# Patient Record
Sex: FEMALE | Race: WHITE | NOT HISPANIC OR LATINO | Employment: OTHER | URBAN - METROPOLITAN AREA
[De-identification: names, ages, dates, MRNs, and addresses within clinical notes are randomized per-mention and may not be internally consistent; named-entity substitution may affect disease eponyms.]

---

## 2018-01-27 ENCOUNTER — APPOINTMENT (OUTPATIENT)
Dept: GENERAL RADIOLOGY | Facility: HOSPITAL | Age: 68
End: 2018-01-27

## 2018-01-27 ENCOUNTER — APPOINTMENT (OUTPATIENT)
Dept: CT IMAGING | Facility: HOSPITAL | Age: 68
End: 2018-01-27

## 2018-01-27 ENCOUNTER — HOSPITAL ENCOUNTER (OUTPATIENT)
Facility: HOSPITAL | Age: 68
Setting detail: OBSERVATION
Discharge: NURSING, REHAB OR LONG-TERM CARE | End: 2018-02-09
Attending: EMERGENCY MEDICINE | Admitting: HOSPITALIST

## 2018-01-27 DIAGNOSIS — S42.351K CLOSED DISPLACED COMMINUTED FRACTURE OF SHAFT OF RIGHT HUMERUS WITH NONUNION, SUBSEQUENT ENCOUNTER: ICD-10-CM

## 2018-01-27 DIAGNOSIS — R53.81 PHYSICAL DECONDITIONING: ICD-10-CM

## 2018-01-27 DIAGNOSIS — Z78.9 IMPAIRED MOBILITY AND ADLS: ICD-10-CM

## 2018-01-27 DIAGNOSIS — Z74.09 IMPAIRED FUNCTIONAL MOBILITY, BALANCE, GAIT, AND ENDURANCE: ICD-10-CM

## 2018-01-27 DIAGNOSIS — Z74.09 IMPAIRED MOBILITY AND ADLS: ICD-10-CM

## 2018-01-27 DIAGNOSIS — M43.6 TORTICOLLIS, ACUTE: Primary | ICD-10-CM

## 2018-01-27 PROBLEM — R25.1 TREMOR: Status: ACTIVE | Noted: 2018-01-27

## 2018-01-27 PROBLEM — E11.9 DIABETES MELLITUS, TYPE II (HCC): Chronic | Status: ACTIVE | Noted: 2018-01-27

## 2018-01-27 PROBLEM — E78.5 HLD (HYPERLIPIDEMIA): Chronic | Status: ACTIVE | Noted: 2018-01-27

## 2018-01-27 PROBLEM — F20.9 SCHIZOPHRENIA (HCC): Chronic | Status: ACTIVE | Noted: 2018-01-27

## 2018-01-27 PROBLEM — I25.10 CAD (CORONARY ARTERY DISEASE): Chronic | Status: ACTIVE | Noted: 2018-01-27

## 2018-01-27 PROBLEM — D64.9 ANEMIA: Status: ACTIVE | Noted: 2018-01-27

## 2018-01-27 PROBLEM — I10 HTN (HYPERTENSION): Chronic | Status: ACTIVE | Noted: 2018-01-27

## 2018-01-27 LAB
ALBUMIN SERPL-MCNC: 4.3 G/DL (ref 3.2–4.8)
ALBUMIN/GLOB SERPL: 1.5 G/DL (ref 1.5–2.5)
ALP SERPL-CCNC: 63 U/L (ref 25–100)
ALT SERPL W P-5'-P-CCNC: 54 U/L (ref 7–40)
ANION GAP SERPL CALCULATED.3IONS-SCNC: 8 MMOL/L (ref 3–11)
AST SERPL-CCNC: 52 U/L (ref 0–33)
BACTERIA UR QL AUTO: ABNORMAL /HPF
BASOPHILS # BLD AUTO: 0.04 10*3/MM3 (ref 0–0.2)
BASOPHILS NFR BLD AUTO: 0.4 % (ref 0–1)
BILIRUB SERPL-MCNC: 0.5 MG/DL (ref 0.3–1.2)
BILIRUB UR QL STRIP: NEGATIVE
BUN BLD-MCNC: 21 MG/DL (ref 9–23)
BUN/CREAT SERPL: 26.3 (ref 7–25)
CALCIUM SPEC-SCNC: 9.5 MG/DL (ref 8.7–10.4)
CHLORIDE SERPL-SCNC: 104 MMOL/L (ref 99–109)
CLARITY UR: CLEAR
CO2 SERPL-SCNC: 30 MMOL/L (ref 20–31)
COLOR UR: YELLOW
CREAT BLD-MCNC: 0.8 MG/DL (ref 0.6–1.3)
D-LACTATE SERPL-SCNC: 1.4 MMOL/L (ref 0.5–2)
DEPRECATED RDW RBC AUTO: 48.6 FL (ref 37–54)
EOSINOPHIL # BLD AUTO: 0.01 10*3/MM3 (ref 0–0.3)
EOSINOPHIL NFR BLD AUTO: 0.1 % (ref 0–3)
ERYTHROCYTE [DISTWIDTH] IN BLOOD BY AUTOMATED COUNT: 14.7 % (ref 11.3–14.5)
FLUAV AG NPH QL: NEGATIVE
FLUBV AG NPH QL IA: NEGATIVE
GFR SERPL CREATININE-BSD FRML MDRD: 71 ML/MIN/1.73
GLOBULIN UR ELPH-MCNC: 2.9 GM/DL
GLUCOSE BLD-MCNC: 113 MG/DL (ref 70–100)
GLUCOSE BLDC GLUCOMTR-MCNC: 116 MG/DL (ref 70–130)
GLUCOSE UR STRIP-MCNC: NEGATIVE MG/DL
HCT VFR BLD AUTO: 28.8 % (ref 34.5–44)
HGB BLD-MCNC: 9.4 G/DL (ref 11.5–15.5)
HGB UR QL STRIP.AUTO: NEGATIVE
HYALINE CASTS UR QL AUTO: ABNORMAL /LPF
IMM GRANULOCYTES # BLD: 0.05 10*3/MM3 (ref 0–0.03)
IMM GRANULOCYTES NFR BLD: 0.5 % (ref 0–0.6)
KETONES UR QL STRIP: NEGATIVE
LEUKOCYTE ESTERASE UR QL STRIP.AUTO: ABNORMAL
LYMPHOCYTES # BLD AUTO: 0.77 10*3/MM3 (ref 0.6–4.8)
LYMPHOCYTES NFR BLD AUTO: 7.4 % (ref 24–44)
MAGNESIUM SERPL-MCNC: 1.3 MG/DL (ref 1.3–2.7)
MCH RBC QN AUTO: 29.5 PG (ref 27–31)
MCHC RBC AUTO-ENTMCNC: 32.6 G/DL (ref 32–36)
MCV RBC AUTO: 90.3 FL (ref 80–99)
MONOCYTES # BLD AUTO: 0.76 10*3/MM3 (ref 0–1)
MONOCYTES NFR BLD AUTO: 7.3 % (ref 0–12)
NEUTROPHILS # BLD AUTO: 8.81 10*3/MM3 (ref 1.5–8.3)
NEUTROPHILS NFR BLD AUTO: 84.3 % (ref 41–71)
NITRITE UR QL STRIP: NEGATIVE
PH UR STRIP.AUTO: 6 [PH] (ref 5–8)
PLATELET # BLD AUTO: 416 10*3/MM3 (ref 150–450)
PMV BLD AUTO: 9.3 FL (ref 6–12)
POTASSIUM BLD-SCNC: 3.7 MMOL/L (ref 3.5–5.5)
PROT SERPL-MCNC: 7.2 G/DL (ref 5.7–8.2)
PROT UR QL STRIP: NEGATIVE
RBC # BLD AUTO: 3.19 10*6/MM3 (ref 3.89–5.14)
RBC # UR: ABNORMAL /HPF
REF LAB TEST METHOD: ABNORMAL
SODIUM BLD-SCNC: 142 MMOL/L (ref 132–146)
SP GR UR STRIP: 1.01 (ref 1–1.03)
SQUAMOUS #/AREA URNS HPF: ABNORMAL /HPF
UROBILINOGEN UR QL STRIP: ABNORMAL
WBC NRBC COR # BLD: 10.44 10*3/MM3 (ref 3.5–10.8)
WBC UR QL AUTO: ABNORMAL /HPF

## 2018-01-27 PROCEDURE — 85025 COMPLETE CBC W/AUTO DIFF WBC: CPT | Performed by: NURSE PRACTITIONER

## 2018-01-27 PROCEDURE — 96374 THER/PROPH/DIAG INJ IV PUSH: CPT

## 2018-01-27 PROCEDURE — 82962 GLUCOSE BLOOD TEST: CPT

## 2018-01-27 PROCEDURE — 93005 ELECTROCARDIOGRAM TRACING: CPT | Performed by: NURSE PRACTITIONER

## 2018-01-27 PROCEDURE — 71045 X-RAY EXAM CHEST 1 VIEW: CPT

## 2018-01-27 PROCEDURE — 25010000002 FENTANYL CITRATE (PF) 100 MCG/2ML SOLUTION: Performed by: EMERGENCY MEDICINE

## 2018-01-27 PROCEDURE — 96372 THER/PROPH/DIAG INJ SC/IM: CPT

## 2018-01-27 PROCEDURE — 81001 URINALYSIS AUTO W/SCOPE: CPT | Performed by: NURSE PRACTITIONER

## 2018-01-27 PROCEDURE — 83605 ASSAY OF LACTIC ACID: CPT | Performed by: NURSE PRACTITIONER

## 2018-01-27 PROCEDURE — 93010 ELECTROCARDIOGRAM REPORT: CPT | Performed by: INTERNAL MEDICINE

## 2018-01-27 PROCEDURE — 25010000002 HEPARIN (PORCINE) PER 1000 UNITS: Performed by: NURSE PRACTITIONER

## 2018-01-27 PROCEDURE — 87040 BLOOD CULTURE FOR BACTERIA: CPT | Performed by: NURSE PRACTITIONER

## 2018-01-27 PROCEDURE — G0378 HOSPITAL OBSERVATION PER HR: HCPCS

## 2018-01-27 PROCEDURE — 96361 HYDRATE IV INFUSION ADD-ON: CPT

## 2018-01-27 PROCEDURE — 99285 EMERGENCY DEPT VISIT HI MDM: CPT

## 2018-01-27 PROCEDURE — 87804 INFLUENZA ASSAY W/OPTIC: CPT | Performed by: NURSE PRACTITIONER

## 2018-01-27 PROCEDURE — 99220 PR INITIAL OBSERVATION CARE/DAY 70 MINUTES: CPT | Performed by: HOSPITALIST

## 2018-01-27 PROCEDURE — 70450 CT HEAD/BRAIN W/O DYE: CPT

## 2018-01-27 PROCEDURE — 36415 COLL VENOUS BLD VENIPUNCTURE: CPT

## 2018-01-27 PROCEDURE — P9612 CATHETERIZE FOR URINE SPEC: HCPCS

## 2018-01-27 PROCEDURE — 83735 ASSAY OF MAGNESIUM: CPT | Performed by: NURSE PRACTITIONER

## 2018-01-27 PROCEDURE — 80053 COMPREHEN METABOLIC PANEL: CPT | Performed by: NURSE PRACTITIONER

## 2018-01-27 RX ORDER — FLUOXETINE 10 MG/1
10 CAPSULE ORAL DAILY
Status: DISCONTINUED | OUTPATIENT
Start: 2018-01-28 | End: 2018-02-09 | Stop reason: HOSPADM

## 2018-01-27 RX ORDER — FENTANYL CITRATE 50 UG/ML
25 INJECTION, SOLUTION INTRAMUSCULAR; INTRAVENOUS ONCE
Status: COMPLETED | OUTPATIENT
Start: 2018-01-27 | End: 2018-01-27

## 2018-01-27 RX ORDER — ONDANSETRON 2 MG/ML
4 INJECTION INTRAMUSCULAR; INTRAVENOUS EVERY 6 HOURS PRN
Status: DISCONTINUED | OUTPATIENT
Start: 2018-01-27 | End: 2018-02-09 | Stop reason: HOSPADM

## 2018-01-27 RX ORDER — BENZTROPINE MESYLATE 1 MG/1
0.5 TABLET ORAL EVERY EVENING
Status: DISCONTINUED | OUTPATIENT
Start: 2018-01-27 | End: 2018-02-09 | Stop reason: HOSPADM

## 2018-01-27 RX ORDER — BUSPIRONE HYDROCHLORIDE 10 MG/1
5 TABLET ORAL 2 TIMES DAILY
Status: DISCONTINUED | OUTPATIENT
Start: 2018-01-27 | End: 2018-02-09 | Stop reason: HOSPADM

## 2018-01-27 RX ORDER — GABAPENTIN 400 MG/1
400 CAPSULE ORAL 2 TIMES DAILY
COMMUNITY
End: 2018-02-09 | Stop reason: HOSPADM

## 2018-01-27 RX ORDER — TRAMADOL HYDROCHLORIDE 50 MG/1
50 TABLET ORAL 2 TIMES DAILY PRN
Status: DISCONTINUED | OUTPATIENT
Start: 2018-01-27 | End: 2018-02-09 | Stop reason: HOSPADM

## 2018-01-27 RX ORDER — AMLODIPINE BESYLATE 10 MG/1
10 TABLET ORAL DAILY
COMMUNITY

## 2018-01-27 RX ORDER — FLUTICASONE PROPIONATE 50 MCG
2 SPRAY, SUSPENSION (ML) NASAL DAILY
Status: DISCONTINUED | OUTPATIENT
Start: 2018-01-28 | End: 2018-02-09 | Stop reason: HOSPADM

## 2018-01-27 RX ORDER — PERPHENAZINE 4 MG/1
4 TABLET ORAL DAILY
COMMUNITY

## 2018-01-27 RX ORDER — PANTOPRAZOLE SODIUM 40 MG/1
40 TABLET, DELAYED RELEASE ORAL
Status: DISCONTINUED | OUTPATIENT
Start: 2018-01-28 | End: 2018-02-09 | Stop reason: HOSPADM

## 2018-01-27 RX ORDER — ATORVASTATIN CALCIUM 40 MG/1
40 TABLET, FILM COATED ORAL DAILY
COMMUNITY

## 2018-01-27 RX ORDER — DEXTROSE MONOHYDRATE 25 G/50ML
25 INJECTION, SOLUTION INTRAVENOUS
Status: DISCONTINUED | OUTPATIENT
Start: 2018-01-27 | End: 2018-02-05

## 2018-01-27 RX ORDER — ONDANSETRON 4 MG/1
4 TABLET, FILM COATED ORAL EVERY 6 HOURS PRN
Status: DISCONTINUED | OUTPATIENT
Start: 2018-01-27 | End: 2018-02-09 | Stop reason: HOSPADM

## 2018-01-27 RX ORDER — AMLODIPINE BESYLATE 10 MG/1
10 TABLET ORAL DAILY
Status: DISCONTINUED | OUTPATIENT
Start: 2018-01-28 | End: 2018-02-09 | Stop reason: HOSPADM

## 2018-01-27 RX ORDER — SODIUM CHLORIDE 0.9 % (FLUSH) 0.9 %
10 SYRINGE (ML) INJECTION AS NEEDED
Status: DISCONTINUED | OUTPATIENT
Start: 2018-01-27 | End: 2018-02-09 | Stop reason: HOSPADM

## 2018-01-27 RX ORDER — OLANZAPINE 5 MG/1
10 TABLET ORAL NIGHTLY
Status: DISCONTINUED | OUTPATIENT
Start: 2018-01-27 | End: 2018-02-09 | Stop reason: HOSPADM

## 2018-01-27 RX ORDER — BENZTROPINE MESYLATE 0.5 MG/1
0.5 TABLET ORAL EVERY EVENING
COMMUNITY

## 2018-01-27 RX ORDER — TRAMADOL HYDROCHLORIDE 50 MG/1
50 TABLET ORAL 2 TIMES DAILY
COMMUNITY
End: 2018-02-09 | Stop reason: HOSPADM

## 2018-01-27 RX ORDER — POLYETHYLENE GLYCOL 3350 17 G/17G
17 POWDER, FOR SOLUTION ORAL DAILY PRN
COMMUNITY

## 2018-01-27 RX ORDER — FLUTICASONE PROPIONATE 50 MCG
2 SPRAY, SUSPENSION (ML) NASAL DAILY
COMMUNITY

## 2018-01-27 RX ORDER — NICOTINE POLACRILEX 4 MG
15 LOZENGE BUCCAL
Status: DISCONTINUED | OUTPATIENT
Start: 2018-01-27 | End: 2018-02-05

## 2018-01-27 RX ORDER — HYDROCHLOROTHIAZIDE 12.5 MG/1
12.5 CAPSULE, GELATIN COATED ORAL DAILY
COMMUNITY
End: 2018-02-09 | Stop reason: HOSPADM

## 2018-01-27 RX ORDER — CLONAZEPAM 0.5 MG/1
0.5 TABLET ORAL 2 TIMES DAILY
COMMUNITY
End: 2018-02-09 | Stop reason: HOSPADM

## 2018-01-27 RX ORDER — ATORVASTATIN CALCIUM 40 MG/1
40 TABLET, FILM COATED ORAL NIGHTLY
Status: DISCONTINUED | OUTPATIENT
Start: 2018-01-27 | End: 2018-02-09 | Stop reason: HOSPADM

## 2018-01-27 RX ORDER — LOPERAMIDE HYDROCHLORIDE 2 MG/1
2 CAPSULE ORAL 4 TIMES DAILY PRN
COMMUNITY
End: 2018-02-09 | Stop reason: HOSPADM

## 2018-01-27 RX ORDER — CLOPIDOGREL BISULFATE 75 MG/1
75 TABLET ORAL DAILY
Status: DISCONTINUED | OUTPATIENT
Start: 2018-01-28 | End: 2018-02-09 | Stop reason: HOSPADM

## 2018-01-27 RX ORDER — SODIUM CHLORIDE 0.9 % (FLUSH) 0.9 %
1-10 SYRINGE (ML) INJECTION AS NEEDED
Status: DISCONTINUED | OUTPATIENT
Start: 2018-01-27 | End: 2018-02-09 | Stop reason: HOSPADM

## 2018-01-27 RX ORDER — ACETAMINOPHEN 325 MG/1
650 TABLET ORAL EVERY 4 HOURS PRN
Status: DISCONTINUED | OUTPATIENT
Start: 2018-01-27 | End: 2018-02-09 | Stop reason: HOSPADM

## 2018-01-27 RX ORDER — OMEPRAZOLE 20 MG/1
20 CAPSULE, DELAYED RELEASE ORAL DAILY
COMMUNITY

## 2018-01-27 RX ORDER — BUSPIRONE HYDROCHLORIDE 5 MG/1
5 TABLET ORAL 2 TIMES DAILY
COMMUNITY

## 2018-01-27 RX ORDER — PERPHENAZINE 2 MG/1
4 TABLET ORAL DAILY
Status: DISCONTINUED | OUTPATIENT
Start: 2018-01-28 | End: 2018-02-09 | Stop reason: HOSPADM

## 2018-01-27 RX ORDER — CLONAZEPAM 0.5 MG/1
0.5 TABLET ORAL 2 TIMES DAILY PRN
Status: DISCONTINUED | OUTPATIENT
Start: 2018-01-27 | End: 2018-02-09 | Stop reason: HOSPADM

## 2018-01-27 RX ORDER — TRAZODONE HYDROCHLORIDE 50 MG/1
50 TABLET ORAL NIGHTLY
COMMUNITY

## 2018-01-27 RX ORDER — ASPIRIN 81 MG/1
81 TABLET ORAL DAILY
COMMUNITY

## 2018-01-27 RX ORDER — FLUOXETINE 10 MG/1
10 CAPSULE ORAL DAILY
COMMUNITY

## 2018-01-27 RX ORDER — BISACODYL 5 MG/1
5 TABLET, DELAYED RELEASE ORAL DAILY PRN
Status: DISCONTINUED | OUTPATIENT
Start: 2018-01-27 | End: 2018-02-09 | Stop reason: HOSPADM

## 2018-01-27 RX ORDER — ASPIRIN 81 MG/1
81 TABLET ORAL DAILY
Status: DISCONTINUED | OUTPATIENT
Start: 2018-01-28 | End: 2018-02-09 | Stop reason: HOSPADM

## 2018-01-27 RX ORDER — MAGNESIUM HYDROXIDE/ALUMINUM HYDROXICE/SIMETHICONE 120; 1200; 1200 MG/30ML; MG/30ML; MG/30ML
SUSPENSION ORAL AS NEEDED
Status: ON HOLD | COMMUNITY
End: 2018-02-09

## 2018-01-27 RX ORDER — HYDROCODONE BITARTRATE AND ACETAMINOPHEN 7.5; 325 MG/1; MG/1
1 TABLET ORAL EVERY 4 HOURS PRN
COMMUNITY
End: 2018-02-09 | Stop reason: HOSPADM

## 2018-01-27 RX ORDER — CLOPIDOGREL BISULFATE 75 MG/1
75 TABLET ORAL DAILY
COMMUNITY

## 2018-01-27 RX ORDER — LORATADINE 10 MG/1
10 TABLET ORAL DAILY
COMMUNITY

## 2018-01-27 RX ORDER — HYDROCODONE BITARTRATE AND ACETAMINOPHEN 7.5; 325 MG/1; MG/1
1 TABLET ORAL ONCE
Status: COMPLETED | OUTPATIENT
Start: 2018-01-27 | End: 2018-01-27

## 2018-01-27 RX ORDER — OLANZAPINE 10 MG/1
10 TABLET ORAL NIGHTLY
COMMUNITY

## 2018-01-27 RX ORDER — TRAZODONE HYDROCHLORIDE 50 MG/1
50 TABLET ORAL NIGHTLY
Status: DISCONTINUED | OUTPATIENT
Start: 2018-01-27 | End: 2018-02-09 | Stop reason: HOSPADM

## 2018-01-27 RX ORDER — HYDROXYZINE HYDROCHLORIDE 25 MG/1
25 TABLET, FILM COATED ORAL 2 TIMES DAILY PRN
COMMUNITY
End: 2018-02-09 | Stop reason: HOSPADM

## 2018-01-27 RX ORDER — HYDROCODONE BITARTRATE AND ACETAMINOPHEN 7.5; 325 MG/1; MG/1
1 TABLET ORAL EVERY 4 HOURS PRN
Status: DISCONTINUED | OUTPATIENT
Start: 2018-01-27 | End: 2018-02-09 | Stop reason: HOSPADM

## 2018-01-27 RX ORDER — FAMOTIDINE 20 MG/1
20 TABLET, FILM COATED ORAL 2 TIMES DAILY PRN
Status: DISCONTINUED | OUTPATIENT
Start: 2018-01-27 | End: 2018-02-09 | Stop reason: HOSPADM

## 2018-01-27 RX ORDER — IBUPROFEN 600 MG/1
600 TABLET ORAL EVERY 6 HOURS PRN
COMMUNITY

## 2018-01-27 RX ORDER — DOCUSATE SODIUM 100 MG/1
100 CAPSULE, LIQUID FILLED ORAL 2 TIMES DAILY PRN
Status: DISCONTINUED | OUTPATIENT
Start: 2018-01-27 | End: 2018-02-09 | Stop reason: HOSPADM

## 2018-01-27 RX ORDER — ACETAMINOPHEN 325 MG/1
650 TABLET ORAL EVERY 6 HOURS PRN
COMMUNITY

## 2018-01-27 RX ORDER — GABAPENTIN 400 MG/1
400 CAPSULE ORAL 2 TIMES DAILY
Status: DISCONTINUED | OUTPATIENT
Start: 2018-01-27 | End: 2018-02-09 | Stop reason: HOSPADM

## 2018-01-27 RX ORDER — HEPARIN SODIUM 5000 [USP'U]/ML
5000 INJECTION, SOLUTION INTRAVENOUS; SUBCUTANEOUS EVERY 12 HOURS SCHEDULED
Status: DISCONTINUED | OUTPATIENT
Start: 2018-01-27 | End: 2018-02-09 | Stop reason: HOSPADM

## 2018-01-27 RX ORDER — CETIRIZINE HYDROCHLORIDE 10 MG/1
10 TABLET ORAL NIGHTLY
Status: DISCONTINUED | OUTPATIENT
Start: 2018-01-27 | End: 2018-02-09 | Stop reason: HOSPADM

## 2018-01-27 RX ADMIN — HYDROCODONE BITARTRATE AND ACETAMINOPHEN 1 TABLET: 7.5; 325 TABLET ORAL at 22:07

## 2018-01-27 RX ADMIN — CETIRIZINE HYDROCHLORIDE 10 MG: 10 TABLET, FILM COATED ORAL at 22:07

## 2018-01-27 RX ADMIN — HYDROCODONE BITARTRATE AND ACETAMINOPHEN 1 TABLET: 7.5; 325 TABLET ORAL at 17:52

## 2018-01-27 RX ADMIN — TRAZODONE HYDROCHLORIDE 50 MG: 50 TABLET ORAL at 22:07

## 2018-01-27 RX ADMIN — ATORVASTATIN CALCIUM 40 MG: 40 TABLET, FILM COATED ORAL at 22:07

## 2018-01-27 RX ADMIN — BUSPIRONE HYDROCHLORIDE 5 MG: 10 TABLET ORAL at 22:07

## 2018-01-27 RX ADMIN — FENTANYL CITRATE 25 MCG: 50 INJECTION, SOLUTION INTRAMUSCULAR; INTRAVENOUS at 15:21

## 2018-01-27 RX ADMIN — HEPARIN SODIUM 5000 UNITS: 5000 INJECTION, SOLUTION INTRAVENOUS; SUBCUTANEOUS at 22:07

## 2018-01-27 RX ADMIN — SODIUM CHLORIDE 1000 ML: 9 INJECTION, SOLUTION INTRAVENOUS at 13:03

## 2018-01-27 RX ADMIN — OLANZAPINE 10 MG: 5 TABLET, FILM COATED ORAL at 22:42

## 2018-01-27 RX ADMIN — BENZTROPINE MESYLATE 0.5 MG: 1 TABLET ORAL at 22:42

## 2018-01-27 RX ADMIN — GABAPENTIN 400 MG: 400 CAPSULE ORAL at 22:07

## 2018-01-28 ENCOUNTER — APPOINTMENT (OUTPATIENT)
Dept: GENERAL RADIOLOGY | Facility: HOSPITAL | Age: 68
End: 2018-01-28

## 2018-01-28 LAB
AMPHET+METHAMPHET UR QL: NEGATIVE
AMPHETAMINES UR QL: NEGATIVE
ANION GAP SERPL CALCULATED.3IONS-SCNC: 10 MMOL/L (ref 3–11)
BARBITURATES UR QL SCN: NEGATIVE
BENZODIAZ UR QL SCN: NEGATIVE
BUN BLD-MCNC: 17 MG/DL (ref 9–23)
BUN/CREAT SERPL: 24.3 (ref 7–25)
BUPRENORPHINE SERPL-MCNC: NEGATIVE NG/ML
CALCIUM SPEC-SCNC: 8.8 MG/DL (ref 8.7–10.4)
CANNABINOIDS SERPL QL: NEGATIVE
CHLORIDE SERPL-SCNC: 101 MMOL/L (ref 99–109)
CO2 SERPL-SCNC: 30 MMOL/L (ref 20–31)
COCAINE UR QL: NEGATIVE
CREAT BLD-MCNC: 0.7 MG/DL (ref 0.6–1.3)
DEPRECATED RDW RBC AUTO: 50.2 FL (ref 37–54)
ERYTHROCYTE [DISTWIDTH] IN BLOOD BY AUTOMATED COUNT: 14.9 % (ref 11.3–14.5)
FERRITIN SERPL-MCNC: 42 NG/ML (ref 10–291)
FLUAV SUBTYP SPEC NAA+PROBE: NOT DETECTED
FLUBV RNA ISLT QL NAA+PROBE: NOT DETECTED
FOLATE SERPL-MCNC: 14.79 NG/ML (ref 3.2–20)
GFR SERPL CREATININE-BSD FRML MDRD: 83 ML/MIN/1.73
GLUCOSE BLD-MCNC: 112 MG/DL (ref 70–100)
GLUCOSE BLDC GLUCOMTR-MCNC: 111 MG/DL (ref 70–130)
GLUCOSE BLDC GLUCOMTR-MCNC: 117 MG/DL (ref 70–130)
GLUCOSE BLDC GLUCOMTR-MCNC: 118 MG/DL (ref 70–130)
GLUCOSE BLDC GLUCOMTR-MCNC: 141 MG/DL (ref 70–130)
HCT VFR BLD AUTO: 28 % (ref 34.5–44)
HGB BLD-MCNC: 8.7 G/DL (ref 11.5–15.5)
IRON 24H UR-MRATE: 29 MCG/DL (ref 50–175)
IRON SATN MFR SERPL: 10 % (ref 15–50)
MCH RBC QN AUTO: 28.5 PG (ref 27–31)
MCHC RBC AUTO-ENTMCNC: 31.1 G/DL (ref 32–36)
MCV RBC AUTO: 91.8 FL (ref 80–99)
METHADONE UR QL SCN: NEGATIVE
OPIATES UR QL: POSITIVE
OXYCODONE UR QL SCN: NEGATIVE
PCP UR QL SCN: NEGATIVE
PLATELET # BLD AUTO: 382 10*3/MM3 (ref 150–450)
PMV BLD AUTO: 9.6 FL (ref 6–12)
POTASSIUM BLD-SCNC: 3.7 MMOL/L (ref 3.5–5.5)
PROPOXYPH UR QL: NEGATIVE
RBC # BLD AUTO: 3.05 10*6/MM3 (ref 3.89–5.14)
RETICS/RBC NFR AUTO: 2.51 % (ref 0.5–1.5)
SODIUM BLD-SCNC: 141 MMOL/L (ref 132–146)
TIBC SERPL-MCNC: 301 MCG/DL (ref 250–450)
TRICYCLICS UR QL SCN: NEGATIVE
VIT B12 BLD-MCNC: 291 PG/ML (ref 211–911)
WBC NRBC COR # BLD: 7.47 10*3/MM3 (ref 3.5–10.8)

## 2018-01-28 PROCEDURE — 63710000001 INSULIN LISPRO (HUMAN) PER 5 UNITS: Performed by: NURSE PRACTITIONER

## 2018-01-28 PROCEDURE — 85027 COMPLETE CBC AUTOMATED: CPT | Performed by: NURSE PRACTITIONER

## 2018-01-28 PROCEDURE — 80048 BASIC METABOLIC PNL TOTAL CA: CPT | Performed by: NURSE PRACTITIONER

## 2018-01-28 PROCEDURE — 99226 PR SBSQ OBSERVATION CARE/DAY 35 MINUTES: CPT | Performed by: INTERNAL MEDICINE

## 2018-01-28 PROCEDURE — G0378 HOSPITAL OBSERVATION PER HR: HCPCS

## 2018-01-28 PROCEDURE — 97162 PT EVAL MOD COMPLEX 30 MIN: CPT

## 2018-01-28 PROCEDURE — 83550 IRON BINDING TEST: CPT | Performed by: NURSE PRACTITIONER

## 2018-01-28 PROCEDURE — 97116 GAIT TRAINING THERAPY: CPT

## 2018-01-28 PROCEDURE — 73060 X-RAY EXAM OF HUMERUS: CPT

## 2018-01-28 PROCEDURE — 82607 VITAMIN B-12: CPT | Performed by: NURSE PRACTITIONER

## 2018-01-28 PROCEDURE — 82962 GLUCOSE BLOOD TEST: CPT

## 2018-01-28 PROCEDURE — 82746 ASSAY OF FOLIC ACID SERUM: CPT | Performed by: NURSE PRACTITIONER

## 2018-01-28 PROCEDURE — 99204 OFFICE O/P NEW MOD 45 MIN: CPT | Performed by: ORTHOPAEDIC SURGERY

## 2018-01-28 PROCEDURE — 87502 INFLUENZA DNA AMP PROBE: CPT | Performed by: NURSE PRACTITIONER

## 2018-01-28 PROCEDURE — 94799 UNLISTED PULMONARY SVC/PX: CPT

## 2018-01-28 PROCEDURE — 25010000002 HEPARIN (PORCINE) PER 1000 UNITS: Performed by: NURSE PRACTITIONER

## 2018-01-28 PROCEDURE — 85045 AUTOMATED RETICULOCYTE COUNT: CPT | Performed by: NURSE PRACTITIONER

## 2018-01-28 PROCEDURE — 96372 THER/PROPH/DIAG INJ SC/IM: CPT

## 2018-01-28 PROCEDURE — 80306 DRUG TEST PRSMV INSTRMNT: CPT | Performed by: NURSE PRACTITIONER

## 2018-01-28 PROCEDURE — 83540 ASSAY OF IRON: CPT | Performed by: NURSE PRACTITIONER

## 2018-01-28 PROCEDURE — 82728 ASSAY OF FERRITIN: CPT | Performed by: NURSE PRACTITIONER

## 2018-01-28 RX ORDER — ASCORBIC ACID 500 MG
500 TABLET ORAL DAILY
Status: DISCONTINUED | OUTPATIENT
Start: 2018-01-28 | End: 2018-02-09 | Stop reason: HOSPADM

## 2018-01-28 RX ORDER — FERROUS SULFATE 325(65) MG
325 TABLET ORAL
Status: DISCONTINUED | OUTPATIENT
Start: 2018-01-28 | End: 2018-02-09 | Stop reason: HOSPADM

## 2018-01-28 RX ADMIN — HYDROCODONE BITARTRATE AND ACETAMINOPHEN 1 TABLET: 7.5; 325 TABLET ORAL at 20:15

## 2018-01-28 RX ADMIN — ASPIRIN 81 MG: 81 TABLET, COATED ORAL at 08:50

## 2018-01-28 RX ADMIN — CLONAZEPAM 0.5 MG: 0.5 TABLET ORAL at 20:15

## 2018-01-28 RX ADMIN — OLANZAPINE 10 MG: 5 TABLET, FILM COATED ORAL at 20:15

## 2018-01-28 RX ADMIN — TRAZODONE HYDROCHLORIDE 50 MG: 50 TABLET ORAL at 20:15

## 2018-01-28 RX ADMIN — BUSPIRONE HYDROCHLORIDE 5 MG: 10 TABLET ORAL at 20:15

## 2018-01-28 RX ADMIN — PERPHENAZINE 4 MG: 2 TABLET, FILM COATED ORAL at 08:50

## 2018-01-28 RX ADMIN — FLUOXETINE 10 MG: 10 CAPSULE ORAL at 08:50

## 2018-01-28 RX ADMIN — FLUTICASONE PROPIONATE 2 SPRAY: 50 SPRAY, METERED NASAL at 08:49

## 2018-01-28 RX ADMIN — BUSPIRONE HYDROCHLORIDE 5 MG: 10 TABLET ORAL at 08:50

## 2018-01-28 RX ADMIN — OXYCODONE HYDROCHLORIDE AND ACETAMINOPHEN 500 MG: 500 TABLET ORAL at 16:09

## 2018-01-28 RX ADMIN — CLOPIDOGREL BISULFATE 75 MG: 75 TABLET ORAL at 08:50

## 2018-01-28 RX ADMIN — CETIRIZINE HYDROCHLORIDE 10 MG: 10 TABLET, FILM COATED ORAL at 20:15

## 2018-01-28 RX ADMIN — BENZTROPINE MESYLATE 0.5 MG: 1 TABLET ORAL at 16:14

## 2018-01-28 RX ADMIN — GABAPENTIN 400 MG: 400 CAPSULE ORAL at 20:15

## 2018-01-28 RX ADMIN — ATORVASTATIN CALCIUM 40 MG: 40 TABLET, FILM COATED ORAL at 20:15

## 2018-01-28 RX ADMIN — HYDROCODONE BITARTRATE AND ACETAMINOPHEN 1 TABLET: 7.5; 325 TABLET ORAL at 16:09

## 2018-01-28 RX ADMIN — HEPARIN SODIUM 5000 UNITS: 5000 INJECTION, SOLUTION INTRAVENOUS; SUBCUTANEOUS at 20:14

## 2018-01-28 RX ADMIN — HYDROCODONE BITARTRATE AND ACETAMINOPHEN 1 TABLET: 7.5; 325 TABLET ORAL at 05:32

## 2018-01-28 RX ADMIN — Medication 325 MG: at 16:09

## 2018-01-28 RX ADMIN — TRAMADOL HYDROCHLORIDE 50 MG: 50 TABLET, COATED ORAL at 20:15

## 2018-01-28 RX ADMIN — PANTOPRAZOLE SODIUM 40 MG: 40 TABLET, DELAYED RELEASE ORAL at 05:31

## 2018-01-28 RX ADMIN — AMLODIPINE BESYLATE 10 MG: 10 TABLET ORAL at 08:50

## 2018-01-28 RX ADMIN — GABAPENTIN 400 MG: 400 CAPSULE ORAL at 08:50

## 2018-01-28 RX ADMIN — HYDROCODONE BITARTRATE AND ACETAMINOPHEN 1 TABLET: 7.5; 325 TABLET ORAL at 11:06

## 2018-01-28 RX ADMIN — HEPARIN SODIUM 5000 UNITS: 5000 INJECTION, SOLUTION INTRAVENOUS; SUBCUTANEOUS at 08:53

## 2018-01-29 ENCOUNTER — APPOINTMENT (OUTPATIENT)
Dept: GENERAL RADIOLOGY | Facility: HOSPITAL | Age: 68
End: 2018-01-29

## 2018-01-29 LAB
GLUCOSE BLDC GLUCOMTR-MCNC: 121 MG/DL (ref 70–130)
GLUCOSE BLDC GLUCOMTR-MCNC: 177 MG/DL (ref 70–130)
GLUCOSE BLDC GLUCOMTR-MCNC: 199 MG/DL (ref 70–130)
GLUCOSE BLDC GLUCOMTR-MCNC: 200 MG/DL (ref 70–130)

## 2018-01-29 PROCEDURE — 97116 GAIT TRAINING THERAPY: CPT

## 2018-01-29 PROCEDURE — G8987 SELF CARE CURRENT STATUS: HCPCS | Performed by: OCCUPATIONAL THERAPIST

## 2018-01-29 PROCEDURE — 73020 X-RAY EXAM OF SHOULDER: CPT

## 2018-01-29 PROCEDURE — G8988 SELF CARE GOAL STATUS: HCPCS | Performed by: OCCUPATIONAL THERAPIST

## 2018-01-29 PROCEDURE — G0378 HOSPITAL OBSERVATION PER HR: HCPCS

## 2018-01-29 PROCEDURE — 25010000002 HEPARIN (PORCINE) PER 1000 UNITS: Performed by: NURSE PRACTITIONER

## 2018-01-29 PROCEDURE — 97166 OT EVAL MOD COMPLEX 45 MIN: CPT | Performed by: OCCUPATIONAL THERAPIST

## 2018-01-29 PROCEDURE — 97530 THERAPEUTIC ACTIVITIES: CPT | Performed by: OCCUPATIONAL THERAPIST

## 2018-01-29 PROCEDURE — 99213 OFFICE O/P EST LOW 20 MIN: CPT | Performed by: ORTHOPAEDIC SURGERY

## 2018-01-29 PROCEDURE — 96372 THER/PROPH/DIAG INJ SC/IM: CPT

## 2018-01-29 PROCEDURE — 99225 PR SBSQ OBSERVATION CARE/DAY 25 MINUTES: CPT | Performed by: INTERNAL MEDICINE

## 2018-01-29 PROCEDURE — 82962 GLUCOSE BLOOD TEST: CPT

## 2018-01-29 RX ORDER — LIDOCAINE 50 MG/G
1 PATCH TOPICAL
Status: DISCONTINUED | OUTPATIENT
Start: 2018-01-29 | End: 2018-02-09 | Stop reason: HOSPADM

## 2018-01-29 RX ADMIN — INSULIN LISPRO 3 UNITS: 100 INJECTION, SOLUTION INTRAVENOUS; SUBCUTANEOUS at 17:57

## 2018-01-29 RX ADMIN — CLOPIDOGREL BISULFATE 75 MG: 75 TABLET ORAL at 09:28

## 2018-01-29 RX ADMIN — PERPHENAZINE 4 MG: 2 TABLET, FILM COATED ORAL at 09:28

## 2018-01-29 RX ADMIN — BUSPIRONE HYDROCHLORIDE 5 MG: 10 TABLET ORAL at 09:28

## 2018-01-29 RX ADMIN — INSULIN LISPRO 2 UNITS: 100 INJECTION, SOLUTION INTRAVENOUS; SUBCUTANEOUS at 11:42

## 2018-01-29 RX ADMIN — PANTOPRAZOLE SODIUM 40 MG: 40 TABLET, DELAYED RELEASE ORAL at 09:31

## 2018-01-29 RX ADMIN — ACETAMINOPHEN 650 MG: 325 TABLET, FILM COATED ORAL at 04:07

## 2018-01-29 RX ADMIN — BENZTROPINE MESYLATE 0.5 MG: 1 TABLET ORAL at 17:57

## 2018-01-29 RX ADMIN — LIDOCAINE 1 PATCH: 50 PATCH CUTANEOUS at 00:50

## 2018-01-29 RX ADMIN — INSULIN LISPRO 2 UNITS: 100 INJECTION, SOLUTION INTRAVENOUS; SUBCUTANEOUS at 21:30

## 2018-01-29 RX ADMIN — AMLODIPINE BESYLATE 10 MG: 10 TABLET ORAL at 09:28

## 2018-01-29 RX ADMIN — CLONAZEPAM 0.5 MG: 0.5 TABLET ORAL at 20:07

## 2018-01-29 RX ADMIN — FLUTICASONE PROPIONATE 2 SPRAY: 50 SPRAY, METERED NASAL at 09:32

## 2018-01-29 RX ADMIN — TRAZODONE HYDROCHLORIDE 50 MG: 50 TABLET ORAL at 20:03

## 2018-01-29 RX ADMIN — ACETAMINOPHEN 650 MG: 325 TABLET, FILM COATED ORAL at 00:02

## 2018-01-29 RX ADMIN — FLUOXETINE 10 MG: 10 CAPSULE ORAL at 09:28

## 2018-01-29 RX ADMIN — OLANZAPINE 10 MG: 5 TABLET, FILM COATED ORAL at 20:02

## 2018-01-29 RX ADMIN — HYDROCODONE BITARTRATE AND ACETAMINOPHEN 1 TABLET: 7.5; 325 TABLET ORAL at 04:07

## 2018-01-29 RX ADMIN — GABAPENTIN 400 MG: 400 CAPSULE ORAL at 09:28

## 2018-01-29 RX ADMIN — LIDOCAINE 1 PATCH: 50 PATCH CUTANEOUS at 09:27

## 2018-01-29 RX ADMIN — BUSPIRONE HYDROCHLORIDE 5 MG: 10 TABLET ORAL at 20:03

## 2018-01-29 RX ADMIN — ASPIRIN 81 MG: 81 TABLET, COATED ORAL at 09:28

## 2018-01-29 RX ADMIN — Medication 325 MG: at 09:28

## 2018-01-29 RX ADMIN — GABAPENTIN 400 MG: 400 CAPSULE ORAL at 20:03

## 2018-01-29 RX ADMIN — HYDROCODONE BITARTRATE AND ACETAMINOPHEN 1 TABLET: 7.5; 325 TABLET ORAL at 00:01

## 2018-01-29 RX ADMIN — HEPARIN SODIUM 5000 UNITS: 5000 INJECTION, SOLUTION INTRAVENOUS; SUBCUTANEOUS at 09:32

## 2018-01-29 RX ADMIN — ATORVASTATIN CALCIUM 40 MG: 40 TABLET, FILM COATED ORAL at 20:03

## 2018-01-29 RX ADMIN — CETIRIZINE HYDROCHLORIDE 10 MG: 10 TABLET, FILM COATED ORAL at 20:02

## 2018-01-29 RX ADMIN — HYDROCODONE BITARTRATE AND ACETAMINOPHEN 1 TABLET: 7.5; 325 TABLET ORAL at 09:29

## 2018-01-29 RX ADMIN — HEPARIN SODIUM 5000 UNITS: 5000 INJECTION, SOLUTION INTRAVENOUS; SUBCUTANEOUS at 20:03

## 2018-01-30 LAB
GLUCOSE BLDC GLUCOMTR-MCNC: 118 MG/DL (ref 70–130)
GLUCOSE BLDC GLUCOMTR-MCNC: 132 MG/DL (ref 70–130)
GLUCOSE BLDC GLUCOMTR-MCNC: 141 MG/DL (ref 70–130)
GLUCOSE BLDC GLUCOMTR-MCNC: 166 MG/DL (ref 70–130)

## 2018-01-30 PROCEDURE — 25010000002 HEPARIN (PORCINE) PER 1000 UNITS: Performed by: NURSE PRACTITIONER

## 2018-01-30 PROCEDURE — 99232 SBSQ HOSP IP/OBS MODERATE 35: CPT | Performed by: INTERNAL MEDICINE

## 2018-01-30 PROCEDURE — G8979 MOBILITY GOAL STATUS: HCPCS

## 2018-01-30 PROCEDURE — G8978 MOBILITY CURRENT STATUS: HCPCS

## 2018-01-30 PROCEDURE — 96372 THER/PROPH/DIAG INJ SC/IM: CPT

## 2018-01-30 PROCEDURE — 97110 THERAPEUTIC EXERCISES: CPT

## 2018-01-30 PROCEDURE — G0378 HOSPITAL OBSERVATION PER HR: HCPCS

## 2018-01-30 PROCEDURE — 99212 OFFICE O/P EST SF 10 MIN: CPT | Performed by: ORTHOPAEDIC SURGERY

## 2018-01-30 PROCEDURE — 82962 GLUCOSE BLOOD TEST: CPT

## 2018-01-30 RX ADMIN — HYDROCODONE BITARTRATE AND ACETAMINOPHEN 1 TABLET: 7.5; 325 TABLET ORAL at 16:52

## 2018-01-30 RX ADMIN — CETIRIZINE HYDROCHLORIDE 10 MG: 10 TABLET, FILM COATED ORAL at 20:56

## 2018-01-30 RX ADMIN — HYDROCODONE BITARTRATE AND ACETAMINOPHEN 1 TABLET: 7.5; 325 TABLET ORAL at 22:04

## 2018-01-30 RX ADMIN — Medication 325 MG: at 08:27

## 2018-01-30 RX ADMIN — PERPHENAZINE 4 MG: 2 TABLET, FILM COATED ORAL at 08:27

## 2018-01-30 RX ADMIN — HYDROCODONE BITARTRATE AND ACETAMINOPHEN 1 TABLET: 7.5; 325 TABLET ORAL at 06:27

## 2018-01-30 RX ADMIN — AMLODIPINE BESYLATE 10 MG: 10 TABLET ORAL at 08:28

## 2018-01-30 RX ADMIN — FLUOXETINE 10 MG: 10 CAPSULE ORAL at 08:28

## 2018-01-30 RX ADMIN — HYDROCODONE BITARTRATE AND ACETAMINOPHEN 1 TABLET: 7.5; 325 TABLET ORAL at 00:19

## 2018-01-30 RX ADMIN — HEPARIN SODIUM 5000 UNITS: 5000 INJECTION, SOLUTION INTRAVENOUS; SUBCUTANEOUS at 08:27

## 2018-01-30 RX ADMIN — GABAPENTIN 400 MG: 400 CAPSULE ORAL at 08:27

## 2018-01-30 RX ADMIN — GABAPENTIN 400 MG: 400 CAPSULE ORAL at 20:57

## 2018-01-30 RX ADMIN — TRAZODONE HYDROCHLORIDE 50 MG: 50 TABLET ORAL at 20:57

## 2018-01-30 RX ADMIN — CLONAZEPAM 0.5 MG: 0.5 TABLET ORAL at 16:52

## 2018-01-30 RX ADMIN — ATORVASTATIN CALCIUM 40 MG: 40 TABLET, FILM COATED ORAL at 20:57

## 2018-01-30 RX ADMIN — HYDROCODONE BITARTRATE AND ACETAMINOPHEN 1 TABLET: 7.5; 325 TABLET ORAL at 12:29

## 2018-01-30 RX ADMIN — LIDOCAINE 1 PATCH: 50 PATCH CUTANEOUS at 08:28

## 2018-01-30 RX ADMIN — CLOPIDOGREL BISULFATE 75 MG: 75 TABLET ORAL at 08:27

## 2018-01-30 RX ADMIN — BUSPIRONE HYDROCHLORIDE 5 MG: 10 TABLET ORAL at 20:57

## 2018-01-30 RX ADMIN — OLANZAPINE 10 MG: 5 TABLET, FILM COATED ORAL at 20:57

## 2018-01-30 RX ADMIN — INSULIN LISPRO 2 UNITS: 100 INJECTION, SOLUTION INTRAVENOUS; SUBCUTANEOUS at 11:45

## 2018-01-30 RX ADMIN — PANTOPRAZOLE SODIUM 40 MG: 40 TABLET, DELAYED RELEASE ORAL at 06:25

## 2018-01-30 RX ADMIN — ASPIRIN 81 MG: 81 TABLET, COATED ORAL at 08:27

## 2018-01-30 RX ADMIN — BUSPIRONE HYDROCHLORIDE 5 MG: 10 TABLET ORAL at 08:27

## 2018-01-30 RX ADMIN — BENZTROPINE MESYLATE 0.5 MG: 1 TABLET ORAL at 16:53

## 2018-01-30 RX ADMIN — HEPARIN SODIUM 5000 UNITS: 5000 INJECTION, SOLUTION INTRAVENOUS; SUBCUTANEOUS at 20:57

## 2018-01-30 RX ADMIN — FLUTICASONE PROPIONATE 2 SPRAY: 50 SPRAY, METERED NASAL at 08:26

## 2018-01-31 LAB
GLUCOSE BLDC GLUCOMTR-MCNC: 105 MG/DL (ref 70–130)
GLUCOSE BLDC GLUCOMTR-MCNC: 106 MG/DL (ref 70–130)
GLUCOSE BLDC GLUCOMTR-MCNC: 115 MG/DL (ref 70–130)
GLUCOSE BLDC GLUCOMTR-MCNC: 123 MG/DL (ref 70–130)

## 2018-01-31 PROCEDURE — 82962 GLUCOSE BLOOD TEST: CPT

## 2018-01-31 PROCEDURE — 99225 PR SBSQ OBSERVATION CARE/DAY 25 MINUTES: CPT | Performed by: INTERNAL MEDICINE

## 2018-01-31 PROCEDURE — 94799 UNLISTED PULMONARY SVC/PX: CPT

## 2018-01-31 PROCEDURE — G0378 HOSPITAL OBSERVATION PER HR: HCPCS

## 2018-01-31 PROCEDURE — 99212 OFFICE O/P EST SF 10 MIN: CPT | Performed by: ORTHOPAEDIC SURGERY

## 2018-01-31 PROCEDURE — 96372 THER/PROPH/DIAG INJ SC/IM: CPT

## 2018-01-31 PROCEDURE — 25010000002 HEPARIN (PORCINE) PER 1000 UNITS: Performed by: NURSE PRACTITIONER

## 2018-01-31 RX ADMIN — PANTOPRAZOLE SODIUM 40 MG: 40 TABLET, DELAYED RELEASE ORAL at 05:07

## 2018-01-31 RX ADMIN — CLONAZEPAM 0.5 MG: 0.5 TABLET ORAL at 10:19

## 2018-01-31 RX ADMIN — TRAZODONE HYDROCHLORIDE 50 MG: 50 TABLET ORAL at 21:02

## 2018-01-31 RX ADMIN — ATORVASTATIN CALCIUM 40 MG: 40 TABLET, FILM COATED ORAL at 20:55

## 2018-01-31 RX ADMIN — BUSPIRONE HYDROCHLORIDE 5 MG: 10 TABLET ORAL at 09:29

## 2018-01-31 RX ADMIN — GABAPENTIN 400 MG: 400 CAPSULE ORAL at 20:57

## 2018-01-31 RX ADMIN — HYDROCODONE BITARTRATE AND ACETAMINOPHEN 1 TABLET: 7.5; 325 TABLET ORAL at 10:29

## 2018-01-31 RX ADMIN — ASPIRIN 81 MG: 81 TABLET, COATED ORAL at 09:29

## 2018-01-31 RX ADMIN — HEPARIN SODIUM 5000 UNITS: 5000 INJECTION, SOLUTION INTRAVENOUS; SUBCUTANEOUS at 20:55

## 2018-01-31 RX ADMIN — FLUTICASONE PROPIONATE 2 SPRAY: 50 SPRAY, METERED NASAL at 09:28

## 2018-01-31 RX ADMIN — CLOPIDOGREL BISULFATE 75 MG: 75 TABLET ORAL at 09:28

## 2018-01-31 RX ADMIN — OXYCODONE HYDROCHLORIDE AND ACETAMINOPHEN 500 MG: 500 TABLET ORAL at 09:29

## 2018-01-31 RX ADMIN — CLONAZEPAM 0.5 MG: 0.5 TABLET ORAL at 20:56

## 2018-01-31 RX ADMIN — BUSPIRONE HYDROCHLORIDE 5 MG: 10 TABLET ORAL at 20:56

## 2018-01-31 RX ADMIN — BENZTROPINE MESYLATE 0.5 MG: 1 TABLET ORAL at 17:31

## 2018-01-31 RX ADMIN — FLUOXETINE 10 MG: 10 CAPSULE ORAL at 09:29

## 2018-01-31 RX ADMIN — LIDOCAINE 1 PATCH: 50 PATCH CUTANEOUS at 09:30

## 2018-01-31 RX ADMIN — OLANZAPINE 10 MG: 5 TABLET, FILM COATED ORAL at 20:55

## 2018-01-31 RX ADMIN — Medication 325 MG: at 09:29

## 2018-01-31 RX ADMIN — TRAMADOL HYDROCHLORIDE 50 MG: 50 TABLET, COATED ORAL at 01:34

## 2018-01-31 RX ADMIN — ACETAMINOPHEN 650 MG: 325 TABLET, FILM COATED ORAL at 20:55

## 2018-01-31 RX ADMIN — PERPHENAZINE 4 MG: 2 TABLET, FILM COATED ORAL at 09:29

## 2018-01-31 RX ADMIN — HYDROCODONE BITARTRATE AND ACETAMINOPHEN 1 TABLET: 7.5; 325 TABLET ORAL at 17:31

## 2018-01-31 RX ADMIN — AMLODIPINE BESYLATE 10 MG: 10 TABLET ORAL at 09:28

## 2018-01-31 RX ADMIN — GABAPENTIN 400 MG: 400 CAPSULE ORAL at 09:28

## 2018-01-31 RX ADMIN — HEPARIN SODIUM 5000 UNITS: 5000 INJECTION, SOLUTION INTRAVENOUS; SUBCUTANEOUS at 09:28

## 2018-01-31 RX ADMIN — TRAMADOL HYDROCHLORIDE 50 MG: 50 TABLET, COATED ORAL at 13:15

## 2018-02-01 LAB
BACTERIA SPEC AEROBE CULT: NORMAL
BACTERIA SPEC AEROBE CULT: NORMAL
GLUCOSE BLDC GLUCOMTR-MCNC: 117 MG/DL (ref 70–130)
GLUCOSE BLDC GLUCOMTR-MCNC: 126 MG/DL (ref 70–130)
GLUCOSE BLDC GLUCOMTR-MCNC: 134 MG/DL (ref 70–130)
GLUCOSE BLDC GLUCOMTR-MCNC: 140 MG/DL (ref 70–130)

## 2018-02-01 PROCEDURE — 99233 SBSQ HOSP IP/OBS HIGH 50: CPT | Performed by: INTERNAL MEDICINE

## 2018-02-01 PROCEDURE — 82962 GLUCOSE BLOOD TEST: CPT

## 2018-02-01 PROCEDURE — G0378 HOSPITAL OBSERVATION PER HR: HCPCS

## 2018-02-01 PROCEDURE — 96372 THER/PROPH/DIAG INJ SC/IM: CPT

## 2018-02-01 PROCEDURE — 97110 THERAPEUTIC EXERCISES: CPT

## 2018-02-01 PROCEDURE — 25010000002 HEPARIN (PORCINE) PER 1000 UNITS: Performed by: NURSE PRACTITIONER

## 2018-02-01 PROCEDURE — 97116 GAIT TRAINING THERAPY: CPT

## 2018-02-01 PROCEDURE — 99212 OFFICE O/P EST SF 10 MIN: CPT | Performed by: ORTHOPAEDIC SURGERY

## 2018-02-01 RX ADMIN — HYDROCODONE BITARTRATE AND ACETAMINOPHEN 1 TABLET: 7.5; 325 TABLET ORAL at 15:26

## 2018-02-01 RX ADMIN — TRAZODONE HYDROCHLORIDE 50 MG: 50 TABLET ORAL at 20:30

## 2018-02-01 RX ADMIN — PANTOPRAZOLE SODIUM 40 MG: 40 TABLET, DELAYED RELEASE ORAL at 05:30

## 2018-02-01 RX ADMIN — OLANZAPINE 10 MG: 5 TABLET, FILM COATED ORAL at 20:29

## 2018-02-01 RX ADMIN — LIDOCAINE 1 PATCH: 50 PATCH CUTANEOUS at 08:03

## 2018-02-01 RX ADMIN — ATORVASTATIN CALCIUM 40 MG: 40 TABLET, FILM COATED ORAL at 20:30

## 2018-02-01 RX ADMIN — GABAPENTIN 400 MG: 400 CAPSULE ORAL at 08:02

## 2018-02-01 RX ADMIN — BUSPIRONE HYDROCHLORIDE 5 MG: 10 TABLET ORAL at 08:02

## 2018-02-01 RX ADMIN — FLUTICASONE PROPIONATE 2 SPRAY: 50 SPRAY, METERED NASAL at 08:06

## 2018-02-01 RX ADMIN — Medication 325 MG: at 08:02

## 2018-02-01 RX ADMIN — CETIRIZINE HYDROCHLORIDE 10 MG: 10 TABLET, FILM COATED ORAL at 20:30

## 2018-02-01 RX ADMIN — ASPIRIN 81 MG: 81 TABLET, COATED ORAL at 08:03

## 2018-02-01 RX ADMIN — POLYETHYLENE GLYCOL 3350 17 G: 17 POWDER, FOR SOLUTION ORAL at 08:03

## 2018-02-01 RX ADMIN — BENZTROPINE MESYLATE 0.5 MG: 1 TABLET ORAL at 17:02

## 2018-02-01 RX ADMIN — HYDROCODONE BITARTRATE AND ACETAMINOPHEN 1 TABLET: 7.5; 325 TABLET ORAL at 05:30

## 2018-02-01 RX ADMIN — GABAPENTIN 400 MG: 400 CAPSULE ORAL at 20:29

## 2018-02-01 RX ADMIN — BUSPIRONE HYDROCHLORIDE 5 MG: 10 TABLET ORAL at 20:29

## 2018-02-01 RX ADMIN — FLUOXETINE 10 MG: 10 CAPSULE ORAL at 08:02

## 2018-02-01 RX ADMIN — DOCUSATE SODIUM 100 MG: 100 CAPSULE, LIQUID FILLED ORAL at 08:03

## 2018-02-01 RX ADMIN — HYDROCODONE BITARTRATE AND ACETAMINOPHEN 1 TABLET: 7.5; 325 TABLET ORAL at 20:29

## 2018-02-01 RX ADMIN — HEPARIN SODIUM 5000 UNITS: 5000 INJECTION, SOLUTION INTRAVENOUS; SUBCUTANEOUS at 08:02

## 2018-02-01 RX ADMIN — AMLODIPINE BESYLATE 10 MG: 10 TABLET ORAL at 08:03

## 2018-02-01 RX ADMIN — PERPHENAZINE 4 MG: 2 TABLET, FILM COATED ORAL at 08:03

## 2018-02-01 RX ADMIN — OXYCODONE HYDROCHLORIDE AND ACETAMINOPHEN 500 MG: 500 TABLET ORAL at 08:40

## 2018-02-01 RX ADMIN — CLOPIDOGREL BISULFATE 75 MG: 75 TABLET ORAL at 08:12

## 2018-02-01 RX ADMIN — HEPARIN SODIUM 5000 UNITS: 5000 INJECTION, SOLUTION INTRAVENOUS; SUBCUTANEOUS at 20:31

## 2018-02-01 RX ADMIN — HYDROCODONE BITARTRATE AND ACETAMINOPHEN 1 TABLET: 7.5; 325 TABLET ORAL at 11:20

## 2018-02-01 RX ADMIN — TRAMADOL HYDROCHLORIDE 50 MG: 50 TABLET, COATED ORAL at 22:39

## 2018-02-01 NOTE — PROGRESS NOTES
"  SUBJECTIVE  Patient states shoulder pain improved this morning.  No events overnight.     OBJECTIVE  Temp (24hrs), Av.1 °F (36.7 °C), Min:98 °F (36.7 °C), Max:98.3 °F (36.8 °C)    Blood pressure 136/86, pulse 84, temperature 98.3 °F (36.8 °C), temperature source Oral, resp. rate 18, height 160 cm (63\"), weight 84.8 kg (187 lb), SpO2 97 %.    Lab Results (last 24 hours)     Procedure Component Value Units Date/Time    POC Glucose Once [[id number]]  (Normal) Collected:  18 0734    Specimen:  Blood Updated:  18 0738     Glucose 115 mg/dL     Narrative:       Meter: [id number] : [id number] Mathis Anabella    POC Glucose Once [[id number]]  (Normal) Collected:  18 1158    Specimen:  Blood Updated:  18 1159     Glucose 123 mg/dL     Narrative:       Meter: [id number] : [id number] Mathis Anabella    Blood Culture - Blood, [[id number]]  (Normal) Collected:  18 1255    Specimen:  Blood from Arm, Left Updated:  18 1316     Blood Culture No growth at 4 days    Blood Culture - Blood, [[id number]]  (Normal) Collected:  18 1255    Specimen:  Blood from Arm, Right Updated:  18 1316     Blood Culture No growth at 4 days    POC Glucose Once [[id number]]  (Normal) Collected:  18 1643    Specimen:  Blood Updated:  18 1644     Glucose 105 mg/dL     Narrative:       Meter: [id number] : [id number] Mathis Anabella    POC Glucose Once [[id number]]  (Normal) Collected:  18    Specimen:  Blood Updated:  18     Glucose 106 mg/dL     Narrative:       Meter: [id number] : [id number] Amadouu Kerrie            PHYSICAL EXAM  Right upper extremity exam: Focally tender to palpation about the right proximal humerus.  Shoulder range of motion is limited secondary to pain.Intact EPL, FPL, EDC, FDP, FDS, interosseous, wrist flexion, wrist extension, biceps, triceps, deltoid. Sensation intact light touch to median, radial, ulnar, and axillary nerves. Palpable " radial pulse.  Dependent edema localized at elbow and forearm resolving ecchymoses       Active Problems:    Immobility    CAD (coronary artery disease)    Diabetes mellitus, type II    HTN (hypertension)    HLD (hyperlipidemia)    Schizophrenia    Anemia    Tremor      PLAN / DISPOSITION:  Right proximal humerus fracture    Continue nonoperative treatment.  Sling for comfort.   Pendulum exercises for range of motion as tolerated  Nonweightbearing right upper extremity  Follow-up with Dr. Guidry or [facility] orthopedist in 1-2 weeks for reevaluation.    Igor Guidry MD  02/01/18  7:02 AM

## 2018-02-01 NOTE — PROGRESS NOTES
[facility] Medicine Services  PROGRESS NOTE    Patient Name: Liane Griffin  : 1950  MRN: [id number]    Date of Admission: 2018  Length of Stay: 0  Primary Care Physician: No Known Provider    Subjective   Subjective     CC: AMS    HPI:  Pt sitting up in chair at bedside- states that her right arm is in an uncomfortable position, but, when I attempt to reposition it, she states it was better the other way.  No issues overnight.     Review of Systems  Gen- No fevers, chills  CV- No chest pain, palpitations  Resp- No cough, dyspnea  GI- No N/V/D, abd pain    Otherwise ROS is negative except as mentioned in the HPI.    Objective   Objective     Vital Signs:   Temp:  [97.8 °F (36.6 °C)-98.3 °F (36.8 °C)] 97.8 °F (36.6 °C)  Heart Rate:  [59-84] 67  Resp:  [18] 18  BP: (114-140)/(63-87) 140/67        Physical Exam:  Constitutional: No acute distress, awake, sitting in bedside chair  HENT: NCAT, mucous membranes moist  Respiratory: Clear to auscultation bilaterally, respiratory effort normal   Cardiovascular: RRR, no murmurs, rubs, or gallops, palpable pedal pulses bilaterally  Gastrointestinal: Positive bowel sounds, soft, nontender, nondistended  Musculoskeletal: Right arm with mild edema, immobilized, bruising noted over anterior shoulder  Psychiatric: Appropriate affect, cooperative  Neurologic: Alert and interactive, disoriented, strength symmetric in all extremities, Cranial Nerves grossly intact to confrontation, speech clear  Skin: No rashes    Results Reviewed:  I have personally reviewed current lab, radiology, and data and agree.      Results from last 7 days  Lab Units 18  0629 18  1255   WBC 10*3/mm3 7.47 10.44   HEMOGLOBIN g/dL 8.7* 9.4*   HEMATOCRIT % 28.0* 28.8*   PLATELETS 10*3/mm3 382 416       Results from last 7 days  Lab Units 18  0629 18  1255   SODIUM mmol/L 141 142   POTASSIUM mmol/L 3.7 3.7   CHLORIDE mmol/L 101 104   CO2 mmol/L 30.0  30.0   BUN mg/dL 17 21   CREATININE mg/dL 0.70 0.80   GLUCOSE mg/dL 112* 113*   CALCIUM mg/dL 8.8 9.5   ALT (SGPT) U/L  --  54*   AST (SGOT) U/L  --  52*     Estimated Creatinine Clearance: 68.5 mL/min (by C-G formula based on Cr of 0.7).  No results found for: BNP  No results found for: PHART    Microbiology Results Abnormal     Procedure Component Value - Date/Time    Blood Culture - Blood, [[id number]]  (Normal) Collected:  01/27/18 1255    Lab Status:  Preliminary result Specimen:  Blood from Arm, Left Updated:  01/31/18 1316     Blood Culture No growth at 4 days    Blood Culture - Blood, [[id number]]  (Normal) Collected:  01/27/18 1255    Lab Status:  Preliminary result Specimen:  Blood from Arm, Right Updated:  01/31/18 1316     Blood Culture No growth at 4 days    Influenza A & B, RT PCR - Swab, Nasopharynx [[id number]]  (Normal) Collected:  01/28/18 0542    Lab Status:  Final result Specimen:  Swab from Nasopharynx Updated:  01/28/18 1101     Influenza A PCR Not Detected     Influenza B PCR Not Detected    Influenza Antigen, Rapid - Swab, Nasopharynx [[id number]]  (Normal) Collected:  01/27/18 1305    Lab Status:  Final result Specimen:  Swab from Nasopharynx Updated:  01/27/18 1332     Influenza A Ag, EIA Negative     Influenza B Ag, EIA Negative        Xray reviewed with humeral fracture. Agree with interpretation.  Imaging Results (last 24 hours)     ** No results found for the last 24 hours. **             I have reviewed the medications.    Assessment/Plan   Assessment / Plan     Hospital Problem List     Immobility    CAD (coronary artery disease) (Chronic)    Diabetes mellitus, type II (Chronic)    HTN (hypertension) (Chronic)    HLD (hyperlipidemia) (Chronic)    Schizophrenia (Chronic)    Anemia    Tremor           Brief Hospital Course to date:  Liane Griffin is a 67 y.o. female who was discharged to her [facility] after a humeral fracture admitted to our facility because her [facility] felt she needed more  help.     Assessment & Plan:  --Continue nonoperative mgmt and pain control. Non weight bearing to RUE. Ortho following  --Unclear of her baseline mental status given her schizophrenia she continues to remain intermittently altered. Continue psych meds.  --Anemia studies c/w ERICA. On PO iron.  --CM following, difficult placement, awaiting medicaid pending bed, several facilities already denying her  --PRN pain meds    DVT Prophylaxis:  [facility]    CODE STATUS: Full Code    Disposition: I expect the patient to be discharged when bed available, CM working on difficult placement

## 2018-02-01 NOTE — PROGRESS NOTES
Adult Nutrition  Assessment/PES    Patient Name:  Liane Griffin  YOB: 1950  MRN: [id number]  Admit Date:  1/27/2018    Assessment Date:  2/1/2018    Comments:            Reason for Assessment       02/01/18 1636    Reason for Assessment    Cardiac CAD    Endocrine DM      02/01/18 1633    Reason for Assessment    Reason For Assessment/Visit length of stay    Time Spent (min) 20    Diagnosis --   per MD notes this adm, including anemia a tremor                Anthropometrics       02/01/18 1634    Anthropometrics    Height --   63in    Weight --   187lb    Anthropometrics (Special Considerations)    RD Calculated BMI (kg/m2) --   33.1                  Nutrition Prescription Ordered       02/01/18 1634    Nutrition Prescription PO    Common Modifiers Cardiac;Consistent Carbohydrate            Evaluation of Received Nutrient/Fluid Intake       02/01/18 1634    PO Evaluation    Number of Meals 4    % PO Intake 75            Problem/Interventions:        Problem 1       02/01/18 1634    Nutrition Diagnoses Problem 1    Problem 1 No Nutrition Diagnosis at this Time                    Intervention Goal       02/01/18 1634    Intervention Goal    PO Maintain intake            Nutrition Intervention       02/01/18 1634    Nutrition Intervention    RD/Tech Action Follow Tx progress              Education/Evaluation       02/01/18 1634    Monitor/Evaluation    Monitor Per protocol        Electronically signed by:  Pia Knight MS,RD,LD  02/01/18 4:37 PM

## 2018-02-01 NOTE — PROGRESS NOTES
Continued Stay Note  [facility]     Patient Name: Liane Griffin  MRN: [id number]  Today's Date: 2/1/2018    Admit Date: 1/27/2018          Discharge Plan       02/01/18 1057    Case Management/Social Work Plan    Plan Discharge Planning     Patient/Family In Agreement With Plan yes    Additional Comments CM continues to work on rehab placement  with probable transistion to long term care. Called Juan Manuel Jo with [facility] and rec'd list of skilled facilities in par with [facility] Medicare Replacement Policy. Spoke with Maude/Estrella and she is  evaluating for [facility] and [facility]. Called and spoke with patient's cousin Laxmi and updated her on discharge planning. Laxmi agrees to help facility with paperwork to apply for  long term medicaid. Laxmi requested clinical info to be faxed to [facility] in [employer] Info faxed. Anne Marie @ [phone]               Discharge Codes     None        Expected Discharge Date and Time     Expected Discharge Date Expected Discharge Time    Feb 2, 2018             Damaris Katz RN

## 2018-02-01 NOTE — PLAN OF CARE
Problem: Patient Care Overview (Adult)  Goal: Plan of Care Review  Outcome: Ongoing (interventions implemented as appropriate)   02/01/18 6296   Outcome Evaluation   Outcome Summary/Follow up Plan Ambulating with assist, cooperative with care, VSS.

## 2018-02-01 NOTE — THERAPY TREATMENT NOTE
Acute Care - Physical Therapy Treatment Note  [facility]     Patient Name: Liane Griffin  : 1950  MRN: [id number]  Today's Date: 2018  Onset of Illness/Injury or Date of Surgery Date: 18  Date of Referral to PT: 18  Referring Physician: JOE Mckeon    Admit Date: 2018    Visit Dx:    ICD-10-CM ICD-9-CM   1. Torticollis, acute M43.6 723.5   2. 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 Physical deconditioning R53.81 799.3   4. Impaired mobility and ADLs Z74.09 799.89   5. Impaired functional mobility, balance, gait, and endurance Z74.09 V49.89     Patient Active Problem List   Diagnosis   • Immobility   • CAD (coronary artery disease)   • Diabetes mellitus, type II   • HTN (hypertension)   • HLD (hyperlipidemia)   • Schizophrenia   • Anemia   • Tremor               Adult Rehabilitation Note       18 1055 18 1328 18 0936    Rehab Assessment/Intervention    Discipline physical therapy assistant  -UD occupational therapist  -SHARON physical therapist  -KR    Document Type therapy note (daily note)  -UD therapy note (daily note)  -SHARON therapy note (daily note)  -KR    Subjective Information agree to therapy;complains of;pain  -UD agree to therapy;complains of;pain;fatigue  -SHARON agree to therapy;complains of;pain  -KR    Patient Effort, Rehab Treatment good  -UD adequate  -SHARON good  -KR    Symptoms Noted During/After Treatment fatigue  -UD none  -SHARON increased pain  -KR    Precautions/Limitations fall precautions  -UD fall precautions   brace on when up, R sh humerus fx, NWB RUE.  -SHARON fall precautions;brace on when up;shoulder precautions;non-weight bearing status   NWB RUE  -KR    Specific Treatment Considerations rt arm in sling  -UD pt. with resting tremors, pt. very needing multiple cues.  -SHARON     Recorded by [UD] Loli León, PTA [SHARON] Myrtle Williamson, OT [KR] Ava Aly, PT    Vital Signs    Pre  Systolic BP Rehab  114  -SHARON 144  -KR    Pre Treatment Diastolic BP  63  -SHARON 72  -KR    Pretreatment Heart Rate (beats/min)  64  -SHARON 65  -KR    Posttreatment Heart Rate (beats/min)  65  -SHARON 69  -KR    Pre SpO2 (%)  95  -SHARON 95  -KR    O2 Delivery Pre Treatment  room air  -SHARON room air  -KR    Post SpO2 (%)  93  -SHARON 95  -KR    O2 Delivery Post Treatment  room air  -SHARON room air  -KR    Pre Patient Position Sitting  -UD Supine  -SHARON Supine  -KR    Intra Patient Position Standing  -UD Standing  -SHARON Standing  -KR    Post Patient Position Sitting  -UD Supine  -SHARON Supine  -KR    Recorded by [UD] Loli León PTA [SHARON] Myrtle Williamson, OT [KR] Ava Aly, PT    Pain Assessment    Pain Assessment Williamson-Mcallister FACES  -UD 0-10  -SHARON 0-10  -KR    Williamson-Mcallister FACES Pain Rating 6  -UD      Pain Score 6  -UD 7  -SHARON 2  -KR    Post Pain Score 6  -UD 7  -SHARON 4  -KR    Pain Type Acute pain  -UD Acute pain  -SHARON Acute pain  -KR    Pain Location Arm  -UD Shoulder  -SHARON Neck   increased R shoulder pain after activity  -KR    Pain Orientation Right  -UD Right  -SHARON     Pain Intervention(s) Repositioned  -UD --   premedicated  -SHARON Repositioned;Ambulation/increased activity  -KR    Response to Interventions  tolerated  -SHARON tolerated  -KR    Recorded by [UD] Loli León PTA [SHARON] Myrtle Williamson, OT [KR] Ava Ayl, PT    Cognitive Assessment/Intervention    Current Cognitive/Communication Assessment  impaired   slow processing.  -SHARON functional  -KR    Orientation Status oriented to;person;situation;place  -UD oriented to;person   other not reassessed.  -SHARON oriented to;person;place;time   intermittent confusion during conversation  -KR    Follows Commands/Answers Questions 100% of the time  -UD 75% of the time;able to follow single-step instructions;needs cueing;needs increased time;needs repetition  -SHARON able to follow single-step instructions;100% of the time;needs cueing;needs increased time  -KR    Personal Safety  mild impairment  -SHARON mild  impairment;decreased awareness, need for assist;decreased awareness, need for safety  -KR    Personal Safety Interventions fall prevention program maintained  -UD fall prevention program maintained;gait belt;nonskid shoes/slippers when out of bed  -SHARON fall prevention program maintained;gait belt;nonskid shoes/slippers when out of bed  -KR    Recorded by [UD] Loli León PTA [SHARON] Myrtle Williamson, OT [KR] Ava Aly, PT    Bed Mobility, Assessment/Treatment    Bed Mob, Supine to Sit, [place] not tested   up in chair  -UD  moderate assist (50% patient effort);2 person assist required;verbal cues required  -KR    Bed Mob, Sit to Supine, [place]   moderate assist (50% patient effort);2 person assist required;verbal cues required  -KR    Bed Mobility, Safety Issues   decreased use of arms for pushing/pulling;decreased use of legs for bridging/pushing  -KR    Bed Mobility, Impairments   strength decreased;impaired balance  -KR    Bed Mobility, Comment  Per pt. she just laid back down after being up in recliner since 3am this morning.  Pt. declined back OOB.  -SHARON VC's for proper sequencing. Pt required increased assistance at trunk.   -KR    Recorded by [UD] Loli León PTA [SHARON] Myrtle Williamson, OT [KR] Ava Aly, PT    Transfer Assessment/Treatment    Transfers, Sit-Stand [place] minimum assist (75% patient effort);2 person assist required  -UD  minimum assist (75% patient effort);2 person assist required;verbal cues required  -KR    Transfers, Stand-Sit [place] minimum assist (75% patient effort);2 person assist required  -UD  minimum assist (75% patient effort);2 person assist required;verbal cues required  -KR    Transfers, Sit-Stand-Sit, Assist Device   --   HHA on L  -KR    Transfer, Safety Issues   weight-shifting ability decreased;step length decreased  -KR    Transfer, Impairments   strength decreased;impaired balance;postural control impaired  -KR    Transfer, Comment  Pt. declined  OOB  -SHARON VC's for hand placement and proper weight shifting.  -KR    Recorded by [UD] Loli León PTA [SHARON] Myrtle Williamson, OT [KR] Ava Aly, PT    Gait Assessment/Treatment    Gait, [place] Level minimum assist (75% patient effort);2 person assist required;hand held assist  -UD  minimum assist (75% patient effort);2 person assist required;verbal cues required  -KR    Gait, Assistive Device   --   HHA LUE  -KR    Gait, Distance (Feet) 250  -UD  225  -KR    Gait, Gait Pattern Analysis   swing-to gait  -KR    Gait, Gait Deviations isaiah decreased;narrow base  -UD  isaiah decreased;step length decreased;weight-shifting ability decreased  -KR    Gait, Safety Issues step length decreased  -UD  step length decreased;weight-shifting ability decreased;sequencing ability decreased  -KR    Gait, Impairments strength decreased  -UD  strength decreased;impaired balance;postural control impaired  -KR    Gait, Comment --   cues for posture  -UD  Pt demonstrated short, quick steps. Pt given VC's for upright posture and looking straight ahead while walking.   -KR    Recorded by [UD] Loli León PTA  [KR] Ava Aly, PT    Upper Body Dressing Assessment/Training    UB Dressing Assess/Train, Comment  Pt. declined to sit up to address.  -SHARON     Recorded by  [SHARON] Myrtle Williamson OT     Motor Skills/Interventions    Additional Documentation   Balance Skills Training (Group)  -KR    Recorded by   [KR] Ava Aly, PT    Balance Skills Training    Sitting-Level of Assistance   Close supervision  -KR    Sitting-Balance Support   Feet supported  -KR    Standing-Level of Assistance   Contact guard  -KR    Static Standing Balance Support   Left upper extremity supported  -KR    Gait Balance-Level of Assistance   Contact guard;x2  -KR    Gait Balance Support   Left upper extremity supported  -KR    Recorded by   [KR] Ava Aly, PT    Therapy Exercises    Bilateral Lower Extremities AROM:;10 reps;sitting  -UD  AROM:;AAROM:;10 reps;supine;glut sets;ankle pumps/circles   knee presses, tactile and verbal cues  -SHARON     Right Upper Extremity  AROM:;AAROM:;10 reps;supine;elbow flexion/extension;pronation/supination;hand pumps   finger abd/add, wrist F/E  -SHARON     Left Upper Extremity  AROM:;15 reps;supine;elbow flexion/extension;shoulder abduction/adduction;shoulder extension/flexion;shoulder horizontal abd/add;shoulder protraction/retraction;shoulder rolls/shrugs   verbal and tactile cueing for full range.  -SHARON     LUE Resistance  manual resistance- minimal   biceps and triceps  -SHARON     Recorded by [UD] Loli León PTA [SHARON] Myrtle Williamson OT     Positioning and Restraints    Pre-Treatment Position sitting in chair/recliner  -UD in bed  -SHARON in bed  -KR    Post Treatment Position chair  -UD bed  -SAHRON bed  -KR    In Bed  supine;call light within reach;encouraged to call for assist;RUE elevated;exit alarm on  -SHARON notified nsg;supine;call light within reach;encouraged to call for assist;exit alarm on;side rails up x3;RUE elevated  -KR    In Chair notified nsg;reclined;sitting;call light within reach;exit alarm on;legs elevated  -UD      Recorded by [UD] Loli León PTA [SHARON] Myrtle Williamson, MARLON [KR] Ava Aly, PT      User Key  (r) = Recorded By, (t) = Taken By, (c) = Cosigned By    Initials Name Effective Dates    SHARON Myrtle Williamson, OT 06/22/15 -     UD Loli León PTA 06/22/15 -     KR Ava Aly, PT 09/25/17 -                 IP PT Goals       02/01/18 1142 01/30/18 1308 01/29/18 0931    Bed Mobility PT LTG    Bed Mobility PT LTG, Date Goal Reviewed   01/29/18  -AS    Bed Mobility PT LTG, Outcome goal ongoing  -UD goal ongoing  -KR goal ongoing  -AS    Transfer Training PT LTG    Transfer Training PT  LTG, Date Goal Reviewed   01/29/18  -AS    Transfer Training PT LTG, Outcome goal ongoing  -UD goal ongoing  -KR goal ongoing  -AS    Gait Training PT LTG    Gait Training Goal PT LTG, Date Goal Reviewed   01/29/18  -AS     Gait Training Goal PT LTG, Outcome goal ongoing  -UD goal ongoing  -KR goal ongoing  -AS      01/28/18 1159          Bed Mobility PT LTG    Bed Mobility PT LTG, Time to Achieve 4 days  -CT      Bed Mobility PT LTG, Activity Type all bed mobility  -CT      Bed Mobility PT LTG, Only Level independent  -CT      Transfer Training PT LTG    Transfer Training PT LTG, Time to Achieve 4 days  -CT      Transfer Training PT LTG, Only Level independent  -CT      Gait Training PT LTG    Gait Training Goal PT LTG, Date Established 01/28/18  -CT      Gait Training Goal PT LTG, Only Level conditional independence;supervision required  -CT      Gait Training Goal PT LTG, Assist Device other (see comments)  -CT      Gait Training Goal PT LTG, Distance to Achieve hha to 500  -CT        User Key  (r) = Recorded By, (t) = Taken By, (c) = Cosigned By    Initials Name Provider Type    UD Loli León, PTA Physical Therapy Assistant    AS Katerine Laguna, PTA Physical Therapy Assistant    CT Chris Che, PT Physical Therapist    KR Ava Aly, PT Physical Therapist          Physical Therapy Education     Title: PT OT SLP Therapies (Done)     Topic: Physical Therapy (Done)     Point: Mobility training (Done)    Learning Progress Summary    Learner Readiness Method Response Comment Documented by Status   Patient JAN Bui NR UD 02/01/18 1142 Done    Acceptance E VU  KS 02/01/18 0934 Done    Acceptance E NR  KR 01/30/18 1308 Active    Acceptance E NR  AS 01/29/18 0931 Active    Acceptance E NR  CT 01/28/18 1201 Active               Point: Home exercise program (Done)    Learning Progress Summary    Learner Readiness Method Response Comment Documented by Status   Patient JAN Bui NR  UD 02/01/18 1142 Done    Acceptance E VU  KS 02/01/18 0934 Done    Acceptance E NR  AS 01/29/18 0931 Active    Acceptance E NR  CT 01/28/18 1201 Active               Point: Body mechanics (Done)    Learning  Progress Summary    Learner Readiness Method Response Comment Documented by Status   Patient Eager EJAN DU,NR  UD 02/01/18 1142 Done    Acceptance E VU  KS 02/01/18 0934 Done    Acceptance E NR  KR 01/30/18 1308 Active    Acceptance E NR  AS 01/29/18 0931 Active    Acceptance E NR  CT 01/28/18 1201 Active               Point: Precautions (Done)    Learning Progress Summary    Learner Readiness Method Response Comment Documented by Status   Patient Eager EJAN DU,NR  UD 02/01/18 1142 Done    Acceptance E VU  KS 02/01/18 0934 Done    Acceptance E NR  KR 01/30/18 1308 Active    Acceptance E NR  AS 01/29/18 0931 Active    Acceptance E NR  CT 01/28/18 1201 Active                      User Key     Initials Effective Dates Name Provider Type Discipline     06/22/15 -  Loli León, PTA Physical Therapy Assistant PT    AS 06/22/15 -  Katerine Laguna, PTA Physical Therapy Assistant PT    CT 06/19/15 -  Chris Che, PT Physical Therapist PT    KS 09/18/16 -  Jeni Fletcher RN Registered Nurse Nurse     09/25/17 -  Ava Aly, PT Physical Therapist PT                    PT Recommendation and Plan  Planned Therapy Interventions: balance training, bed mobility training, gait training, home exercise program, strengthening  Plan of Care Review  Plan Of Care Reviewed With: patient  Progress: improving  Outcome Summary/Follow up Plan: pt up in chair.needs support for rt arm.ambulat 250 ft today with 2 assist.cues for longer strides          Outcome Measures       02/01/18 1055 01/31/18 1328 01/30/18 0936    How much help from another person do you currently need...    Turning from your back to your side while in flat bed without using bedrails? 2  -UD  2  -KR    Moving from lying on back to sitting on the side of a flat bed without bedrails? 2  -UD  2  -KR    Moving to and from a bed to a chair (including a wheelchair)? 3  -UD  3  -KR    Standing up from a chair using your arms (e.g., wheelchair, bedside chair)? 3   -UD  3  -KR    Climbing 3-5 steps with a railing? 2  -UD  2  -KR    To walk in hospital room? 3  -UD  3  -KR    AM-PAC 6 Clicks Score 15  -UD  15  -KR    How much help from another is currently needed...    Putting on and taking off regular lower body clothing?  1  -SHARON     Bathing (including washing, rinsing, and drying)  1  -SHARON     Toileting (which includes using toilet bed pan or urinal)  1  -SHARON     Putting on and taking off regular upper body clothing  1  -SHARON     Taking care of personal grooming (such as brushing teeth)  2  -SHARON     Eating meals  2  -SHARON     Score  8  -SHARON     Functional Assessment    Outcome Measure Options  AM-PAC 6 Clicks Daily Activity (OT)  -SHARON AM-PAC 6 Clicks Basic Mobility (PT)  -KR      User Key  (r) = Recorded By, (t) = Taken By, (c) = Cosigned By    Initials Name Provider Type    SHARON Williamson, OT Occupational Therapist    NATHALIA León PTA Physical Therapy Assistant    MANJINDER Aly, PT Physical Therapist           Time Calculation:         PT Charges       02/01/18 1145          Time Calculation    PT Received On 02/01/18  -      PT Goal Re-Cert Due Date 02/07/18  -      Time Calculation- PT    Total Timed Code Minutes- PT 23 minute(s)  -UD        User Key  (r) = Recorded By, (t) = Taken By, (c) = Cosigned By    Initials Name Provider Type    NATHALIA León PTA Physical Therapy Assistant          Therapy Charges for Today     Code Description Service Date Service Provider Modifiers Qty    22654187648 HC PT THER PROC EA 15 MIN 2/1/2018 Loli León PTA GP 1    69153437341 HC GAIT TRAINING EA 15 MIN 2/1/2018 Loli León PTA GP 1    81268214358 HC PT THER SUPP EA 15 MIN 2/1/2018 Loli León PTA GP 1          PT G-Codes  PT Professional Judgement Used?: Yes  Outcome Measure Options: AM-PAC 6 Clicks Daily Activity (OT)  Score: 15  Functional Limitation: Mobility: Walking and moving around  Mobility: Walking and Moving Around Current Status (): At least 40 percent  but less than 60 percent impaired, limited or restricted  Mobility: Walking and Moving Around Goal Status (): At least 20 percent but less than 40 percent impaired, limited or restricted    Loli León, PTA  2/1/2018

## 2018-02-02 LAB
GLUCOSE BLDC GLUCOMTR-MCNC: 106 MG/DL (ref 70–130)
GLUCOSE BLDC GLUCOMTR-MCNC: 120 MG/DL (ref 70–130)
GLUCOSE BLDC GLUCOMTR-MCNC: 132 MG/DL (ref 70–130)
GLUCOSE BLDC GLUCOMTR-MCNC: 163 MG/DL (ref 70–130)

## 2018-02-02 PROCEDURE — 99225 PR SBSQ OBSERVATION CARE/DAY 25 MINUTES: CPT | Performed by: INTERNAL MEDICINE

## 2018-02-02 PROCEDURE — 97530 THERAPEUTIC ACTIVITIES: CPT

## 2018-02-02 PROCEDURE — 99212 OFFICE O/P EST SF 10 MIN: CPT | Performed by: ORTHOPAEDIC SURGERY

## 2018-02-02 PROCEDURE — 25010000002 HEPARIN (PORCINE) PER 1000 UNITS: Performed by: NURSE PRACTITIONER

## 2018-02-02 PROCEDURE — 82962 GLUCOSE BLOOD TEST: CPT

## 2018-02-02 PROCEDURE — 96372 THER/PROPH/DIAG INJ SC/IM: CPT

## 2018-02-02 PROCEDURE — G0378 HOSPITAL OBSERVATION PER HR: HCPCS

## 2018-02-02 RX ADMIN — DOCUSATE SODIUM 100 MG: 100 CAPSULE, LIQUID FILLED ORAL at 20:16

## 2018-02-02 RX ADMIN — FLUOXETINE 10 MG: 10 CAPSULE ORAL at 09:16

## 2018-02-02 RX ADMIN — BENZTROPINE MESYLATE 0.5 MG: 1 TABLET ORAL at 09:16

## 2018-02-02 RX ADMIN — POLYETHYLENE GLYCOL 3350 17 G: 17 POWDER, FOR SOLUTION ORAL at 13:26

## 2018-02-02 RX ADMIN — ASPIRIN 81 MG: 81 TABLET, COATED ORAL at 09:17

## 2018-02-02 RX ADMIN — INSULIN LISPRO 2 UNITS: 100 INJECTION, SOLUTION INTRAVENOUS; SUBCUTANEOUS at 12:51

## 2018-02-02 RX ADMIN — CLONAZEPAM 0.5 MG: 0.5 TABLET ORAL at 20:21

## 2018-02-02 RX ADMIN — ATORVASTATIN CALCIUM 40 MG: 40 TABLET, FILM COATED ORAL at 20:16

## 2018-02-02 RX ADMIN — LIDOCAINE 1 PATCH: 50 PATCH CUTANEOUS at 06:53

## 2018-02-02 RX ADMIN — FLUTICASONE PROPIONATE 2 SPRAY: 50 SPRAY, METERED NASAL at 09:14

## 2018-02-02 RX ADMIN — BUSPIRONE HYDROCHLORIDE 5 MG: 10 TABLET ORAL at 09:18

## 2018-02-02 RX ADMIN — AMLODIPINE BESYLATE 10 MG: 10 TABLET ORAL at 09:17

## 2018-02-02 RX ADMIN — DOCUSATE SODIUM 100 MG: 100 CAPSULE, LIQUID FILLED ORAL at 04:08

## 2018-02-02 RX ADMIN — DOCUSATE SODIUM 100 MG: 100 CAPSULE, LIQUID FILLED ORAL at 13:20

## 2018-02-02 RX ADMIN — PANTOPRAZOLE SODIUM 40 MG: 40 TABLET, DELAYED RELEASE ORAL at 09:16

## 2018-02-02 RX ADMIN — GABAPENTIN 400 MG: 400 CAPSULE ORAL at 09:17

## 2018-02-02 RX ADMIN — BUSPIRONE HYDROCHLORIDE 5 MG: 10 TABLET ORAL at 20:17

## 2018-02-02 RX ADMIN — TRAZODONE HYDROCHLORIDE 50 MG: 50 TABLET ORAL at 20:16

## 2018-02-02 RX ADMIN — HYDROCODONE BITARTRATE AND ACETAMINOPHEN 1 TABLET: 7.5; 325 TABLET ORAL at 22:26

## 2018-02-02 RX ADMIN — HEPARIN SODIUM 5000 UNITS: 5000 INJECTION, SOLUTION INTRAVENOUS; SUBCUTANEOUS at 20:17

## 2018-02-02 RX ADMIN — PERPHENAZINE 4 MG: 2 TABLET, FILM COATED ORAL at 09:16

## 2018-02-02 RX ADMIN — ACETAMINOPHEN 650 MG: 325 TABLET, FILM COATED ORAL at 18:11

## 2018-02-02 RX ADMIN — CETIRIZINE HYDROCHLORIDE 10 MG: 10 TABLET, FILM COATED ORAL at 20:17

## 2018-02-02 RX ADMIN — HYDROCODONE BITARTRATE AND ACETAMINOPHEN 1 TABLET: 7.5; 325 TABLET ORAL at 04:09

## 2018-02-02 RX ADMIN — BENZTROPINE MESYLATE 0.5 MG: 1 TABLET ORAL at 18:11

## 2018-02-02 RX ADMIN — HEPARIN SODIUM 5000 UNITS: 5000 INJECTION, SOLUTION INTRAVENOUS; SUBCUTANEOUS at 09:20

## 2018-02-02 RX ADMIN — CLONAZEPAM 0.5 MG: 0.5 TABLET ORAL at 09:20

## 2018-02-02 RX ADMIN — BISACODYL 5 MG: 5 TABLET, COATED ORAL at 13:21

## 2018-02-02 RX ADMIN — OXYCODONE HYDROCHLORIDE AND ACETAMINOPHEN 500 MG: 500 TABLET ORAL at 13:20

## 2018-02-02 RX ADMIN — OLANZAPINE 10 MG: 5 TABLET, FILM COATED ORAL at 22:22

## 2018-02-02 RX ADMIN — GABAPENTIN 400 MG: 400 CAPSULE ORAL at 20:16

## 2018-02-02 RX ADMIN — Medication 325 MG: at 09:15

## 2018-02-02 RX ADMIN — BISACODYL 5 MG: 5 TABLET, COATED ORAL at 04:09

## 2018-02-02 RX ADMIN — HYDROCODONE BITARTRATE AND ACETAMINOPHEN 1 TABLET: 7.5; 325 TABLET ORAL at 09:14

## 2018-02-02 RX ADMIN — CLOPIDOGREL BISULFATE 75 MG: 75 TABLET ORAL at 09:17

## 2018-02-02 NOTE — PLAN OF CARE
Problem: Patient Care Overview (Adult)  Goal: Plan of Care Review  Outcome: Ongoing (interventions implemented as appropriate)   02/02/18 1349   Coping/Psychosocial Response Interventions   Plan Of Care Reviewed With patient   Patient Care Overview   Progress improving   Outcome Evaluation   Outcome Summary/Follow up Plan Pt with good effort and motivation with UE HEP, and requested a review. Pt Mod A to earlene socks using L UE and crossleg technique. Pt CGA STS and to ambulate 60 ft.        Problem: Inpatient Occupational Therapy  Goal: Bed Mobility Goal LTG- OT  Outcome: Ongoing (interventions implemented as appropriate)   01/29/18 1503 02/02/18 1349   Bed Mobility OT LTG   Bed Mobility OT LTG, Time to Achieve 5 days --    Bed Mobility OT LTG, Activity Type supine to sit/sit to supine --    Bed Mobility OT LTG, [place] Level contact guard assist --    Bed Mobility OT LTG, Assist Device bed rails --    Bed Mobility OT LTG, Outcome --  goal ongoing     Goal: Transfer Training Goal 1 LTG- OT  Outcome: Ongoing (interventions implemented as appropriate)   01/29/18 1503 02/02/18 1349   Transfer Training OT LTG   Transfer Training OT LTG, Time to Achieve 5 days --    Transfer Training OT LTG, Activity Type bed to chair /chair to bed;toilet --    Transfer Training OT LTG, [place] Level supervision required --    Transfer Training OT LTG, Assist Device cane, straight --    Transfer Training OT LTG, Additional Goal SPC if needed --    Transfer Training OT LTG, Outcome --  goal ongoing     Goal: UB Dressing Goal LTG- OT  Outcome: Ongoing (interventions implemented as appropriate)   01/29/18 1503 02/02/18 1349   UB Dressing OT LTG   UB Dressing Goal OT LTG, Time to Achieve 5 days --    UB Dressing Goal OT LTG, [place] Level minimum assist (75% patient effort) --    UB Dressing Goal OT LTG, Additional Goal with silverio technique --    UB Dressing Goal OT LTG, Outcome --  goal ongoing     Goal: Strength Goal LTG-  OT  Outcome: Ongoing (interventions implemented as appropriate)   01/31/18 1417 02/02/18 1349   Strength OT LTG   Strength Goal OT LTG, Date Established 01/31/18 --    Strength Goal OT LTG, Time to Achieve 1 wk --    Strength Goal OT LTG, Measure to Achieve Pt. will participated AAROM/AROM BUE (R elbow to hand only) 10-15 reps each session to support ADL and transfer indep. --    Strength Goal OT LTG, Outcome --  goal partially met  (met today for 10reps)

## 2018-02-02 NOTE — PROGRESS NOTES
"  SUBJECTIVE  Patient states shoulder pain improved this morning.  Sitting in chair at bedside.     OBJECTIVE  Temp (24hrs), Av.7 °F (36.5 °C), Min:97.1 °F (36.2 °C), Max:97.9 °F (36.6 °C)    Blood pressure 125/83, pulse 66, temperature 97.9 °F (36.6 °C), temperature source Oral, resp. rate 18, height 160 cm (63\"), weight 84.8 kg (187 lb), SpO2 96 %.    Lab Results (last 24 hours)     Procedure Component Value Units Date/Time    POC Glucose Once [[id number]]  (Normal) Collected:  18 0715    Specimen:  Blood Updated:  18 0717     Glucose 117 mg/dL     Narrative:       Meter: [id number] : [id number] Del Kyle    POC Glucose Once [[id number]]  (Abnormal) Collected:  18 1201    Specimen:  Blood Updated:  18 1203     Glucose 134 (H) mg/dL     Narrative:       Meter: [id number] : [id number] Ele Beasley    Blood Culture - Blood, [[id number]]  (Normal) Collected:  18 1255    Specimen:  Blood from Arm, Left Updated:  18 1316     Blood Culture No growth at 5 days    Blood Culture - Blood, [[id number]]  (Normal) Collected:  18 1255    Specimen:  Blood from Arm, Right Updated:  18 1316     Blood Culture No growth at 5 days    POC Glucose Once [[id number]]  (Normal) Collected:  18 1617    Specimen:  Blood Updated:  18 1618     Glucose 126 mg/dL     Narrative:       Meter: [id number] : [id number] Arron Cruz    POC Glucose Once [[id number]]  (Abnormal) Collected:  18    Specimen:  Blood Updated:  18 204     Glucose 140 (H) mg/dL     Narrative:       Meter: [id number] : [id number] Giovanni Herrera            PHYSICAL EXAM  Right upper extremity exam: Focally tender to palpation about the right proximal humerus.  Shoulder range of motion is limited secondary to pain. Sling in place. Intact EPL, FPL, EDC, FDP, FDS, interosseous, wrist flexion, wrist extension, biceps, triceps, deltoid. Sensation intact light touch to median, radial, ulnar, " and axillary nerves. Palpable radial pulse.  Dependent edema localized at elbow and forearm resolving ecchymoses       Active Problems:    Immobility    CAD (coronary artery disease)    Diabetes mellitus, type II    HTN (hypertension)    HLD (hyperlipidemia)    Schizophrenia    Anemia    Tremor      PLAN / DISPOSITION:  Right proximal humerus fracture    Continue nonoperative treatment.  Sling for comfort.   Pendulum exercises for range of motion as tolerated  Nonweightbearing right upper extremity  Follow-up with Dr. Guidry or [place] [facility] orthopedist in 1-2 weeks for reevaluation.    Igor Guidry MD  02/02/18  6:59 AM

## 2018-02-02 NOTE — THERAPY TREATMENT NOTE
Acute Care - Occupational Therapy Treatment Note  [facility]     Patient Name: Liane Griffin  : 1950  MRN: [id number]  Today's Date: 2018  Onset of Illness/Injury or Date of Surgery Date: 18  Date of Referral to OT: 18  Referring Physician: JOE Mckeon      Admit Date: 2018    Visit Dx:     ICD-10-CM ICD-9-CM   1. Torticollis, acute M43.6 723.5   2. 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 Physical deconditioning R53.81 799.3   4. Impaired mobility and ADLs Z74.09 799.89   5. Impaired functional mobility, balance, gait, and endurance Z74.09 V49.89     Patient Active Problem List   Diagnosis   • Immobility   • CAD (coronary artery disease)   • Diabetes mellitus, type II   • HTN (hypertension)   • HLD (hyperlipidemia)   • Schizophrenia   • Anemia   • Tremor             Adult Rehabilitation Note       18 1303 18 1055 18 1328    Rehab Assessment/Intervention    Discipline occupational therapist  -AC physical therapy assistant  -UD occupational therapist  -SHARON    Document Type therapy note (daily note)  -AC therapy note (daily note)  -UD therapy note (daily note)  -SHARON    Subjective Information agree to therapy  -AC agree to therapy;complains of;pain  -UD agree to therapy;complains of;pain;fatigue  -SHARON    Patient Effort, Rehab Treatment good  -AC good  -UD adequate  -SHARON    Symptoms Noted During/After Treatment  fatigue  -UD none  -SHARON    Precautions/Limitations fall precautions   R displaced prox humerus fx (non-operative)  -AC fall precautions  -UD fall precautions   brace on when up, R sh humerus fx, NWB RUE.  -SHARON    Specific Treatment Considerations Right arm in sling;  Non WB RUE  -AC rt arm in sling  -UD pt. with resting tremors, pt. very needing multiple cues.  -SHARON    Recorded by [AC] Julia Rodriguez, OT [UD] Loli León PTA [SHARON] Myrtle Williamson OT    Vital Signs    Pre Systolic BP Rehab    114  -SHARON    Pre Treatment Diastolic BP   63  -SHARON    Pretreatment Heart Rate (beats/min)   64  -SHARON    Posttreatment Heart Rate (beats/min)   65  -SHARON    Pre SpO2 (%)   95  -SHARON    O2 Delivery Pre Treatment   room air  -SHARON    Post SpO2 (%)   93  -SHARON    O2 Delivery Post Treatment   room air  -SHARON    Pre Patient Position  Sitting  -UD Supine  -SHARON    Intra Patient Position  Standing  -UD Standing  -SHARON    Post Patient Position  Sitting  -UD Supine  -SHARON    Recorded by  [UD] Loli León, PTA [SHARON] Myrtle Williamson, OT    Pain Assessment    Pain Assessment Williamson-Mcallister FACES  -AC Williamson-Baker FACES  -UD 0-10  -SHARON    Williamson-Mcallister FACES Pain Rating 2  -AC 6  -UD     Pain Score 2  -AC 6  -UD 7  -SHARON    Post Pain Score  6  -UD 7  -SHARON    Pain Type Acute pain  -AC Acute pain  -UD Acute pain  -SHARON    Pain Location Arm  -AC Arm  -UD Shoulder  -SHARON    Pain Orientation Right  -AC Right  -UD Right  -SHARON    Pain Intervention(s) Repositioned  -AC Repositioned  -UD --   premedicated  -SHARON    Response to Interventions tolerated  -AC  tolerated  -SHARON    Recorded by [AC] Julia Rodriguez, OT [UD] Loli León, PTA [SHARON] Myrtle Williamson, OT    Cognitive Assessment/Intervention    Current Cognitive/Communication Assessment --   dealyed processing  -AC  impaired   slow processing.  -SHARON    Orientation Status oriented to;person;place;situation  -AC oriented to;person;situation;place  -UD oriented to;person   other not reassessed.  -SHARON    Follows Commands/Answers Questions 100% of the time;needs cueing;needs repetition  -% of the time  -UD 75% of the time;able to follow single-step instructions;needs cueing;needs increased time;needs repetition  -SHARON    Personal Safety mild impairment  -AC  mild impairment  -SHARON    Personal Safety Interventions fall prevention program maintained;gait belt;nonskid shoes/slippers when out of bed  -AC fall prevention program maintained  -UD fall prevention program maintained;gait belt;nonskid shoes/slippers when out of bed  -SHARON     Recorded by [AC] Julia Rodriguez, OT [UD] Loli León PTA [SHARON] Myrtle Williamson, OT    Bed Mobility, Assessment/Treatment    Bed Mob, Supine to Sit, [place]  not tested   up in chair  -UD     Bed Mobility, Comment UIC  -AC  Per pt. she just laid back down after being up in recliner since 3am this morning.  Pt. declined back OOB.  -SHARON    Recorded by [AC] Julia Rodriguez, OT [UD] Loli León PTA [SHARON] Myrtle Williamson OT    Transfer Assessment/Treatment    Transfers, Sit-Stand [place] verbal cues required;contact guard assist  -AC minimum assist (75% patient effort);2 person assist required  -UD     Transfers, Stand-Sit [place] verbal cues required;contact guard assist  -AC minimum assist (75% patient effort);2 person assist required  -UD     Transfers, Sit-Stand-Sit, Assist Device --   gait belt  -AC      Transfer, Impairments strength decreased;impaired balance  -AC      Transfer, Comment   Pt. declined OOB  -SHARON    Recorded by [AC] Julia Rodriguez, OT [UD] Loli León PTA [SHARON] Myrtle Williamson, OT    Gait Assessment/Treatment    Gait, [place] Level  minimum assist (75% patient effort);2 person assist required;hand held assist  -UD     Gait, Distance (Feet)  250  -UD     Gait, Gait Deviations  isaiah decreased;narrow base  -UD     Gait, Safety Issues  step length decreased  -UD     Gait, Impairments  strength decreased  -UD     Gait, Comment  --   cues for posture  -UD     Recorded by  [UD] Loli León PTA     Functional Mobility    Functional Mobility- Ind. Level verbal cues required;contact guard assist  -AC      Functional Mobility- Device --   gait belt  -AC      Functional Mobility-Distance (Feet) 60  -AC      Recorded by [AC] Julia Rodriguez OT      ADL Assessment/Intervention    Additional Documentation Self-Feeding Assessment/Training (Group)  -AC      Recorded by [AC] Julia Rodriguez OT      Upper Body Dressing Assessment/Training    UB Dressing Assess/Train, Comment   Pt. declined to sit up  to address.  -SHARON    Recorded by   [SHARON] Myrtle Williamson OT    Lower Body Dressing Assessment/Training    LB Dressing Assess/Train, Clothing Type doffing:;donning:  -AC      LB Dressing Assess/Train, Position sitting  -AC      LB Dressing Assess/Train, [place] moderate assist (50% patient effort)  -AC      LB Dressing Assess/Train, Comment used left hand and cross leg technique;  required assist to initially place over toes  -AC      Recorded by [AC] Julia Rodriguez OT      Self-Feeding Assessment/Training    Self-Feeding Assess/Train, Comment pt with tremoring, but able to use left hand to drink from cup with lid without spills   -AC      Recorded by [AC] Julia Rodriguez OT      Balance Skills Training    Standing-Level of Assistance Contact guard  -AC      Static Standing Balance Support --   gait belt  -AC      Gait Balance-Level of Assistance Contact guard  -AC      Gait Balance Support --   gait belt  -AC      Recorded by [AC] Julia Rodriguez OT      Therapy Exercises    Bilateral Lower Extremities  AROM:;10 reps;sitting  -UD AROM:;AAROM:;10 reps;supine;glut sets;ankle pumps/circles   knee presses, tactile and verbal cues  -SHARON    Right Upper Extremity AROM:;10 reps;elbow flexion/extension;hand pumps;pronation/supination   wrist f/e  -AC  AROM:;AAROM:;10 reps;supine;elbow flexion/extension;pronation/supination;hand pumps   finger abd/add, wrist F/E  -SHARON    Left Upper Extremity 10 reps;elbow flexion/extension;shoulder extension/flexion;shoulder horizontal abd/add  -AC  AROM:;15 reps;supine;elbow flexion/extension;shoulder abduction/adduction;shoulder extension/flexion;shoulder horizontal abd/add;shoulder protraction/retraction;shoulder rolls/shrugs   verbal and tactile cueing for full range.  -SHARON    LUE Resistance   manual resistance- minimal   biceps and triceps  -SHARON    Recorded by [AC] Julia Rodriguez OT [UD] Loli León, PTA [SHARON] Myrtle Williamson OT    Positioning and Restraints    Pre-Treatment Position  sitting in chair/recliner  -AC sitting in chair/recliner  -UD in bed  -SHARON    Post Treatment Position chair  -AC chair  -UD bed  -SHARON    In Bed   supine;call light within reach;encouraged to call for assist;RUE elevated;exit alarm on  -SHARON    In Chair notified nsg;reclined;call light within reach;encouraged to call for assist;exit alarm on  -AC notified nsg;reclined;sitting;call light within reach;exit alarm on;legs elevated  -UD     Recorded by [AC] Julia Rodriguez, OT [UD] Loli León, PTA [SHARON] Myrtle Williamson, OT      User Key  (r) = Recorded By, (t) = Taken By, (c) = Cosigned By    Initials Name Effective Dates    SHARON Myrtle Williamson, OT 06/22/15 -     AC Julia Rodriguez, OT 06/23/15 -     UD Loli León PTA 06/22/15 -                 OT Goals       02/02/18 1349 01/31/18 1417 01/31/18 1415    Bed Mobility OT LTG    Bed Mobility OT LTG, Outcome goal ongoing  -AC  --   Pt. declined  -SHARON    Transfer Training OT LTG    Transfer Training OT LTG, Outcome goal ongoing  -AC  goal ongoing   per pt. just in bed,  declined  -SHARON    Strength OT LTG    Strength Goal OT LTG, Date Established  01/31/18  -SHARON     Strength Goal OT LTG, Time to Achieve  1 wk  -SHARON     Strength Goal OT LTG, Measure to Achieve  Pt. will participated AAROM/AROM BUE (R elbow to hand only) 10-15 reps each session to support ADL and transfer indep.  -SHARON     Strength Goal OT LTG, Outcome goal partially met   met today for 10reps  -AC goal ongoing  -SHARON     UB Dressing OT LTG    UB Dressing Goal OT LTG, Outcome goal ongoing  -AC  goal ongoing   pt. declined to sit up to address  -SHARON      01/29/18 1503          Bed Mobility OT LTG    Bed Mobility OT LTG, Time to Achieve 5 days  -ST      Bed Mobility OT LTG, Activity Type supine to sit/sit to supine  -ST      Bed Mobility OT LTG, [place] Level contact guard assist  -ST      Bed Mobility OT LTG, Assist Device bed rails  -ST      Bed Mobility OT LTG, Outcome goal ongoing  -ST      Transfer Training OT LTG     Transfer Training OT LTG, Time to Achieve 5 days  -ST      Transfer Training OT LTG, Activity Type bed to chair /chair to bed;toilet  -ST      Transfer Training OT LTG, [place] Level supervision required  -ST      Transfer Training OT LTG, Assist Device cane, straight  -ST      Transfer Training OT LTG, Additional Goal SPC if needed  -ST      Transfer Training OT LTG, Outcome goal ongoing  -ST      UB Dressing OT LTG    UB Dressing Goal OT LTG, Time to Achieve 5 days  -ST      UB Dressing Goal OT LTG, [place] Level minimum assist (75% patient effort)  -ST      UB Dressing Goal OT LTG, Additional Goal with silverio technique  -ST      UB Dressing Goal OT LTG, Outcome goal ongoing  -ST        User Key  (r) = Recorded By, (t) = Taken By, (c) = Cosigned By    Initials Name Provider Type    SHARON Myrtle Williamson, OT Occupational Therapist    AC Julia Rodriguez, OT Occupational Therapist    ST Cindi Marroquin, OTR Occupational Therapist          Occupational Therapy Education     Title: PT OT SLP Therapies (Done)     Topic: Occupational Therapy (Done)     Point: ADL training (Done)    Description: Instruct learner(s) on proper safety adaptation and remediation techniques during self care or transfers.   Instruct in proper use of assistive devices.    Learning Progress Summary    Learner Readiness Method Response Comment Documented by Status   Patient Acceptance E VU compensatory strategy for LBD, reviewed UE HEP AC 02/02/18 1348 Done    EagJAN HarperNR  UD 02/01/18 1142 Done    Acceptance E ANNY  KS 02/01/18 0934 Done    Acceptance E,JAN MCCORMICK,NR UE ROM and LE ROM exer. to support ADL and  mobility indep SHARON 01/31/18 1414 Done    Acceptance TRACEE,JAN ARGUELLES DU role of OT, benfits of activity, WB'ing status, POC, transfers, bed mobility, safety ST 01/29/18 1502 Done               Point: Home exercise program (Done)    Description: Instruct learner(s) on appropriate technique for monitoring, assisting and/or progressing therapeutic  exercises/activities.    Learning Progress Summary    Learner Readiness Method Response Comment Documented by Status   Patient Eager TRACEEJAN,NR   02/01/18 1142 Done    Acceptance E VU  KS 02/01/18 0934 Done    Acceptance E,D VU,NR UE ROM and LE ROM exer. to support ADL and  mobility [facility] 01/31/18 1414 Done    Acceptance KINDRA EASLEY D VU, DU role of OT, benfits of activity, WB'ing status, POC, transfers, bed mobility, safety  01/29/18 1502 Done               Point: Precautions (Done)    Description: Instruct learner(s) on prescribed precautions during self-care and functional transfers.    Learning Progress Summary    Learner Readiness Method Response Comment Documented by Status   Patient Eager TRACEEJAN,NR   02/01/18 1142 Done    Acceptance E VU  KS 02/01/18 0934 Done    Acceptance E,D VU,NR UE ROM and LE ROM exer. to support ADL and  mobility [facility] 01/31/18 1414 Done    Acceptance KINDRA EASLEY D VU, DU role of OT, benfits of activity, WB'ing status, POC, transfers, bed mobility, safety  01/29/18 1502 Done               Point: Body mechanics (Done)    Description: Instruct learner(s) on proper positioning and spine alignment during self-care, functional mobility activities and/or exercises.    Learning Progress Summary    Learner Readiness Method Response Comment Documented by Status   Patient Kadier TRACEEJAN,NR   02/01/18 1142 Done    Acceptance E VU  KS 02/01/18 0934 Done    Acceptance KINDRA EASLEY D VU, DU role of OT, benfits of activity, WB'ing status, POC, transfers, bed mobility, safety  01/29/18 1502 Done                      User Key     Initials Effective Dates Name Provider Type Discipline     06/22/15 -  Myrtle Williamson, OT Occupational Therapist OT     06/23/15 -  Julia Rodriguez, OT Occupational Therapist OT     06/22/15 -  Loli León PTA Physical Therapy Assistant PT    ST 02/20/17 -  Cindi Marroquin, OTR Occupational Therapist OT    KS 09/18/16 -  Jeni Fletcher, RN Registered Nurse Nurse                  OT  Recommendation and Plan  Anticipated Equipment Needs At Discharge:  (TBD based on placement)  Anticipated Discharge Disposition: skilled nursing facility  Planned Therapy Interventions: adaptive equipment training, ADL retraining, balance training, bed mobility training, home exercise program, ROM (Range of Motion), transfer training  Therapy Frequency: daily  Plan of Care Review  Plan Of Care Reviewed With: patient  Progress: improving  Outcome Summary/Follow up Plan: Pt with good effort and motivation with UE HEP, and requested a review.   Pt Mod A to earlene socks using L UE and crossleg technique.   Pt CGA STS and to ambulate 60 ft.         Outcome Measures       02/02/18 1303 02/01/18 1055 01/31/18 1328    How much help from another person do you currently need...    Turning from your back to your side while in flat bed without using bedrails?  2  -UD     Moving from lying on back to sitting on the side of a flat bed without bedrails?  2  -UD     Moving to and from a bed to a chair (including a wheelchair)?  3  -UD     Standing up from a chair using your arms (e.g., wheelchair, bedside chair)?  3  -UD     Climbing 3-5 steps with a railing?  2  -UD     To walk in hospital room?  3  -UD     AM-PAC 6 Clicks Score  15  -UD     How much help from another is currently needed...    Putting on and taking off regular lower body clothing? 2  -AC  1  -SHARON    Bathing (including washing, rinsing, and drying) 2  -AC  1  -SHARON    Toileting (which includes using toilet bed pan or urinal) 2  -AC  1  -SHARON    Putting on and taking off regular upper body clothing 2  -AC  1  -SHARON    Taking care of personal grooming (such as brushing teeth) 2  -AC  2  -SHARON    Eating meals 3  -AC  2  -SHARON    Score 13  -AC  8  -SHARON    Functional Assessment    Outcome Measure Options AM-PAC 6 Clicks Daily Activity (OT)  -AC  AM-PAC 6 Clicks Daily Activity (OT)  -SHARON      User Key  (r) = Recorded By, (t) = Taken By, (c) = Cosigned By    Initials Name Provider Type     SHARON Williamson, OT Occupational Therapist    AC Julia Rodriguez, OT Occupational Therapist    NATHALIA León, PTA Physical Therapy Assistant           Time Calculation:         Time Calculation- OT       02/02/18 1356          Time Calculation- OT    OT Start Time 1303  -AC      Total Timed Code Minutes- OT 25 minute(s)  -AC      OT Received On 02/02/18  -      OT Goal Re-Cert Due Date 02/08/18  -        User Key  (r) = Recorded By, (t) = Taken By, (c) = Cosigned By    Initials Name Provider Type     Julia Rodriguez OT Occupational Therapist           Therapy Charges for Today     Code Description Service Date Service Provider Modifiers Qty    [id number] HC OT THERAPEUTIC ACT EA 15 MIN 2/2/2018 Julia Rodriguez OT GO 2          OT G-codes  OT Professional Judgement Used?: Yes  OT Functional Scales Options: AM-PAC 6 Clicks Daily Activity (OT)  Score: 8  Functional Limitation: Self care  Self Care Current Status (): At least 80 percent but less than 100 percent impaired, limited or restricted  Self Care Goal Status (): At least 60 percent but less than 80 percent impaired, limited or restricted    Julia Rodriguez OT  2/2/2018

## 2018-02-02 NOTE — PROGRESS NOTES
Continued Stay Note  [facility]     Patient Name: Liane Griffin  MRN: [id number]  Today's Date: 2/2/2018    Admit Date: 1/27/2018          Discharge Plan       02/02/18 0941    Case Management/Social Work Plan    Plan Discharge Planning update     Patient/Family In Agreement With Plan yes    Additional Comments Rec'd call from [facility] and they can offer patient a bed pending insurance approval. Spoke with patient's cousin Laxmi this am about patient possibly going back to [facility]. Made Laxmi aware [facility] her here and refused to take her back. so therefore the hospital is working on placement in a nursing home bed. Laxmi is aware nursing home in [facility] is waitng for insurance approval and can offer patient a bed. Laxmi thinks patient is able to go back to [facility] and I [facility] her [facility]. CM following. Anne Marie at [phone]               Discharge Codes     None        Expected Discharge Date and Time     Expected Discharge Date Expected Discharge Time    Feb 2, 2018             Damaris Katz RN

## 2018-02-02 NOTE — PLAN OF CARE
Problem: Patient Care Overview (Adult)  Goal: Plan of Care Review  Outcome: Ongoing (interventions implemented as appropriate)   02/02/18 0448   Coping/Psychosocial Response Interventions   Plan Of Care Reviewed With patient   Patient Care Overview   Progress progress toward functional goals as expected       Problem: Fall Risk (Adult)  Goal: Absence of Falls  Outcome: Ongoing (interventions implemented as appropriate)   02/02/18 0448   Fall Risk (Adult)   Absence of Falls making progress toward outcome

## 2018-02-02 NOTE — PLAN OF CARE
Problem: Fall Risk (Adult)  Goal: Identify Related Risk Factors and Signs and Symptoms  Outcome: Ongoing (interventions implemented as appropriate)   02/02/18 1401   Fall Risk   Fall Risk: Related Risk Factors age-related changes;gait/mobility problems;history of falls;impaired vision;environment unfamiliar   Fall Risk: Signs and Symptoms presence of risk factors

## 2018-02-03 LAB
GLUCOSE BLDC GLUCOMTR-MCNC: 113 MG/DL (ref 70–130)
GLUCOSE BLDC GLUCOMTR-MCNC: 123 MG/DL (ref 70–130)
GLUCOSE BLDC GLUCOMTR-MCNC: 177 MG/DL (ref 70–130)
GLUCOSE BLDC GLUCOMTR-MCNC: 87 MG/DL (ref 70–130)

## 2018-02-03 PROCEDURE — 99224 PR SBSQ OBSERVATION CARE/DAY 15 MINUTES: CPT | Performed by: NURSE PRACTITIONER

## 2018-02-03 PROCEDURE — 25010000002 HEPARIN (PORCINE) PER 1000 UNITS: Performed by: NURSE PRACTITIONER

## 2018-02-03 PROCEDURE — G0378 HOSPITAL OBSERVATION PER HR: HCPCS

## 2018-02-03 PROCEDURE — 97116 GAIT TRAINING THERAPY: CPT

## 2018-02-03 PROCEDURE — 97110 THERAPEUTIC EXERCISES: CPT

## 2018-02-03 PROCEDURE — 96372 THER/PROPH/DIAG INJ SC/IM: CPT

## 2018-02-03 PROCEDURE — 63710000001 INSULIN LISPRO (HUMAN) PER 5 UNITS: Performed by: NURSE PRACTITIONER

## 2018-02-03 PROCEDURE — 82962 GLUCOSE BLOOD TEST: CPT

## 2018-02-03 PROCEDURE — 94799 UNLISTED PULMONARY SVC/PX: CPT

## 2018-02-03 RX ADMIN — TRAMADOL HYDROCHLORIDE 50 MG: 50 TABLET, COATED ORAL at 17:53

## 2018-02-03 RX ADMIN — AMLODIPINE BESYLATE 10 MG: 10 TABLET ORAL at 08:20

## 2018-02-03 RX ADMIN — CETIRIZINE HYDROCHLORIDE 10 MG: 10 TABLET, FILM COATED ORAL at 20:07

## 2018-02-03 RX ADMIN — PERPHENAZINE 4 MG: 2 TABLET, FILM COATED ORAL at 08:19

## 2018-02-03 RX ADMIN — FLUOXETINE 10 MG: 10 CAPSULE ORAL at 08:19

## 2018-02-03 RX ADMIN — ASPIRIN 81 MG: 81 TABLET, COATED ORAL at 08:19

## 2018-02-03 RX ADMIN — HYDROCODONE BITARTRATE AND ACETAMINOPHEN 1 TABLET: 7.5; 325 TABLET ORAL at 20:07

## 2018-02-03 RX ADMIN — CLOPIDOGREL BISULFATE 75 MG: 75 TABLET ORAL at 08:19

## 2018-02-03 RX ADMIN — HYDROCODONE BITARTRATE AND ACETAMINOPHEN 1 TABLET: 7.5; 325 TABLET ORAL at 15:15

## 2018-02-03 RX ADMIN — BUSPIRONE HYDROCHLORIDE 5 MG: 10 TABLET ORAL at 20:07

## 2018-02-03 RX ADMIN — Medication 325 MG: at 08:19

## 2018-02-03 RX ADMIN — GABAPENTIN 400 MG: 400 CAPSULE ORAL at 20:07

## 2018-02-03 RX ADMIN — INSULIN LISPRO 2 UNITS: 100 INJECTION, SOLUTION INTRAVENOUS; SUBCUTANEOUS at 12:50

## 2018-02-03 RX ADMIN — HYDROCODONE BITARTRATE AND ACETAMINOPHEN 1 TABLET: 7.5; 325 TABLET ORAL at 08:19

## 2018-02-03 RX ADMIN — BUSPIRONE HYDROCHLORIDE 5 MG: 10 TABLET ORAL at 08:19

## 2018-02-03 RX ADMIN — ATORVASTATIN CALCIUM 40 MG: 40 TABLET, FILM COATED ORAL at 20:07

## 2018-02-03 RX ADMIN — FLUTICASONE PROPIONATE 2 SPRAY: 50 SPRAY, METERED NASAL at 08:18

## 2018-02-03 RX ADMIN — DOCUSATE SODIUM 100 MG: 100 CAPSULE, LIQUID FILLED ORAL at 08:20

## 2018-02-03 RX ADMIN — LIDOCAINE 1 PATCH: 50 PATCH CUTANEOUS at 08:21

## 2018-02-03 RX ADMIN — OXYCODONE HYDROCHLORIDE AND ACETAMINOPHEN 500 MG: 500 TABLET ORAL at 08:48

## 2018-02-03 RX ADMIN — BISACODYL 5 MG: 5 TABLET, COATED ORAL at 08:20

## 2018-02-03 RX ADMIN — TRAZODONE HYDROCHLORIDE 50 MG: 50 TABLET ORAL at 20:07

## 2018-02-03 RX ADMIN — HEPARIN SODIUM 5000 UNITS: 5000 INJECTION, SOLUTION INTRAVENOUS; SUBCUTANEOUS at 08:18

## 2018-02-03 RX ADMIN — HEPARIN SODIUM 5000 UNITS: 5000 INJECTION, SOLUTION INTRAVENOUS; SUBCUTANEOUS at 20:08

## 2018-02-03 RX ADMIN — POLYETHYLENE GLYCOL 3350 17 G: 17 POWDER, FOR SOLUTION ORAL at 08:21

## 2018-02-03 RX ADMIN — DOCUSATE SODIUM 100 MG: 100 CAPSULE, LIQUID FILLED ORAL at 20:07

## 2018-02-03 RX ADMIN — OLANZAPINE 10 MG: 5 TABLET, FILM COATED ORAL at 20:07

## 2018-02-03 RX ADMIN — GABAPENTIN 400 MG: 400 CAPSULE ORAL at 08:19

## 2018-02-03 RX ADMIN — CLONAZEPAM 0.5 MG: 0.5 TABLET ORAL at 21:43

## 2018-02-03 RX ADMIN — PANTOPRAZOLE SODIUM 40 MG: 40 TABLET, DELAYED RELEASE ORAL at 05:09

## 2018-02-03 RX ADMIN — BENZTROPINE MESYLATE 0.5 MG: 1 TABLET ORAL at 17:13

## 2018-02-03 NOTE — THERAPY TREATMENT NOTE
Acute Care - Physical Therapy Treatment Note  [facility]     Patient Name: Liane Griffin  : 1950  MRN: [id number]  Today's Date: 2/3/2018  Onset of Illness/Injury or Date of Surgery Date: 18  Date of Referral to PT: 18  Referring Physician: JOE Mckeon    Admit Date: 2018    Visit Dx:    ICD-10-CM ICD-9-CM   1. Torticollis, acute M43.6 723.5   2. 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 Physical deconditioning R53.81 799.3   4. Impaired mobility and ADLs Z74.09 799.89   5. Impaired functional mobility, balance, gait, and endurance Z74.09 V49.89     Patient Active Problem List   Diagnosis   • Immobility   • CAD (coronary artery disease)   • Diabetes mellitus, type II   • HTN (hypertension)   • HLD (hyperlipidemia)   • Schizophrenia   • Anemia   • Tremor               Adult Rehabilitation Note       18 1438 18 1303 18 1055    Rehab Assessment/Intervention    Discipline physical therapist  -LM occupational therapist  -AC physical therapy assistant  -UD    Document Type therapy note (daily note)  -LM therapy note (daily note)  -AC therapy note (daily note)  -UD    Subjective Information agree to therapy;complains of;pain  -LM agree to therapy  -AC agree to therapy;complains of;pain  -UD    Patient Effort, Rehab Treatment good  -LM good  -AC good  -UD    Symptoms Noted During/After Treatment fatigue;shortness of breath  -LM  fatigue  -UD    Symptoms Noted Comment SOA following ambulation, O2 sats monitored. RN notified  -LM      Precautions/Limitations fall precautions;non-weight bearing status   NWB RUE, RUE in sling, non-op humerus fx  -LM fall precautions   R displaced prox humerus fx (non-operative)  -AC fall precautions  -UD    Specific Treatment Considerations tremoring of left UE and bilateral LEs after ambulation. Pt desat to high 80s after ambulation, required cues for PLB to recover  -LM  Right arm in sling;  Non WB RUE  -AC rt arm in sling  -UD    Recorded by [LM] Mary Smith, PT [AC] Julia Rodriguez, OT [UD] Loli León, KORINA    Vital Signs    O2 Delivery Pre Treatment room air  -LM      Intra SpO2 (%) 86  -LM      O2 Delivery Intra Treatment room air  -LM      Post SpO2 (%) 92  -LM      O2 Delivery Post Treatment supplemental O2   2L; notified RN  -LM      Pre Patient Position Sitting  -LM  Sitting  -UD    Intra Patient Position Standing  -LM  Standing  -UD    Post Patient Position Supine  -LM  Sitting  -UD    Recorded by [LM] Mary Smith, PT  [UD] Loli León, KORINA    Pain Assessment    Pain Assessment Williamson-Mcallister FACES  -LM Williamson-Mcallister FACES  -AC Williamson-Mcallister FACES  -UD    Williamson-Mcallister FACES Pain Rating 4  -LM 2  -AC 6  -UD    Pain Score  2  -AC 6  -UD    Post Pain Score   6  -UD    Pain Type Acute pain  -LM Acute pain  -AC Acute pain  -UD    Pain Location Arm  -LM Arm  -AC Arm  -UD    Pain Orientation Right  -LM Right  -AC Right  -UD    Pain Intervention(s) Repositioned;Ambulation/increased activity  -LM Repositioned  -AC Repositioned  -UD    Response to Interventions  tolerated  -AC     Recorded by [LM] Mary Smith, PT [AC] Julia Rodriguez, OT [UD] Loli León, KORINA    Cognitive Assessment/Intervention    Current Cognitive/Communication Assessment functional  -LM --   dealyed processing  -AC     Orientation Status oriented x 4  -LM oriented to;person;place;situation  -AC oriented to;person;situation;place  -UD    Follows Commands/Answers Questions 75% of the time;100% of the time;able to follow single-step instructions;needs cueing  -% of the time;needs cueing;needs repetition  -% of the time  -UD    Personal Safety mild impairment;decreased awareness, need for assist;decreased awareness, need for safety;decreased insight to deficits  -LM mild impairment  -AC     Personal Safety Interventions  fall prevention program maintained;gait belt;nonskid shoes/slippers when out of bed   -AC fall prevention program maintained  -UD    Recorded by [LM] Mary Smith, PT [AC] Julia Rodriguez, OT [UD] Loli León PTA    Bed Mobility, Assessment/Treatment    Bed Mobility, Assistive Device head of bed elevated  -LM      Bed Mobility, Scoot/Bridge, [place] maximum assist (25% patient effort);2 person assist required;verbal cues required   scooting upwards in bed  -LM      Bed Mob, Supine to Sit, [place] not tested   Pt received UIC  -LM  not tested   up in chair  -UD    Bed Mob, Sit to Supine, [place] maximum assist (25% patient effort);verbal cues required  -LM      Bed Mobility, Safety Issues decreased use of arms for pushing/pulling;decreased use of legs for bridging/pushing  -LM      Bed Mobility, Impairments strength decreased;impaired balance;pain  -LM      Bed Mobility, Comment Verbal cues for sequencing and NWB status. After returning to bed after ambulation, pt demo SOA and O2 monitored. Cues for PLB. Pt exhibited tremoring in left UE and bilateral LEs, reported it was nervousness for moving right UE. O2 sats increased with rest and PLB.  -LM UIC  -AC     Recorded by [LM] Mary Smith, PT [AC] Julia Rodriguez, OT [UD] Loli León, KORINA    Transfer Assessment/Treatment    Transfers, Sit-Stand [place] contact guard assist;verbal cues required  -LM verbal cues required;contact guard assist  -AC minimum assist (75% patient effort);2 person assist required  -UD    Transfers, Stand-Sit [place] contact guard assist;verbal cues required  -LM verbal cues required;contact guard assist  -AC minimum assist (75% patient effort);2 person assist required  -UD    Transfers, Sit-Stand-Sit, Assist Device other (see comments)   UE support, gait belt  -LM --   gait belt  -AC     Toilet Transfer, [place] contact guard assist;verbal cues required  -LM      Toilet Transfer, Assistive Device elevated toilet seat;other (see comments)   grab bar, gait belt  -LM      Transfer, Safety Issues  sequencing ability decreased;step length decreased;weight-shifting ability decreased  -LM      Transfer, Impairments strength decreased;impaired balance;pain  -LM strength decreased;impaired balance  -AC     Transfer, Comment Verbal cues for correct hand placement and safety.  -LM      Recorded by [LM] Mary Smith, PT [AC] Julia Rodriguez OT [UD] Loli León PTA    Gait Assessment/Treatment    Gait, [place] Level minimum assist (75% patient effort);verbal cues required  -LM  minimum assist (75% patient effort);2 person assist required;hand held assist  -UD    Gait, Assistive Device other (see comments)   hand-held assist LUE, gait belt  -LM      Gait, Distance (Feet) 300  -LM  250  -UD    Gait, Gait Pattern Analysis swing-through gait  -LM      Gait, Gait Deviations bilateral:;decreased heel strike;forward flexed posture;narrow base;step length decreased;toe-to-floor clearance decreased;weight-shifting ability decreased  -LM  isaiah decreased;narrow base  -UD    Gait, Safety Issues balance decreased during turns;sequencing ability decreased;step length decreased;weight-shifting ability decreased  -LM  step length decreased  -UD    Gait, Impairments strength decreased;impaired balance;pain  -LM  strength decreased  -UD    Gait, Comment Pt ambulated with step-through pattern with short, quick steps. Verbal cues to slow pace for safety, especially during turns. Verbal cues for upright posture, widen DEEJAY, and increase step length.  -LM  --   cues for posture  -UD    Recorded by [LM] Mary Smith, PT  [UD] Loli León PTA    Functional Mobility    Functional Mobility- Ind. Level  verbal cues required;contact guard assist  -AC     Functional Mobility- Device  --   gait belt  -AC     Functional Mobility-Distance (Feet)  60  -AC     Recorded by  [AC] Julia Rodriguez OT     ADL Assessment/Intervention    Additional Documentation  Self-Feeding Assessment/Training (Group)  -AC     Recorded by  [AC] Julia MELVIN  Michael OT     Lower Body Dressing Assessment/Training    LB Dressing Assess/Train, Clothing Type  doffing:;donning:  -AC     LB Dressing Assess/Train, Position  sitting  -AC     LB Dressing Assess/Train, [place]  moderate assist (50% patient effort)  -     LB Dressing Assess/Train, Comment  used left hand and cross leg technique;  required assist to initially place over toes  -AC     Recorded by  [AC] Julia Rodriguez OT     Self-Feeding Assessment/Training    Self-Feeding Assess/Train, Comment  pt with tremoring, but able to use left hand to drink from cup with lid without spills   -AC     Recorded by  [AC] Julia Rodriguez OT     Balance Skills Training    Standing-Level of Assistance  Contact guard  -AC     Static Standing Balance Support  --   gait belt  -AC     Gait Balance-Level of Assistance  Contact guard  -AC     Gait Balance Support  --   gait belt  -AC     Recorded by  [AC] Julia Rodriguez OT     Therapy Exercises    Bilateral Lower Extremities --   deferred due to fatigue  -  AROM:;10 reps;sitting  -UD    Right Upper Extremity  AROM:;10 reps;elbow flexion/extension;hand pumps;pronation/supination   wrist f/e  -AC     Left Upper Extremity  10 reps;elbow flexion/extension;shoulder extension/flexion;shoulder horizontal abd/add  -AC     Recorded by [LM] Mary Smith, PT [AC] Julia Rodriguez, OT [UD] Loli León, KORINA    Positioning and Restraints    Pre-Treatment Position sitting in chair/recliner  -LM sitting in chair/recliner  -AC sitting in chair/recliner  -UD    Post Treatment Position bed  - chair  -AC chair  -UD    In Bed notified nsg;supine;call light within reach;encouraged to call for assist;exit alarm on;RUE elevated   with sling  -LM      In Chair  notified nsg;reclined;call light within reach;encouraged to call for assist;exit alarm on  -AC notified nsg;reclined;sitting;call light within reach;exit alarm on;legs elevated  -UD    Recorded by [LM] Mary Smith, PT [AC] Julia Rodriguez, OT  [UD] Loli León, PTA      User Key  (r) = Recorded By, (t) = Taken By, (c) = Cosigned By    Initials Name Effective Dates    AC Julia Rodriguez, OT 06/23/15 -     UD Loli León, PTA 06/22/15 -     LM Mary Smith, PT 06/09/17 -                 IP PT Goals       02/03/18 1523 02/01/18 1142 01/30/18 1308    Bed Mobility PT LTG    Bed Mobility PT LTG, Date Goal Reviewed 02/03/18  -LM      Bed Mobility PT LTG, Outcome goal ongoing  -LM goal ongoing  -UD goal ongoing  -KR    Transfer Training PT LTG    Transfer Training PT  LTG, Date Goal Reviewed 02/03/18  -LM      Transfer Training PT LTG, Outcome goal ongoing  -LM goal ongoing  -UD goal ongoing  -KR    Gait Training PT LTG    Gait Training Goal PT LTG, Date Goal Reviewed 02/03/18  -LM      Gait Training Goal PT LTG, Outcome goal ongoing  -LM goal ongoing  -UD goal ongoing  -KR      01/29/18 0931 01/28/18 1159       Bed Mobility PT LTG    Bed Mobility PT LTG, Time to Achieve  4 days  -CT     Bed Mobility PT LTG, Activity Type  all bed mobility  -CT     Bed Mobility PT LTG, [place] Level  independent  -CT     Bed Mobility PT LTG, Date Goal Reviewed 01/29/18  -AS      Bed Mobility PT LTG, Outcome goal ongoing  -AS      Transfer Training PT LTG    Transfer Training PT LTG, Time to Achieve  4 days  -CT     Transfer Training PT LTG, [place] Level  independent  -CT     Transfer Training PT  LTG, Date Goal Reviewed 01/29/18  -AS      Transfer Training PT LTG, Outcome goal ongoing  -AS      Gait Training PT LTG    Gait Training Goal PT LTG, Date Established  01/28/18  -CT     Gait Training Goal PT LTG, [place] Level  conditional independence;supervision required  -CT     Gait Training Goal PT LTG, Assist Device  other (see comments)  -CT     Gait Training Goal PT LTG, Distance to Achieve  hha to 500  -CT     Gait Training Goal PT LTG, Date Goal Reviewed 01/29/18  -AS      Gait Training Goal PT LTG, Outcome goal ongoing  -AS        User Key  (r) = Recorded  By, (t) = Taken By, (c) = Cosigned By    Initials Name Provider Type    UD Loli León, PTA Physical Therapy Assistant    AS Katerine Laguna, PTA Physical Therapy Assistant    CT Chris Che, PT Physical Therapist    MANJINDER Aly, PT Physical Therapist    GABBY Smith, PT Physical Therapist          Physical Therapy Education     Title: PT OT SLP Therapies (Active)     Topic: Physical Therapy (Active)     Point: Mobility training (Active)    Learning Progress Summary    Learner Readiness Method Response Comment Documented by Status   Patient Acceptance E,D NR Reviewed benefits of activity, WB status, correct gait mechanics, safety with mobility.  02/03/18 1523 Active    Eager ED DU,NR  UD 02/01/18 1142 Done    Acceptance E VU  KS 02/01/18 0934 Done    Acceptance E NR  KR 01/30/18 1308 Active    Acceptance E NR  AS 01/29/18 0931 Active    Acceptance E NR  CT 01/28/18 1201 Active               Point: Home exercise program (Active)    Learning Progress Summary    Learner Readiness Method Response Comment Documented by Status   Patient Acceptance E,D NR Reviewed benefits of activity, WB status, correct gait mechanics, safety with mobility.  02/03/18 1523 Active    Eager ED DU,NR  UD 02/01/18 1142 Done    Acceptance E VU  KS 02/01/18 0934 Done    Acceptance E NR  AS 01/29/18 0931 Active    Acceptance E NR  CT 01/28/18 1201 Active               Point: Body mechanics (Active)    Learning Progress Summary    Learner Readiness Method Response Comment Documented by Status   Patient Acceptance E,D NR Reviewed benefits of activity, WB status, correct gait mechanics, safety with mobility.  02/03/18 1523 Active    Eager ED DU,NR  UD 02/01/18 1142 Done    Acceptance E VU  KS 02/01/18 0934 Done    Acceptance E NR  KR 01/30/18 1308 Active    Acceptance E NR  AS 01/29/18 0931 Active    Acceptance E NR  CT 01/28/18 1201 Active               Point: Precautions (Active)    Learning Progress Summary     Learner Readiness Method Response Comment Documented by Status   Patient Acceptance E,D NR Reviewed benefits of activity, WB status, correct gait mechanics, safety with mobility. LM 02/03/18 1523 Active    Eager JAN EASLEY,NR  UD 02/01/18 1142 Done    Acceptance E VU  KS 02/01/18 0934 Done    Acceptance E NR  KR 01/30/18 1308 Active    Acceptance E NR  AS 01/29/18 0931 Active    Acceptance E NR  CT 01/28/18 1201 Active                      User Key     Initials Effective Dates Name Provider Type Discipline    UD 06/22/15 -  Loli León, PTA Physical Therapy Assistant PT    AS 06/22/15 -  Katerine Laguna, PTA Physical Therapy Assistant PT    CT 06/19/15 -  Chris Che, PT Physical Therapist PT    KS 09/18/16 -  Jeni Fletcher RN Registered Nurse Nurse    KR 09/25/17 -  Ava Aly, PT Physical Therapist PT    LM 06/09/17 -  Mary Smith, PT Physical Therapist PT                    PT Recommendation and Plan  Planned Therapy Interventions: balance training, bed mobility training, gait training, home exercise program, strengthening  Plan of Care Review  Plan Of Care Reviewed With: patient  Progress: progress toward functional goals as expected  Outcome Summary/Follow up Plan: Pt ambulated 300 feet with [place] and left UE support. Pt required cues throughout to slow pace for safety. Pt demo SOA after ambulation and desat to 86%, able to recover with PLB and rest. Pt continues to require maxA for bed mobility. Will progress with mobility as able.            Outcome Measures       02/03/18 1438 02/02/18 1303 02/01/18 1055    How much help from another person do you currently need...    Turning from your back to your side while in flat bed without using bedrails? 2  -LM  2  -UD    Moving from lying on back to sitting on the side of a flat bed without bedrails? 2  -LM  2  -UD    Moving to and from a bed to a chair (including a wheelchair)? 3  -LM  3  -UD    Standing up from a chair using your arms (e.g.,  wheelchair, bedside chair)? 3  -LM  3  -UD    Climbing 3-5 steps with a railing? 2  -LM  2  -UD    To walk in hospital room? 3  -LM  3  -UD    AM-PAC 6 Clicks Score 15  -LM  15  -UD    How much help from another is currently needed...    Putting on and taking off regular lower body clothing?  2  -AC     Bathing (including washing, rinsing, and drying)  2  -AC     Toileting (which includes using toilet bed pan or urinal)  2  -AC     Putting on and taking off regular upper body clothing  2  -AC     Taking care of personal grooming (such as brushing teeth)  2  -AC     Eating meals  3  -AC     Score  13  -AC     Functional Assessment    Outcome Measure Options AM-PAC 6 Clicks Basic Mobility (PT)  -LM AM-PAC 6 Clicks Daily Activity (OT)  -AC       User Key  (r) = Recorded By, (t) = Taken By, (c) = Cosigned By    Initials Name Provider Type    AC Julia Rodriguez, OT Occupational Therapist    UD Loli León, PTA Physical Therapy Assistant    GABBY Smith, PT Physical Therapist           Time Calculation:         PT Charges       02/03/18 1526          Time Calculation    Start Time 1438  -LM      PT Received On 02/03/18  -LM      PT Goal Re-Cert Due Date 02/07/18  -LM      Time Calculation- PT    Total Timed Code Minutes- PT 25 minute(s)  -LM        User Key  (r) = Recorded By, (t) = Taken By, (c) = Cosigned By    Initials Name Provider Type    GABBY Smith, PT Physical Therapist          Therapy Charges for Today     Code Description Service Date Service Provider Modifiers Qty    62131396988 HC GAIT TRAINING EA 15 MIN 2/3/2018 Mary Smith, PT GP 1    47666910698 HC PT THER PROC EA 15 MIN 2/3/2018 Mary Smith PT GP 1          PT G-Codes  PT Professional Judgement Used?: Yes  Outcome Measure Options: AM-PAC 6 Clicks Basic Mobility (PT)  Score: 15  Functional Limitation: Mobility: Walking and moving around  Mobility: Walking and Moving Around Current Status (): At least 40 percent but less than 60  percent impaired, limited or restricted  Mobility: Walking and Moving Around Goal Status (): At least 20 percent but less than 40 percent impaired, limited or restricted    Mary Smith, PT  2/3/2018

## 2018-02-03 NOTE — PLAN OF CARE
Problem: Patient Care Overview (Adult)  Goal: Plan of Care Review  Outcome: Ongoing (interventions implemented as appropriate)   02/03/18 0416   Coping/Psychosocial Response Interventions   Plan Of Care Reviewed With patient   Patient Care Overview   Progress progress toward functional goals as expected   Outcome Evaluation   Outcome Summary/Follow up Plan Patient ambulates frequently with assistance. Pain in right arm improved with use of sling.        Problem: Fall Risk (Adult)  Goal: Absence of Falls  Outcome: Ongoing (interventions implemented as appropriate)

## 2018-02-03 NOTE — PLAN OF CARE
Problem: Patient Care Overview (Adult)  Goal: Plan of Care Review  Outcome: Ongoing (interventions implemented as appropriate)   02/03/18 9355   Coping/Psychosocial Response Interventions   Plan Of Care Reviewed With patient;daughter;spouse   Patient Care Overview   Progress improving   Outcome Evaluation   Outcome Summary/Follow up Plan Obtunded. Does not open eyes. No verbal. Intermittent spontaneous movement left extremities. Right arm flaccid. Right leg withdraws to pain.  Respirations unlabored 2L cannula.  Oral suctioned intermittent.  SR monitor.  Incontinent bowel and bladder. PICC infusing. Keofeed infusing. Q2turn with no new skin issues.  Family bedside partial shift.

## 2018-02-03 NOTE — PROGRESS NOTES
[facility] Medicine Services  PROGRESS NOTE    Patient Name: Liane Griffin  : 1950  MRN: [id number]    Date of Admission: 2018  Length of Stay: 0  Primary Care Physician: No Known Provider    Subjective   Subjective   CC: AMS    HPI:  Sitting up in a chair eating lunch with her left hand in NAD.  Patient states she slept pretty well overnight, has not had a BM, but she is working on it.  No adverse events overnight.  No family in room.    Review of Systems  Gen- No fevers, chills  CV- No chest pain, palpitations  Resp- No cough, dyspnea  GI- No N/V/D, abd pain  MS-right upper arm pain  Otherwise ROS is negative except as mentioned in the HPI.    Objective   Objective   Vital Signs:   Temp:  [98 °F (36.7 °C)-98.5 °F (36.9 °C)] 98 °F (36.7 °C)  Heart Rate:  [59-77] 64  Resp:  [18-20] 20  BP: (113-141)/(66-81) 113/69  Physical Exam:  General: Alert, well-developed well-nourished morbidly obese female in no acute distress    Head: Normocephalic atraumatic    Eyes: PERRLA, EOMI, nonicteric, conjunctiva normal    ENT: Pink, moist mucous membranes    Neck: Supple, nontender, trachea midline without lymphadenopathy, JVD, nuchal rigidity.      Cardiovascular: RRR  no M/R/G  +1 DP pulses bilaterally    Respiratory: Nonlabored, symmetrical chest expansion, clear to auscultation bilaterally    Abdomen: Morbidly obese, soft, nontender, nondistended,  positive bowel sounds in all 4 quadrants     Extremities: FROM in upper and lower extremities bilaterally except for right arm which is in a sling.  negative for edema/cyanosis. Negative calf pain.    Skin: Pink/warm/dry.  No rash or lesions noted    Neuro: Alert and oriented to person place time and situation, speech is clear, follows all commands, recent and remote memory intact    Psych: Patient is pleasant and cooperative.  Normal affect.  Negative suicidal ideation or homicidal ideation.    Results Reviewed:  I have personally reviewed  current lab, radiology, and data and agree.    Results from last 7 days  Lab Units 01/28/18  0629   WBC 10*3/mm3 7.47   HEMOGLOBIN g/dL 8.7*   HEMATOCRIT % 28.0*   PLATELETS 10*3/mm3 382       Results from last 7 days  Lab Units 01/28/18  0629   SODIUM mmol/L 141   POTASSIUM mmol/L 3.7   CHLORIDE mmol/L 101   CO2 mmol/L 30.0   BUN mg/dL 17   CREATININE mg/dL 0.70   GLUCOSE mg/dL 112*   CALCIUM mg/dL 8.8     Estimated Creatinine Clearance: 68.5 mL/min (by C-G formula based on Cr of 0.7).  No results found for: BNP  No results found for: PHART    Microbiology Results Abnormal     Procedure Component Value - Date/Time    Blood Culture - Blood, [[id number]]  (Normal) Collected:  01/27/18 1255    Lab Status:  Final result Specimen:  Blood from Arm, Right Updated:  02/01/18 1316     Blood Culture No growth at 5 days    Blood Culture - Blood, [[id number]]  (Normal) Collected:  01/27/18 1255    Lab Status:  Final result Specimen:  Blood from Arm, Left Updated:  02/01/18 1316     Blood Culture No growth at 5 days    Influenza A & B, RT PCR - Swab, Nasopharynx [[id number]]  (Normal) Collected:  01/28/18 0542    Lab Status:  Final result Specimen:  Swab from Nasopharynx Updated:  01/28/18 1101     Influenza A PCR Not Detected     Influenza B PCR Not Detected    Influenza Antigen, Rapid - Swab, Nasopharynx [[id number]]  (Normal) Collected:  01/27/18 1305    Lab Status:  Final result Specimen:  Swab from Nasopharynx Updated:  01/27/18 1332     Influenza A Ag, EIA Negative     Influenza B Ag, EIA Negative        Xray reviewed with humeral fracture. Agree with interpretation.  Imaging Results (last 24 hours)     ** No results found for the last 24 hours. **        I have reviewed the medications.    Assessment/Plan   Assessment / Plan     Hospital Problem List     Immobility    CAD (coronary artery disease) (Chronic)    Diabetes mellitus, type II (Chronic)    HTN (hypertension) (Chronic)    HLD (hyperlipidemia) (Chronic)    Schizophrenia  (Chronic)    Anemia    Tremor        Brief Hospital Course to date:  Liane Griffin is a 67 y.o. female who was discharged to her half-way from [facility] after a humeral fracture admitted to our facility because her DONATO felt she needed more help.     Assessment & Plan:  --Continue nonoperative mgmt and pain control. Non weight bearing to RUE. Ortho following  --Unclear of her baseline mental status given her schizophrenia she continues to remain intermittently altered. Continue psych meds.  --Anemia studies c/w ERICA. On PO iron.  --CM following, difficult placement, awaiting medicaid pending bed, several facilities already denying her  --PRN pain meds  --Continue sling on RUE    DVT Prophylaxis:  [facility]    CODE STATUS: Full Code    Disposition: I expect the patient to be discharged when bed available, CM working on difficult placement

## 2018-02-04 LAB
GLUCOSE BLDC GLUCOMTR-MCNC: 111 MG/DL (ref 70–130)
GLUCOSE BLDC GLUCOMTR-MCNC: 112 MG/DL (ref 70–130)
GLUCOSE BLDC GLUCOMTR-MCNC: 130 MG/DL (ref 70–130)
GLUCOSE BLDC GLUCOMTR-MCNC: 138 MG/DL (ref 70–130)

## 2018-02-04 PROCEDURE — 25010000002 HEPARIN (PORCINE) PER 1000 UNITS: Performed by: NURSE PRACTITIONER

## 2018-02-04 PROCEDURE — 82962 GLUCOSE BLOOD TEST: CPT

## 2018-02-04 PROCEDURE — 96372 THER/PROPH/DIAG INJ SC/IM: CPT

## 2018-02-04 PROCEDURE — 99225 PR SBSQ OBSERVATION CARE/DAY 25 MINUTES: CPT | Performed by: INTERNAL MEDICINE

## 2018-02-04 PROCEDURE — 94799 UNLISTED PULMONARY SVC/PX: CPT

## 2018-02-04 PROCEDURE — G0378 HOSPITAL OBSERVATION PER HR: HCPCS

## 2018-02-04 RX ADMIN — GABAPENTIN 400 MG: 400 CAPSULE ORAL at 20:32

## 2018-02-04 RX ADMIN — FLUTICASONE PROPIONATE 2 SPRAY: 50 SPRAY, METERED NASAL at 08:10

## 2018-02-04 RX ADMIN — HYDROCODONE BITARTRATE AND ACETAMINOPHEN 1 TABLET: 7.5; 325 TABLET ORAL at 16:45

## 2018-02-04 RX ADMIN — OXYCODONE HYDROCHLORIDE AND ACETAMINOPHEN 500 MG: 500 TABLET ORAL at 08:09

## 2018-02-04 RX ADMIN — HYDROCODONE BITARTRATE AND ACETAMINOPHEN 1 TABLET: 7.5; 325 TABLET ORAL at 20:31

## 2018-02-04 RX ADMIN — CETIRIZINE HYDROCHLORIDE 10 MG: 10 TABLET, FILM COATED ORAL at 20:31

## 2018-02-04 RX ADMIN — GABAPENTIN 400 MG: 400 CAPSULE ORAL at 08:09

## 2018-02-04 RX ADMIN — BUSPIRONE HYDROCHLORIDE 5 MG: 10 TABLET ORAL at 08:09

## 2018-02-04 RX ADMIN — ASPIRIN 81 MG: 81 TABLET, COATED ORAL at 08:09

## 2018-02-04 RX ADMIN — Medication 325 MG: at 08:10

## 2018-02-04 RX ADMIN — HEPARIN SODIUM 5000 UNITS: 5000 INJECTION, SOLUTION INTRAVENOUS; SUBCUTANEOUS at 20:31

## 2018-02-04 RX ADMIN — FLUOXETINE 10 MG: 10 CAPSULE ORAL at 08:09

## 2018-02-04 RX ADMIN — BISACODYL 5 MG: 5 TABLET, COATED ORAL at 08:10

## 2018-02-04 RX ADMIN — PANTOPRAZOLE SODIUM 40 MG: 40 TABLET, DELAYED RELEASE ORAL at 05:58

## 2018-02-04 RX ADMIN — BENZTROPINE MESYLATE 0.5 MG: 1 TABLET ORAL at 16:45

## 2018-02-04 RX ADMIN — TRAMADOL HYDROCHLORIDE 50 MG: 50 TABLET, COATED ORAL at 08:09

## 2018-02-04 RX ADMIN — PERPHENAZINE 4 MG: 2 TABLET, FILM COATED ORAL at 08:09

## 2018-02-04 RX ADMIN — ATORVASTATIN CALCIUM 40 MG: 40 TABLET, FILM COATED ORAL at 20:32

## 2018-02-04 RX ADMIN — DOCUSATE SODIUM 100 MG: 100 CAPSULE, LIQUID FILLED ORAL at 08:10

## 2018-02-04 RX ADMIN — POLYETHYLENE GLYCOL 3350 17 G: 17 POWDER, FOR SOLUTION ORAL at 08:10

## 2018-02-04 RX ADMIN — BUSPIRONE HYDROCHLORIDE 5 MG: 10 TABLET ORAL at 20:32

## 2018-02-04 RX ADMIN — AMLODIPINE BESYLATE 10 MG: 10 TABLET ORAL at 08:09

## 2018-02-04 RX ADMIN — CLOPIDOGREL BISULFATE 75 MG: 75 TABLET ORAL at 08:09

## 2018-02-04 RX ADMIN — OLANZAPINE 10 MG: 5 TABLET, FILM COATED ORAL at 20:32

## 2018-02-04 RX ADMIN — HEPARIN SODIUM 5000 UNITS: 5000 INJECTION, SOLUTION INTRAVENOUS; SUBCUTANEOUS at 08:10

## 2018-02-04 RX ADMIN — TRAZODONE HYDROCHLORIDE 50 MG: 50 TABLET ORAL at 20:31

## 2018-02-04 RX ADMIN — HYDROCODONE BITARTRATE AND ACETAMINOPHEN 1 TABLET: 7.5; 325 TABLET ORAL at 11:18

## 2018-02-04 RX ADMIN — LIDOCAINE 1 PATCH: 50 PATCH CUTANEOUS at 08:10

## 2018-02-04 RX ADMIN — HYDROCODONE BITARTRATE AND ACETAMINOPHEN 1 TABLET: 7.5; 325 TABLET ORAL at 03:43

## 2018-02-04 NOTE — PLAN OF CARE
Problem: Patient Care Overview (Adult)  Goal: Plan of Care Review  Outcome: Ongoing (interventions implemented as appropriate)   02/03/18 1900   Coping/Psychosocial Response Interventions   Plan Of Care Reviewed With patient   Patient Care Overview   Progress no change   Outcome Evaluation   Outcome Summary/Follow up Plan Alert and oriented. Respirations unlabored. SR monitor. Ongoing issue with pain. Worse this afternoon after physical therapy.  Medicated pain with prn meds.  Neck more upright today.  Up in recliner. Ambulated in avalos and to bathroom. Sling in place.  Medicated constipation without success.  No visitors this shift.        Problem: Fall Risk (Adult)  Goal: Identify Related Risk Factors and Signs and Symptoms  Outcome: Ongoing (interventions implemented as appropriate)      Problem: Orthopaedic Fracture (Adult)  Goal: Signs and Symptoms of Listed Potential Problems Will be Absent or Manageable (Orthopaedic Fracture)  Outcome: Ongoing (interventions implemented as appropriate)

## 2018-02-04 NOTE — PLAN OF CARE
Problem: Patient Care Overview (Adult)  Goal: Plan of Care Review  Outcome: Ongoing (interventions implemented as appropriate)   02/04/18 0208   Coping/Psychosocial Response Interventions   Plan Of Care Reviewed With patient   Patient Care Overview   Progress no change   Outcome Evaluation   Outcome Summary/Follow up Plan alert and oriented, anxious, states she's lonely, frequent pain control, rested well, voiding well, worried about not having a bowel movement in over a week., vss, will continue to monitor     Goal: Adult Individualization and Mutuality  Outcome: Ongoing (interventions implemented as appropriate)    Goal: Discharge Needs Assessment  Outcome: Ongoing (interventions implemented as appropriate)      Problem: Fall Risk (Adult)  Goal: Identify Related Risk Factors and Signs and Symptoms  Outcome: Ongoing (interventions implemented as appropriate)    Goal: Absence of Falls  Outcome: Ongoing (interventions implemented as appropriate)      Problem: Orthopaedic Fracture (Adult)  Goal: Signs and Symptoms of Listed Potential Problems Will be Absent or Manageable (Orthopaedic Fracture)  Outcome: Ongoing (interventions implemented as appropriate)

## 2018-02-04 NOTE — PROGRESS NOTES
[facility] Medicine Services  PROGRESS NOTE    Patient Name: Liane Griffin  : 1950  MRN: [id number]    Date of Admission: 2018  Length of Stay: 0  Primary Care Physician: No Known Provider    Subjective   Subjective     CC: AMS    HPI:  Pt sitting up in chair, no issues overnight    Review of Systems  Gen- No fevers, chills  CV- No chest pain, palpitations  Resp- No cough, dyspnea  GI- No N/V/D, abd pain    Otherwise ROS is negative except as mentioned in the HPI.    Objective   Objective     Vital Signs:   Temp:  [97.9 °F (36.6 °C)-98.3 °F (36.8 °C)] 98.3 °F (36.8 °C)  Heart Rate:  [58-67] 64  Resp:  [12-18] 16  BP: (115-129)/(59-69) 127/67        Physical Exam:  Constitutional: No acute distress, awake  HENT: NCAT, mucous membranes moist  Respiratory: Clear to auscultation bilaterally, respiratory effort normal   Cardiovascular: RRR, no murmurs, rubs, or gallops, palpable pedal pulses bilaterally  Gastrointestinal: Positive bowel sounds, soft, nontender, nondistended  Musculoskeletal: Right arm with mild edema, immobilized, bruising noted over anterior shoulder  Psychiatric: Appropriate affect, cooperative  Neurologic: Alert and interactive, disoriented, strength symmetric in all extremities, Cranial Nerves grossly intact to confrontation, speech clear  Skin: No rashes    Results Reviewed:  I have personally reviewed current lab, radiology, and data and agree.              Invalid input(s):  ALKPHOS, TROPONININT  Estimated Creatinine Clearance: 68.5 mL/min (by C-G formula based on Cr of 0.7).  No results found for: BNP  No results found for: PHART    Microbiology Results Abnormal     Procedure Component Value - Date/Time    Blood Culture - Blood, [[id number]]  (Normal) Collected:  18 1255    Lab Status:  Final result Specimen:  Blood from Arm, Right Updated:  18 1316     Blood Culture No growth at 5 days    Blood Culture - Blood, [[id number]]  (Normal) Collected:   01/27/18 1255    Lab Status:  Final result Specimen:  Blood from Arm, Left Updated:  02/01/18 1316     Blood Culture No growth at 5 days    Influenza A & B, RT PCR - Swab, Nasopharynx [[id number]]  (Normal) Collected:  01/28/18 0542    Lab Status:  Final result Specimen:  Swab from Nasopharynx Updated:  01/28/18 1101     Influenza A PCR Not Detected     Influenza B PCR Not Detected    Influenza Antigen, Rapid - Swab, Nasopharynx [[id number]]  (Normal) Collected:  01/27/18 1305    Lab Status:  Final result Specimen:  Swab from Nasopharynx Updated:  01/27/18 1332     Influenza A Ag, EIA Negative     Influenza B Ag, EIA Negative        Xray reviewed with humeral fracture. Agree with interpretation.  Imaging Results (last 24 hours)     ** No results found for the last 24 hours. **             I have reviewed the medications.    Assessment/Plan   Assessment / Plan     Hospital Problem List     Immobility    CAD (coronary artery disease) (Chronic)    Diabetes mellitus, type II (Chronic)    HTN (hypertension) (Chronic)    HLD (hyperlipidemia) (Chronic)    Schizophrenia (Chronic)    Anemia    Tremor           Brief Hospital Course to date:  Liane Griffin is a 67 y.o. female who was discharged to her shelter from [facility] after a humeral fracture admitted to our facility because her DONATO felt she needed more help.     Assessment & Plan:  --Continue nonoperative mgmt and pain control. Non weight bearing to RUE. Ortho following  --Unclear of her baseline mental status given her schizophrenia she continues to remain intermittently altered. Continue psych meds.  --Anemia studies c/w ERICA. On PO iron.  --CM following, difficult placement, awaiting medicaid pending bed, several facilities already denying her  --PRN pain meds    DVT Prophylaxis:  [facility]    CODE STATUS: Full Code    Disposition: I expect the patient to be discharged when bed available, CM working on difficult placement

## 2018-02-04 NOTE — PLAN OF CARE
Problem: Patient Care Overview (Adult)  Goal: Plan of Care Review  Outcome: Ongoing (interventions implemented as appropriate)   02/04/18 3089   Coping/Psychosocial Response Interventions   Plan Of Care Reviewed With patient   Patient Care Overview   Progress improving   Outcome Evaluation   Outcome Summary/Follow up Plan Alert and oriented. Respirations unlabored. SR monitor. Medicated pain three times this shift.  Encouraged neutral neck alignment.  Up in recliner with meals.  Ambulated to bathroom one assist.  Sling in place.  Continued prn medication and prune juice for constipation without success. No visitors this shift.        Problem: Fall Risk (Adult)  Goal: Identify Related Risk Factors and Signs and Symptoms  Outcome: Ongoing (interventions implemented as appropriate)    Goal: Absence of Falls  Outcome: Ongoing (interventions implemented as appropriate)      Problem: Orthopaedic Fracture (Adult)  Goal: Signs and Symptoms of Listed Potential Problems Will be Absent or Manageable (Orthopaedic Fracture)  Outcome: Ongoing (interventions implemented as appropriate)

## 2018-02-05 LAB — GLUCOSE BLDC GLUCOMTR-MCNC: 91 MG/DL (ref 70–130)

## 2018-02-05 PROCEDURE — G0378 HOSPITAL OBSERVATION PER HR: HCPCS

## 2018-02-05 PROCEDURE — 97116 GAIT TRAINING THERAPY: CPT

## 2018-02-05 PROCEDURE — 97530 THERAPEUTIC ACTIVITIES: CPT

## 2018-02-05 PROCEDURE — 82962 GLUCOSE BLOOD TEST: CPT

## 2018-02-05 PROCEDURE — 97110 THERAPEUTIC EXERCISES: CPT

## 2018-02-05 PROCEDURE — 96372 THER/PROPH/DIAG INJ SC/IM: CPT

## 2018-02-05 PROCEDURE — 94799 UNLISTED PULMONARY SVC/PX: CPT

## 2018-02-05 PROCEDURE — 99225 PR SBSQ OBSERVATION CARE/DAY 25 MINUTES: CPT | Performed by: PHYSICIAN ASSISTANT

## 2018-02-05 PROCEDURE — 25010000002 HEPARIN (PORCINE) PER 1000 UNITS: Performed by: NURSE PRACTITIONER

## 2018-02-05 RX ORDER — BISACODYL 10 MG
10 SUPPOSITORY, RECTAL RECTAL DAILY PRN
Status: DISCONTINUED | OUTPATIENT
Start: 2018-02-05 | End: 2018-02-09 | Stop reason: HOSPADM

## 2018-02-05 RX ORDER — LACTULOSE 10 G/15ML
20 SOLUTION ORAL 3 TIMES DAILY PRN
Status: DISCONTINUED | OUTPATIENT
Start: 2018-02-05 | End: 2018-02-09 | Stop reason: HOSPADM

## 2018-02-05 RX ADMIN — DOCUSATE SODIUM 100 MG: 100 CAPSULE, LIQUID FILLED ORAL at 08:29

## 2018-02-05 RX ADMIN — HYDROCODONE BITARTRATE AND ACETAMINOPHEN 1 TABLET: 7.5; 325 TABLET ORAL at 03:21

## 2018-02-05 RX ADMIN — HYDROCODONE BITARTRATE AND ACETAMINOPHEN 1 TABLET: 7.5; 325 TABLET ORAL at 08:28

## 2018-02-05 RX ADMIN — BUSPIRONE HYDROCHLORIDE 5 MG: 10 TABLET ORAL at 21:38

## 2018-02-05 RX ADMIN — LACTULOSE 20 G: 10 SOLUTION ORAL at 14:03

## 2018-02-05 RX ADMIN — GABAPENTIN 400 MG: 400 CAPSULE ORAL at 21:37

## 2018-02-05 RX ADMIN — LIDOCAINE 1 PATCH: 50 PATCH CUTANEOUS at 08:29

## 2018-02-05 RX ADMIN — Medication 325 MG: at 08:29

## 2018-02-05 RX ADMIN — HYDROCODONE BITARTRATE AND ACETAMINOPHEN 1 TABLET: 7.5; 325 TABLET ORAL at 15:21

## 2018-02-05 RX ADMIN — CLONAZEPAM 0.5 MG: 0.5 TABLET ORAL at 23:59

## 2018-02-05 RX ADMIN — LACTULOSE 20 G: 10 SOLUTION ORAL at 21:38

## 2018-02-05 RX ADMIN — FLUTICASONE PROPIONATE 2 SPRAY: 50 SPRAY, METERED NASAL at 08:29

## 2018-02-05 RX ADMIN — GABAPENTIN 400 MG: 400 CAPSULE ORAL at 08:29

## 2018-02-05 RX ADMIN — OLANZAPINE 10 MG: 5 TABLET, FILM COATED ORAL at 21:38

## 2018-02-05 RX ADMIN — HEPARIN SODIUM 5000 UNITS: 5000 INJECTION, SOLUTION INTRAVENOUS; SUBCUTANEOUS at 08:29

## 2018-02-05 RX ADMIN — BENZTROPINE MESYLATE 0.5 MG: 1 TABLET ORAL at 16:54

## 2018-02-05 RX ADMIN — CLOPIDOGREL BISULFATE 75 MG: 75 TABLET ORAL at 08:28

## 2018-02-05 RX ADMIN — ASPIRIN 81 MG: 81 TABLET, COATED ORAL at 08:28

## 2018-02-05 RX ADMIN — HEPARIN SODIUM 5000 UNITS: 5000 INJECTION, SOLUTION INTRAVENOUS; SUBCUTANEOUS at 21:38

## 2018-02-05 RX ADMIN — CETIRIZINE HYDROCHLORIDE 10 MG: 10 TABLET, FILM COATED ORAL at 21:38

## 2018-02-05 RX ADMIN — PANTOPRAZOLE SODIUM 40 MG: 40 TABLET, DELAYED RELEASE ORAL at 05:09

## 2018-02-05 RX ADMIN — HYDROCODONE BITARTRATE AND ACETAMINOPHEN 1 TABLET: 7.5; 325 TABLET ORAL at 21:38

## 2018-02-05 RX ADMIN — FLUOXETINE 10 MG: 10 CAPSULE ORAL at 08:28

## 2018-02-05 RX ADMIN — ATORVASTATIN CALCIUM 40 MG: 40 TABLET, FILM COATED ORAL at 21:37

## 2018-02-05 RX ADMIN — PERPHENAZINE 4 MG: 2 TABLET, FILM COATED ORAL at 08:29

## 2018-02-05 RX ADMIN — AMLODIPINE BESYLATE 10 MG: 10 TABLET ORAL at 08:28

## 2018-02-05 RX ADMIN — OXYCODONE HYDROCHLORIDE AND ACETAMINOPHEN 500 MG: 500 TABLET ORAL at 08:28

## 2018-02-05 RX ADMIN — BUSPIRONE HYDROCHLORIDE 5 MG: 10 TABLET ORAL at 08:28

## 2018-02-05 RX ADMIN — POLYETHYLENE GLYCOL 3350 17 G: 17 POWDER, FOR SOLUTION ORAL at 08:28

## 2018-02-05 RX ADMIN — TRAZODONE HYDROCHLORIDE 50 MG: 50 TABLET ORAL at 21:38

## 2018-02-05 RX ADMIN — BISACODYL 5 MG: 5 TABLET, COATED ORAL at 08:28

## 2018-02-05 NOTE — THERAPY TREATMENT NOTE
Acute Care - Occupational Therapy Treatment Note  [facility]     Patient Name: Liane Griffin  : 1950  MRN: [id number]  Today's Date: 2018  Onset of Illness/Injury or Date of Surgery Date: 18  Date of Referral to OT: 18  Referring Physician: JOE Mckeon      Admit Date: 2018    Visit Dx:     ICD-10-CM ICD-9-CM   1. Torticollis, acute M43.6 723.5   2. 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 Physical deconditioning R53.81 799.3   4. Impaired mobility and ADLs Z74.09 799.89   5. Impaired functional mobility, balance, gait, and endurance Z74.09 V49.89     Patient Active Problem List   Diagnosis   • Immobility   • CAD (coronary artery disease)   • Diabetes mellitus, type II   • HTN (hypertension)   • HLD (hyperlipidemia)   • Schizophrenia   • Anemia   • Tremor             Adult Rehabilitation Note       18 0917 18 0854 18 1438    Rehab Assessment/Intervention    Discipline occupational therapist  -SHARON physical therapy assistant  -AS physical therapist  -LM    Document Type therapy note (daily note)  -SHARON therapy note (daily note)  -AS therapy note (daily note)  -LM    Subjective Information agree to therapy;complains of;pain  -SHARON agree to therapy;complains of;pain  -AS agree to therapy;complains of;pain  -LM    Patient Effort, Rehab Treatment good  -SHARON good  -AS good  -LM    Symptoms Noted During/After Treatment none  -SHARON increased pain  -AS fatigue;shortness of breath  -LM    Symptoms Noted Comment   SOA following ambulation, O2 sats monitored. RN notified  -LM    Precautions/Limitations fall precautions   sling for comfort, RUE NWB,   -SHARON fall precautions;other (see comments)   sling for comfort, NWB R UE  -AS fall precautions;non-weight bearing status   NWB RUE, RUE in sling, non-op humerus fx  -LM    Specific Treatment Considerations humeral fx RUE, no sh. ROM  -SHARON  tremoring of left UE and  bilateral LEs after ambulation. Pt desat to high 80s after ambulation, required cues for PLB to recover  -LM    Recorded by [SHARON] Myrtle Williamson, OT [AS] Katerine Laguna, KORINA [LM] Mary Smith, PT    Vital Signs    Posttreatment Heart Rate (beats/min) 62  -SHARON      Pre SpO2 (%) --   off other telemetry  -SHARON      O2 Delivery Pre Treatment   room air  -LM    Intra SpO2 (%)   86  -LM    O2 Delivery Intra Treatment   room air  -LM    Post SpO2 (%)   92  -LM    O2 Delivery Post Treatment   supplemental O2   2L; notified RN  -LM    Pre Patient Position Sitting  -SHAORN  Sitting  -LM    Intra Patient Position Sitting  -SHARON  Standing  -LM    Post Patient Position Sitting  -SHARON  Supine  -LM    Recorded by [SHARON] Myrtle Williamson, OT  [LM] Mary Smith, PT    Pain Assessment    Pain Assessment 0-10  -SHARON 0-10  -AS Williamson-Mcallister FACES  -LM    Williamson-Mcallister FACES Pain Rating   4  -LM    Pain Score 8  -SHARON 7  -AS     Post Pain Score 7  -SHARON 7  -AS     Pain Type Acute pain  -SHARON Acute pain  -AS Acute pain  -LM    Pain Location Shoulder   per pt. more specifically in her Right shoulder blade.  -SHARON Shoulder  -AS Arm  -LM    Pain Orientation Right  -SHARON Right  -AS Right  -LM    Patient's Stated Pain Goal  No pain  -AS     Pain Intervention(s) Ambulation/increased activity  -SHARON Repositioned;Ambulation/increased activity  -AS Repositioned;Ambulation/increased activity  -LM    Response to Interventions improved.  -SHARON tolerated  -AS     Recorded by [SHARON] Myrtle Williamson, OT [AS] Katerine Laguna, KORINA [LM] Mary Smith, PT    Cognitive Assessment/Intervention    Current Cognitive/Communication Assessment impaired   slow processing with ther. exer. with max cues needed.  -SHARON functional  -AS functional  -LM    Orientation Status oriented to;person   other not reassessed.  -SHARON oriented to;person;place  -AS oriented x 4  -LM    Follows Commands/Answers Questions 100% of the time;able to follow single-step instructions;needs cueing;needs increased  time;needs repetition;75% of the time  -SHARON 100% of the time;able to follow single-step instructions  -AS 75% of the time;100% of the time;able to follow single-step instructions;needs cueing  -LM    Personal Safety mild impairment  -SHARON mild impairment;decreased awareness, need for assist  -AS mild impairment;decreased awareness, need for assist;decreased awareness, need for safety;decreased insight to deficits  -LM    Personal Safety Interventions fall prevention program maintained;gait belt;nonskid shoes/slippers when out of bed   No R sh ROM or WB  -SHARON fall prevention program maintained;gait belt;nonskid shoes/slippers when out of bed;other (see comments)   exit alarm  -AS     Recorded by [SHARON] Myrtle Williamson, OT [AS] Katerine Laguna PTA [LM] Mary Smith, PT    Bed Mobility, Assessment/Treatment    Bed Mobility, Assistive Device   head of bed elevated  -LM    Bed Mobility, Scoot/Bridge, [place]   maximum assist (25% patient effort);2 person assist required;verbal cues required   scooting upwards in bed  -LM    Bed Mob, Supine to Sit, [place]  verbal cues required;minimum assist (75% patient effort)  -AS not tested   Pt received UIC  -    Bed Mob, Sit to Supine, [place]   maximum assist (25% patient effort);verbal cues required  -LM    Bed Mobility, Safety Issues   decreased use of arms for pushing/pulling;decreased use of legs for bridging/pushing  -    Bed Mobility, Impairments   strength decreased;impaired balance;pain  -LM    Bed Mobility, Comment Pt. up in chair post PT when OT arrived.  -SHARON assist at trunk to reach EOB due to NWB R UE  -AS Verbal cues for sequencing and NWB status. After returning to bed after ambulation, pt demo SOA and O2 monitored. Cues for PLB. Pt exhibited tremoring in left UE and bilateral LEs, reported it was nervousness for moving right UE. O2 sats increased with rest and PLB.  -LM    Recorded by [SHARON] Myrtle Williamson, OT [AS] Katerine Laguna PTA [LM] Mary  SANIA Smith, PT    Transfer Assessment/Treatment    Transfers, Sit-Stand [place]  verbal cues required;minimum assist (75% patient effort)  -AS contact guard assist;verbal cues required  -LM    Transfers, Stand-Sit [place]  verbal cues required;minimum assist (75% patient effort)  -AS contact guard assist;verbal cues required  -LM    Transfers, Sit-Stand-Sit, Assist Device  other (see comments)   B UE support on left  -AS other (see comments)   UE support, gait belt  -LM    Toilet Transfer, [place]   contact guard assist;verbal cues required  -LM    Toilet Transfer, Assistive Device   elevated toilet seat;other (see comments)   grab bar, gait belt  -LM    Transfer, Safety Issues   sequencing ability decreased;step length decreased;weight-shifting ability decreased  -LM    Transfer, Impairments  impaired balance;strength decreased;pain  -AS strength decreased;impaired balance;pain  -LM    Transfer, Comment Pt. declined up again due to recent PT tx   -SHARON patient unsteady on initial stand but given a few seconds patient able to readjust  -AS Verbal cues for correct hand placement and safety.  -LM    Recorded by [SHARON] Myrtle Williamson, OT [AS] Katerine Laguna PTA [LM] Mary Smith, PT    Gait Assessment/Treatment    Gait, [place] Level  verbal cues required;minimum assist (75% patient effort)  -AS minimum assist (75% patient effort);verbal cues required  -LM    Gait, Assistive Device  other (see comments)   B UE on left  -AS other (see comments)   hand-held assist LUE, gait belt  -LM    Gait, Distance (Feet)  200  -  -LM    Gait, Gait Pattern Analysis   swing-through gait  -LM    Gait, Gait Deviations  antalgic;isaiah decreased;forward flexed posture;step length decreased  -AS bilateral:;decreased heel strike;forward flexed posture;narrow base;step length decreased;toe-to-floor clearance decreased;weight-shifting ability decreased  -LM    Gait, Safety Issues  weight-shifting ability  decreased;step length decreased  -AS balance decreased during turns;sequencing ability decreased;step length decreased;weight-shifting ability decreased  -LM    Gait, Impairments  strength decreased;impaired balance;pain  -AS strength decreased;impaired balance;pain  -LM    Gait, Comment  patient tolerated ambulation in hallway but had complaints of right shoulder pain  -AS Pt ambulated with step-through pattern with short, quick steps. Verbal cues to slow pace for safety, especially during turns. Verbal cues for upright posture, widen DEEJAY, and increase step length.  -LM    Recorded by  [AS] Katerine Laguna PTA [LM] Mary Smith PT    Functional Mobility    Functional Mobility- Comment Pt. declined.  Just up in [place] with PT.  -SHARON      Recorded by [SHARON] Myrtle Williamson OT      Therapy Exercises    Bilateral Lower Extremities  AROM:;10 reps;sitting;ankle pumps/circles;LAQ  -AS --   deferred due to fatigue  -LM    Right Upper Extremity AAROM:;AROM:;10 reps;sitting;elbow flexion/extension;hand pumps;pronation/supination;shoulder protraction/retraction;shoulder rolls/shrugs   wrist F/E, finger abd/add.  Neck ROM  -SHARON      RUE Resistance other (comment)   yellow foam block, yellow and pink issued.  -SHARON      Left Upper Extremity AROM:;10 reps;15 reps;sitting;elbow flexion/extension;shoulder abduction/adduction;shoulder extension/flexion;shoulder horizontal abd/add;shoulder protraction/retraction;shoulder rolls/shrugs  -SHARON      LUE Resistance manual resistance- minimal   biceps/triceps,  L hand gripper  -SHARON      Recorded by [SHARON] Myrtle Williamson, OT [AS] Katerine Laguna PTA [LM] Mary Smith PT    Positioning and Restraints    Pre-Treatment Position sitting in chair/recliner  -SHARON in bed  -AS sitting in chair/recliner  -LM    Post Treatment Position chair  -SHARON chair  -AS bed  -LM    In Bed   notified nsg;supine;call light within reach;encouraged to call for assist;exit alarm on;RUE elevated   with sling  -LM     In Chair reclined;call light within reach;encouraged to call for assist;exit alarm on;RUE elevated   in sling, LUE repositioned for increased comfort  -SHARON reclined;call light within reach;encouraged to call for assist;exit alarm on;waffle cushion  -AS     Recorded by [SHARON] Myrtle Williamson OT [AS] Katerine Laguna, PTA [LM] Mary Smith, PT      02/02/18 1303          Rehab Assessment/Intervention    Discipline occupational therapist  -AC      Document Type therapy note (daily note)  -AC      Subjective Information agree to therapy  -AC      Patient Effort, Rehab Treatment good  -AC      Precautions/Limitations fall precautions   R displaced prox humerus fx (non-operative)  -AC      Specific Treatment Considerations Right arm in sling;  Non WB RUE  -AC      Recorded by [AC] Julia Rodriguez OT      Pain Assessment    Pain Assessment Williamson-Mcallister FACES  -AC      Williamson-Mcallister FACES Pain Rating 2  -AC      Pain Score 2  -AC      Pain Type Acute pain  -AC      Pain Location Arm  -AC      Pain Orientation Right  -AC      Pain Intervention(s) Repositioned  -AC      Response to Interventions tolerated  -AC      Recorded by [AC] Julia Rodriguez OT      Cognitive Assessment/Intervention    Current Cognitive/Communication Assessment --   dealyed processing  -AC      Orientation Status oriented to;person;place;situation  -AC      Follows Commands/Answers Questions 100% of the time;needs cueing;needs repetition  -AC      Personal Safety mild impairment  -AC      Personal Safety Interventions fall prevention program maintained;gait belt;nonskid shoes/slippers when out of bed  -AC      Recorded by [AC] Julia Rodriguez OT      Bed Mobility, Assessment/Treatment    Bed Mobility, Comment UIC  -AC      Recorded by [AC] Julia Rodriguez OT      Transfer Assessment/Treatment    Transfers, Sit-Stand [place] verbal cues required;contact guard assist  -AC      Transfers, Stand-Sit [place] verbal cues required;contact guard assist   -AC      Transfers, Sit-Stand-Sit, Assist Device --   gait belt  -AC      Transfer, Impairments strength decreased;impaired balance  -AC      Recorded by [AC] Julia Rodriguez OT      Functional Mobility    Functional Mobility- Ind. Level verbal cues required;contact guard assist  -AC      Functional Mobility- Device --   gait belt  -AC      Functional Mobility-Distance (Feet) 60  -AC      Recorded by [AC] Julia Rodriguez OT      ADL Assessment/Intervention    Additional Documentation Self-Feeding Assessment/Training (Group)  -AC      Recorded by [AC] Julia Rodriguez OT      Lower Body Dressing Assessment/Training    LB Dressing Assess/Train, Clothing Type doffing:;donning:  -AC      LB Dressing Assess/Train, Position sitting  -AC      LB Dressing Assess/Train, [place] moderate assist (50% patient effort)  -AC      LB Dressing Assess/Train, Comment used left hand and cross leg technique;  required assist to initially place over toes  -AC      Recorded by [AC] Julia Rodriguez OT      Self-Feeding Assessment/Training    Self-Feeding Assess/Train, Comment pt with tremoring, but able to use left hand to drink from cup with lid without spills   -AC      Recorded by [AC] Julia Rodriguez OT      Balance Skills Training    Standing-Level of Assistance Contact guard  -AC      Static Standing Balance Support --   gait belt  -AC      Gait Balance-Level of Assistance Contact guard  -AC      Gait Balance Support --   gait belt  -AC      Recorded by [AC] Julia Rodriguez OT      Therapy Exercises    Right Upper Extremity AROM:;10 reps;elbow flexion/extension;hand pumps;pronation/supination   wrist f/e  -AC      Left Upper Extremity 10 reps;elbow flexion/extension;shoulder extension/flexion;shoulder horizontal abd/add  -AC      Recorded by [AC] Julia Rodriguez OT      Positioning and Restraints    Pre-Treatment Position sitting in chair/recliner  -AC      Post Treatment Position chair  -AC      In Chair notified nsg;reclined;call  light within reach;encouraged to call for assist;exit alarm on  -AC      Recorded by [AC] Julia Rodriguez, OT        User Key  (r) = Recorded By, (t) = Taken By, (c) = Cosigned By    Initials Name Effective Dates    SHARON Myrtle Williamson, OT 06/22/15 -     AC Julia Rodriguez, OT 06/23/15 -     AS Katerine Laguna, PTA 06/22/15 -     LM Mary Smith, PT 06/09/17 -                 OT Goals       02/05/18 1027 02/02/18 1349 01/31/18 1417    Bed Mobility OT LTG    Bed Mobility OT LTG, Outcome goal ongoing   Pt. already up in chair with PT.  -SHARON goal ongoing  -AC     Transfer Training OT LTG    Transfer Training OT LTG, Outcome goal ongoing   pt. just up with PT declined  -SHARON goal ongoing  -AC     Strength OT LTG    Strength Goal OT LTG, Date Established   01/31/18  -SHARON    Strength Goal OT LTG, Time to Achieve   1 wk  -SHARON    Strength Goal OT LTG, Measure to Achieve   Pt. will participated AAROM/AROM BUE (R elbow to hand only) 10-15 reps each session to support ADL and transfer indep.  -SHARON    Strength Goal OT LTG, Outcome goal partially met   met this session, cont to follow.  -SHARON goal partially met   met today for 10reps  -AC goal ongoing  -SHARON    UB Dressing OT LTG    UB Dressing Goal OT LTG, Outcome goal ongoing   Pt. wanting to stay position was in.  -SHARON goal ongoing  -AC       01/31/18 1415 01/29/18 1503       Bed Mobility OT LTG    Bed Mobility OT LTG, Time to Achieve  5 days  -ST     Bed Mobility OT LTG, Activity Type  supine to sit/sit to supine  -ST     Bed Mobility OT LTG, [place] Level  contact guard assist  -ST     Bed Mobility OT LTG, Assist Device  bed rails  -ST     Bed Mobility OT LTG, Outcome --   Pt. declined  -SHARON goal ongoing  -ST     Transfer Training OT LTG    Transfer Training OT LTG, Time to Achieve  5 days  -ST     Transfer Training OT LTG, Activity Type  bed to chair /chair to bed;toilet  -ST     Transfer Training OT LTG, [place] Level  supervision required  -ST     Transfer Training OT LTG,  Assist Device  cane, straight  -ST     Transfer Training OT LTG, Additional Goal  SPC if needed  -ST     Transfer Training OT LTG, Outcome goal ongoing   per pt. just in bed,  declined  -SHARON goal ongoing  -ST     UB Dressing OT LTG    UB Dressing Goal OT LTG, Time to Achieve  5 days  -ST     UB Dressing Goal OT LTG, [place] Level  minimum assist (75% patient effort)  -ST     UB Dressing Goal OT LTG, Additional Goal  with silverio technique  -ST     UB Dressing Goal OT LTG, Outcome goal ongoing   pt. declined to sit up to address  -SHARON goal ongoing  -ST       User Key  (r) = Recorded By, (t) = Taken By, (c) = Cosigned By    Initials Name Provider Type    SHARON Myrtle Williamson, OT Occupational Therapist    AC Julia Rodriguez, OT Occupational Therapist    ST Cindi Marroquin OTR Occupational Therapist          Occupational Therapy Education     Title: PT OT SLP Therapies (Active)     Topic: Occupational Therapy (Active)     Point: ADL training (Done)    Description: Instruct learner(s) on proper safety adaptation and remediation techniques during self care or transfers.   Instruct in proper use of assistive devices.    Learning Progress Summary    Learner Readiness Method Response Comment Documented by Status   Patient Acceptance E VU compensatory strategy for LBD, reviewed UE HEP AC 02/02/18 1348 Done    Eager E,D DU,NR  UD 02/01/18 1142 Done    Acceptance E VU  KS 02/01/18 0934 Done    Acceptance E,D VU,NR UE ROM and LE ROM exer. to support ADL and  mobility indep SHARON 01/31/18 1414 Done    Acceptance E,TB,D ANNY,DU role of OT, benfits of activity, WB'ing status, POC, transfers, bed mobility, safety ST 01/29/18 1502 Done               Point: Home exercise program (Active)    Description: Instruct learner(s) on appropriate technique for monitoring, assisting and/or progressing therapeutic exercises/activities.    Learning Progress Summary    Learner Readiness Method Response Comment Documented by Status   Patient Acceptance  E,D,H NR Pt. still needing constant cues with UE ROM exer. following priorly issued handout.  Encouragement for pt. to try to do more when therapy not there due to weakness R elbow, wrist and hand.  02/05/18 1026 Active    JAN Bui,NR   02/01/18 1142 Done    Acceptance E VU  KS 02/01/18 0934 Done    Acceptance E,D VU,NR UE ROM and LE ROM exer. to support ADL and  mobility indep  01/31/18 1414 Done    Acceptance KINDRA EASLEY D VU, DU role of OT, benfits of activity, WB'ing status, POC, transfers, bed mobility, safety ST 01/29/18 1502 Done               Point: Precautions (Done)    Description: Instruct learner(s) on prescribed precautions during self-care and functional transfers.    Learning Progress Summary    Learner Readiness Method Response Comment Documented by Status   Patient JAN Bui,NR   02/01/18 1142 Done    Acceptance E VU  KS 02/01/18 0934 Done    Acceptance JAN EASLEY,KASSIE UE ROM and LE ROM exer. to support ADL and  mobility indep  01/31/18 1414 Done    Acceptance KINDRA EASLEY D VU, DU role of OT, benfits of activity, WB'ing status, POC, transfers, bed mobility, safety  01/29/18 1502 Done               Point: Body mechanics (Done)    Description: Instruct learner(s) on proper positioning and spine alignment during self-care, functional mobility activities and/or exercises.    Learning Progress Summary    Learner Readiness Method Response Comment Documented by Status   Patient JAN Bui,NR   02/01/18 1142 Done    Acceptance E VU  KS 02/01/18 0934 Done    Acceptance KINDRA EASLEY D VU, DU role of OT, benfits of activity, WB'ing status, POC, transfers, bed mobility, safety  01/29/18 1502 Done                      User Key     Initials Effective Dates Name Provider Type Discipline     06/22/15 -  Myrtle Williamson, OT Occupational Therapist OT     06/23/15 -  Julia Rodriguez, OT Occupational Therapist OT     06/22/15 -  Loli León PTA Physical Therapy Assistant PT    ST 02/20/17 -  BRIDGETT Burdick  Occupational Therapist OT    KS 09/18/16 -  Jeni Fletcher RN Registered Nurse Nurse                  OT Recommendation and Plan  Anticipated Equipment Needs At Discharge:  (TBD based on placement)  Anticipated Discharge Disposition: skilled nursing facility  Planned Therapy Interventions: adaptive equipment training, ADL retraining, balance training, bed mobility training, home exercise program, ROM (Range of Motion), transfer training  Therapy Frequency: daily  Plan of Care Review  Plan Of Care Reviewed With: patient  Progress: progress towards functional goals is fair  Outcome Summary/Follow up Plan: Pt. noted with much tightness in scapulas and neck with scapula pain.  Improved with ROM.  Pt. cont. to need max cues with ROM exer.  Encouraged to do more on own following handout due to weakness in elbow, wrist and hand LUE.        Outcome Measures       02/05/18 0917 02/05/18 0854 02/03/18 1438    How much help from another person do you currently need...    Turning from your back to your side while in flat bed without using bedrails?  2  -AS 2  -LM    Moving from lying on back to sitting on the side of a flat bed without bedrails?  2  -AS 2  -LM    Moving to and from a bed to a chair (including a wheelchair)?  3  -AS 3  -LM    Standing up from a chair using your arms (e.g., wheelchair, bedside chair)?  3  -AS 3  -LM    Climbing 3-5 steps with a railing?  2  -AS 2  -LM    To walk in hospital room?  3  -AS 3  -LM    AM-PAC 6 Clicks Score  15  -AS 15  -LM    How much help from another is currently needed...    Putting on and taking off regular lower body clothing? 2  -SHARON      Bathing (including washing, rinsing, and drying) 2  -SHARON      Toileting (which includes using toilet bed pan or urinal) 2  -SHARON      Putting on and taking off regular upper body clothing 2  -SHARON      Taking care of personal grooming (such as brushing teeth) 2  -SHARON      Eating meals 3  -SHARON      Score 13  -SHARON      Functional Assessment    Outcome  Measure Options AM-PAC 6 Clicks Daily Activity (OT)  -SHARON AM-PAC 6 Clicks Basic Mobility (PT)  -AS AM-PAC 6 Clicks Basic Mobility (PT)  -      02/02/18 1303          How much help from another is currently needed...    Putting on and taking off regular lower body clothing? 2  -AC      Bathing (including washing, rinsing, and drying) 2  -AC      Toileting (which includes using toilet bed pan or urinal) 2  -AC      Putting on and taking off regular upper body clothing 2  -AC      Taking care of personal grooming (such as brushing teeth) 2  -AC      Eating meals 3  -AC      Score 13  -AC      Functional Assessment    Outcome Measure Options AM-PAC 6 Clicks Daily Activity (OT)  -AC        User Key  (r) = Recorded By, (t) = Taken By, (c) = Cosigned By    Initials Name Provider Type    SHARON Williamson, OT Occupational Therapist    AC Julia Rodriguez, OT Occupational Therapist    AS Katerine Laguna, PTA Physical Therapy Assistant    LM Mary Smith, PT Physical Therapist           Time Calculation:         Time Calculation- OT       02/05/18 1030          Time Calculation- OT    OT Start Time 0917  -      Total Timed Code Minutes- OT 27 minute(s)  -SHARON      OT Received On 02/05/18  -      OT Goal Re-Cert Due Date 02/08/18  -        User Key  (r) = Recorded By, (t) = Taken By, (c) = Cosigned By    Initials Name Provider Type    SHARON Williamson OT Occupational Therapist           Therapy Charges for Today     Code Description Service Date Service Provider Modifiers Qty    [id number]  OT THERAPEUTIC ACT EA 15 MIN 2/5/2018 Myrtle Williamson OT GO 1    [id number] HC OT THER SUPP EA 15 MIN 2/5/2018 Myrtle Williamson OT GO 2          OT G-codes  OT Professional Judgement Used?: Yes  OT Functional Scales Options: AM-PAC 6 Clicks Daily Activity (OT)  Score: 8  Functional Limitation: Self care  Self Care Current Status (): At least 80 percent but less than 100 percent impaired, limited or restricted  Self Care  Goal Status (): At least 60 percent but less than 80 percent impaired, limited or restricted    Myrtle Williamson, OT  2/5/2018

## 2018-02-05 NOTE — THERAPY TREATMENT NOTE
Acute Care - Physical Therapy Treatment Note  [facility]     Patient Name: Liane Griffin  : 1950  MRN: [id number]  Today's Date: 2018  Onset of Illness/Injury or Date of Surgery Date: 18  Date of Referral to PT: 18  Referring Physician: JOE Mckeon    Admit Date: 2018    Visit Dx:    ICD-10-CM ICD-9-CM   1. Torticollis, acute M43.6 723.5   2. 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 Physical deconditioning R53.81 799.3   4. Impaired mobility and ADLs Z74.09 799.89   5. Impaired functional mobility, balance, gait, and endurance Z74.09 V49.89     Patient Active Problem List   Diagnosis   • Immobility   • CAD (coronary artery disease)   • Diabetes mellitus, type II   • HTN (hypertension)   • HLD (hyperlipidemia)   • Schizophrenia   • Anemia   • Tremor               Adult Rehabilitation Note       18 0854 18 1438 18 1303    Rehab Assessment/Intervention    Discipline physical therapy assistant  -AS physical therapist  -LM occupational therapist  -AC    Document Type therapy note (daily note)  -AS therapy note (daily note)  -LM therapy note (daily note)  -AC    Subjective Information agree to therapy;complains of;pain  -AS agree to therapy;complains of;pain  -LM agree to therapy  -AC    Patient Effort, Rehab Treatment good  -AS good  -LM good  -AC    Symptoms Noted During/After Treatment increased pain  -AS fatigue;shortness of breath  -LM     Symptoms Noted Comment  SOA following ambulation, O2 sats monitored. RN notified  -LM     Precautions/Limitations fall precautions;other (see comments)   sling for comfort, NWB R UE  -AS fall precautions;non-weight bearing status   NWB RUE, RUE in sling, non-op humerus fx  -LM fall precautions   R displaced prox humerus fx (non-operative)  -AC    Specific Treatment Considerations  tremoring of left UE and bilateral LEs after ambulation. Pt desat to high  80s after ambulation, required cues for PLB to recover  -LM Right arm in sling;  Non WB RUE  -AC    Recorded by [AS] Katerine Laguna PTA [LM] Mary Smith, PT [AC] Julia Rodriguez OT    Vital Signs    O2 Delivery Pre Treatment  room air  -LM     Intra SpO2 (%)  86  -LM     O2 Delivery Intra Treatment  room air  -LM     Post SpO2 (%)  92  -LM     O2 Delivery Post Treatment  supplemental O2   2L; notified RN  -LM     Pre Patient Position  Sitting  -LM     Intra Patient Position  Standing  -LM     Post Patient Position  Supine  -LM     Recorded by  [LM] Mary Smith PT     Pain Assessment    Pain Assessment 0-10  -AS Williamson-Mcallister FACES  -LM Williamson-Mcallister FACES  -AC    Williamson-Mcallister FACES Pain Rating  4  -LM 2  -AC    Pain Score 7  -AS  2  -AC    Post Pain Score 7  -AS      Pain Type Acute pain  -AS Acute pain  -LM Acute pain  -AC    Pain Location Shoulder  -AS Arm  -LM Arm  -AC    Pain Orientation Right  -AS Right  -LM Right  -AC    Patient's Stated Pain Goal No pain  -AS      Pain Intervention(s) Repositioned;Ambulation/increased activity  -AS Repositioned;Ambulation/increased activity  -LM Repositioned  -AC    Response to Interventions tolerated  -AS  tolerated  -AC    Recorded by [AS] Katerine Laguna PTA [LM] Mary Smith, PT [AC] Julia Rodriguez OT    Cognitive Assessment/Intervention    Current Cognitive/Communication Assessment functional  -AS functional  -LM --   dealyed processing  -AC    Orientation Status oriented to;person;place  -AS oriented x 4  -LM oriented to;person;place;situation  -AC    Follows Commands/Answers Questions 100% of the time;able to follow single-step instructions  -AS 75% of the time;100% of the time;able to follow single-step instructions;needs cueing  -% of the time;needs cueing;needs repetition  -AC    Personal Safety mild impairment;decreased awareness, need for assist  -AS mild impairment;decreased awareness, need for assist;decreased awareness, need for  safety;decreased insight to deficits  -LM mild impairment  -AC    Personal Safety Interventions fall prevention program maintained;gait belt;nonskid shoes/slippers when out of bed;other (see comments)   exit alarm  -AS  fall prevention program maintained;gait belt;nonskid shoes/slippers when out of bed  -AC    Recorded by [AS] Katerine Laguna PTA [LM] Mary Smith, PT [AC] Julia Rodriguez OT    Bed Mobility, Assessment/Treatment    Bed Mobility, Assistive Device  head of bed elevated  -LM     Bed Mobility, Scoot/Bridge, [place]  maximum assist (25% patient effort);2 person assist required;verbal cues required   scooting upwards in bed  -LM     Bed Mob, Supine to Sit, [place] verbal cues required;minimum assist (75% patient effort)  -AS not tested   Pt received UIC  -LM     Bed Mob, Sit to Supine, [place]  maximum assist (25% patient effort);verbal cues required  -LM     Bed Mobility, Safety Issues  decreased use of arms for pushing/pulling;decreased use of legs for bridging/pushing  -LM     Bed Mobility, Impairments  strength decreased;impaired balance;pain  -LM     Bed Mobility, Comment assist at trunk to reach EOB due to NWB R UE  -AS Verbal cues for sequencing and NWB status. After returning to bed after ambulation, pt demo SOA and O2 monitored. Cues for PLB. Pt exhibited tremoring in left UE and bilateral LEs, reported it was nervousness for moving right UE. O2 sats increased with rest and PLB.  -LM UIC  -AC    Recorded by [AS] Katerine Laguna PTA [LM] Mary Smith, PT [AC] Julia Rodriguez OT    Transfer Assessment/Treatment    Transfers, Sit-Stand [place] verbal cues required;minimum assist (75% patient effort)  -AS contact guard assist;verbal cues required  -LM verbal cues required;contact guard assist  -AC    Transfers, Stand-Sit [place] verbal cues required;minimum assist (75% patient effort)  -AS contact guard assist;verbal cues required  -LM verbal cues required;contact  guard assist  -AC    Transfers, Sit-Stand-Sit, Assist Device other (see comments)   B UE support on left  -AS other (see comments)   UE support, gait belt  -LM --   gait belt  -AC    Toilet Transfer, [place]  contact guard assist;verbal cues required  -LM     Toilet Transfer, Assistive Device  elevated toilet seat;other (see comments)   grab bar, gait belt  -LM     Transfer, Safety Issues  sequencing ability decreased;step length decreased;weight-shifting ability decreased  -LM     Transfer, Impairments impaired balance;strength decreased;pain  -AS strength decreased;impaired balance;pain  -LM strength decreased;impaired balance  -AC    Transfer, Comment patient unsteady on initial stand but given a few seconds patient able to readjust  -AS Verbal cues for correct hand placement and safety.  -LM     Recorded by [AS] Katerine Laguna PTA [LM] Mary Smith, PT [AC] Julia Rodriguez OT    Gait Assessment/Treatment    Gait, [place] Level verbal cues required;minimum assist (75% patient effort)  -AS minimum assist (75% patient effort);verbal cues required  -LM     Gait, Assistive Device other (see comments)   B UE on left  -AS other (see comments)   hand-held assist LUE, gait belt  -LM     Gait, Distance (Feet) 200  -  -LM     Gait, Gait Pattern Analysis  swing-through gait  -LM     Gait, Gait Deviations antalgic;isaiah decreased;forward flexed posture;step length decreased  -AS bilateral:;decreased heel strike;forward flexed posture;narrow base;step length decreased;toe-to-floor clearance decreased;weight-shifting ability decreased  -LM     Gait, Safety Issues weight-shifting ability decreased;step length decreased  -AS balance decreased during turns;sequencing ability decreased;step length decreased;weight-shifting ability decreased  -LM     Gait, Impairments strength decreased;impaired balance;pain  -AS strength decreased;impaired balance;pain  -LM     Gait, Comment patient tolerated ambulation in  hallway but had complaints of right shoulder pain  -AS Pt ambulated with step-through pattern with short, quick steps. Verbal cues to slow pace for safety, especially during turns. Verbal cues for upright posture, widen DEEJAY, and increase step length.  -LM     Recorded by [AS] Katerine Laguna PTA [LM] Mary Smith, PT     Functional Mobility    Functional Mobility- Ind. Level   verbal cues required;contact guard assist  -AC    Functional Mobility- Device   --   gait belt  -AC    Functional Mobility-Distance (Feet)   60  -AC    Recorded by   [AC] Julia Rodriguez, OT    ADL Assessment/Intervention    Additional Documentation   Self-Feeding Assessment/Training (Group)  -AC    Recorded by   [AC] Julia Rodriguez, OT    Lower Body Dressing Assessment/Training    LB Dressing Assess/Train, Clothing Type   doffing:;donning:  -AC    LB Dressing Assess/Train, Position   sitting  -AC    LB Dressing Assess/Train, [place]   moderate assist (50% patient effort)  -AC    LB Dressing Assess/Train, Comment   used left hand and cross leg technique;  required assist to initially place over toes  -AC    Recorded by   [AC] Julia Rodriguez OT    Self-Feeding Assessment/Training    Self-Feeding Assess/Train, Comment   pt with tremoring, but able to use left hand to drink from cup with lid without spills   -AC    Recorded by   [AC] Julia Rodriguez, OT    Balance Skills Training    Standing-Level of Assistance   Contact guard  -AC    Static Standing Balance Support   --   gait belt  -AC    Gait Balance-Level of Assistance   Contact guard  -AC    Gait Balance Support   --   gait belt  -AC    Recorded by   [AC] Julia Rodriguez, OT    Therapy Exercises    Bilateral Lower Extremities AROM:;10 reps;sitting;ankle pumps/circles;LAQ  -AS --   deferred due to fatigue  -LM     Right Upper Extremity   AROM:;10 reps;elbow flexion/extension;hand pumps;pronation/supination   wrist f/e  -AC    Left Upper Extremity   10 reps;elbow  flexion/extension;shoulder extension/flexion;shoulder horizontal abd/add  -AC    Recorded by [AS] Katerine Laguna PTA [LM] Mary Smith, PT [AC] Julia Rodriguez OT    Positioning and Restraints    Pre-Treatment Position in bed  -AS sitting in chair/recliner  -LM sitting in chair/recliner  -AC    Post Treatment Position chair  -AS bed  -LM chair  -AC    In Bed  notified nsg;supine;call light within reach;encouraged to call for assist;exit alarm on;RUE elevated   with sling  -LM     In Chair reclined;call light within reach;encouraged to call for assist;exit alarm on;waffle cushion  -AS  notified nsg;reclined;call light within reach;encouraged to call for assist;exit alarm on  -AC    Recorded by [AS] Katerine Laguna PTA [LM] Mary Smith, PT [AC] Julia Rodriguez OT      User Key  (r) = Recorded By, (t) = Taken By, (c) = Cosigned By    Initials Name Effective Dates    AC Julia Rodriguez, MARLON 06/23/15 -     AS Katerine Laguna PTA 06/22/15 -     LM Mary Smith PT 06/09/17 -                 IP PT Goals       02/05/18 0948 02/03/18 1523 02/01/18 1142    Bed Mobility PT LTG    Bed Mobility PT LTG, Date Goal Reviewed 02/05/18  -AS 02/03/18  -LM     Bed Mobility PT LTG, Outcome goal ongoing  -AS goal ongoing  -LM goal ongoing  -UD    Transfer Training PT LTG    Transfer Training PT  LTG, Date Goal Reviewed 02/05/18  -AS 02/03/18  -LM     Transfer Training PT LTG, Outcome goal ongoing  -AS goal ongoing  -LM goal ongoing  -UD    Gait Training PT LTG    Gait Training Goal PT LTG, Date Goal Reviewed 02/05/18  -AS 02/03/18  -LM     Gait Training Goal PT LTG, Outcome goal ongoing  -AS goal ongoing  -LM goal ongoing  -UD      01/30/18 1308 01/29/18 0931 01/28/18 1159    Bed Mobility PT LTG    Bed Mobility PT LTG, Time to Achieve   4 days  -CT    Bed Mobility PT LTG, Activity Type   all bed mobility  -CT    Bed Mobility PT LTG, [place] Level   independent  -CT    Bed Mobility PT LTG, Date Goal Reviewed   01/29/18  -AS     Bed Mobility PT LTG, Outcome goal ongoing  -KR goal ongoing  -AS     Transfer Training PT LTG    Transfer Training PT LTG, Time to Achieve   4 days  -CT    Transfer Training PT LTG, [place] Level   independent  -CT    Transfer Training PT  LTG, Date Goal Reviewed  01/29/18  -AS     Transfer Training PT LTG, Outcome goal ongoing  -KR goal ongoing  -AS     Gait Training PT LTG    Gait Training Goal PT LTG, Date Established   01/28/18  -CT    Gait Training Goal PT LTG, [place] Level   conditional independence;supervision required  -CT    Gait Training Goal PT LTG, Assist Device   other (see comments)  -CT    Gait Training Goal PT LTG, Distance to Achieve   hha to 500  -CT    Gait Training Goal PT LTG, Date Goal Reviewed  01/29/18  -AS     Gait Training Goal PT LTG, Outcome goal ongoing  -KR goal ongoing  -AS       User Key  (r) = Recorded By, (t) = Taken By, (c) = Cosigned By    Initials Name Provider Type    UD Loli León, PTA Physical Therapy Assistant    AS Katerine Laguna, PTA Physical Therapy Assistant    CT Chris Che, PT Physical Therapist    KR Ava Aly, PT Physical Therapist    GABBY Smith, PT Physical Therapist          Physical Therapy Education     Title: PT OT SLP Therapies (Active)     Topic: Physical Therapy (Active)     Point: Mobility training (Active)    Learning Progress Summary    Learner Readiness Method Response Comment Documented by Status   Patient Acceptance E NR  AS 02/05/18 0947 Active    Acceptance E,D NR Reviewed benefits of activity, WB status, correct gait mechanics, safety with mobility. LM 02/03/18 1523 Active    Eager E,D DU,NR  UD 02/01/18 1142 Done    Acceptance E VU  KS 02/01/18 0934 Done    Acceptance E NR  KR 01/30/18 1308 Active    Acceptance E NR  AS 01/29/18 0931 Active    Acceptance E NR  CT 01/28/18 1201 Active               Point: Home exercise program (Active)    Learning Progress Summary    Learner Readiness Method  Response Comment Documented by Status   Patient Acceptance E NR  AS 02/05/18 0947 Active    Acceptance E,D NR Reviewed benefits of activity, WB status, correct gait mechanics, safety with mobility.  02/03/18 1523 Active    Eager JAN EASLEY,NR  UD 02/01/18 1142 Done    Acceptance E VU  KS 02/01/18 0934 Done    Acceptance E NR  AS 01/29/18 0931 Active    Acceptance E NR  CT 01/28/18 1201 Active               Point: Body mechanics (Active)    Learning Progress Summary    Learner Readiness Method Response Comment Documented by Status   Patient Acceptance E NR  AS 02/05/18 0947 Active    Acceptance E,D NR Reviewed benefits of activity, WB status, correct gait mechanics, safety with mobility.  02/03/18 1523 Active    Eager JAN EASLEY,NR  UD 02/01/18 1142 Done    Acceptance E VU  KS 02/01/18 0934 Done    Acceptance E NR  KR 01/30/18 1308 Active    Acceptance E NR  AS 01/29/18 0931 Active    Acceptance E NR  CT 01/28/18 1201 Active               Point: Precautions (Active)    Learning Progress Summary    Learner Readiness Method Response Comment Documented by Status   Patient Acceptance E NR  AS 02/05/18 0947 Active    Acceptance E,D NR Reviewed benefits of activity, WB status, correct gait mechanics, safety with mobility.  02/03/18 1523 Active    JAN Bui,NR  UD 02/01/18 1142 Done    Acceptance E VU  KS 02/01/18 0934 Done    Acceptance E NR  KR 01/30/18 1308 Active    Acceptance E NR  AS 01/29/18 0931 Active    Acceptance E NR  CT 01/28/18 1201 Active                      User Key     Initials Effective Dates Name Provider Type Discipline     06/22/15 -  Loli León, PTA Physical Therapy Assistant PT    AS 06/22/15 -  Katerine Laguna, PTA Physical Therapy Assistant PT    CT 06/19/15 -  Chris Che, PT Physical Therapist PT    KS 09/18/16 -  Jeni Fletcher, KEZIA Registered Nurse Nurse     09/25/17 -  Ava Aly, PT Physical Therapist PT     06/09/17 -  Mary Smith, PT Physical Therapist PT                     PT Recommendation and Plan  Planned Therapy Interventions: balance training, bed mobility training, gait training, home exercise program, strengthening  Plan of Care Review  Plan Of Care Reviewed With: patient  Progress: improving  Outcome Summary/Follow up Plan: patient did well with mobility, continues to need assist with bed mobility due to NWB on R UE, increased right shoulder pain with ambulation today.          Outcome Measures       02/05/18 0854 02/03/18 1438 02/02/18 1303    How much help from another person do you currently need...    Turning from your back to your side while in flat bed without using bedrails? 2  -AS 2  -LM     Moving from lying on back to sitting on the side of a flat bed without bedrails? 2  -AS 2  -LM     Moving to and from a bed to a chair (including a wheelchair)? 3  -AS 3  -LM     Standing up from a chair using your arms (e.g., wheelchair, bedside chair)? 3  -AS 3  -LM     Climbing 3-5 steps with a railing? 2  -AS 2  -LM     To walk in hospital room? 3  -AS 3  -LM     AM-PAC 6 Clicks Score 15  -AS 15  -LM     How much help from another is currently needed...    Putting on and taking off regular lower body clothing?   2  -AC    Bathing (including washing, rinsing, and drying)   2  -AC    Toileting (which includes using toilet bed pan or urinal)   2  -AC    Putting on and taking off regular upper body clothing   2  -AC    Taking care of personal grooming (such as brushing teeth)   2  -AC    Eating meals   3  -AC    Score   13  -AC    Functional Assessment    Outcome Measure Options AM-PAC 6 Clicks Basic Mobility (PT)  -AS AM-PAC 6 Clicks Basic Mobility (PT)  -LM AM-PAC 6 Clicks Daily Activity (OT)  -AC      User Key  (r) = Recorded By, (t) = Taken By, (c) = Cosigned By    Initials Name Provider Type    AC Julia Rodriguez, OT Occupational Therapist    AS Katerine Laguna, KORINA Physical Therapy Assistant    GABBY Smith, PT Physical Therapist           Time Calculation:          PT Charges       02/05/18 0949          Time Calculation    Start Time 0854  -AS      PT Received On 02/05/18  -AS      PT Goal Re-Cert Due Date 02/07/18  -AS      Time Calculation- PT    Total Timed Code Minutes- PT 23 minute(s)  -AS        User Key  (r) = Recorded By, (t) = Taken By, (c) = Cosigned By    Initials Name Provider Type    AS Katerine Laguna PTA Physical Therapy Assistant          Therapy Charges for Today     Code Description Service Date Service Provider Modifiers Qty    71166249681 HC GAIT TRAINING EA 15 MIN 2/5/2018 Katerine Laguna PTA GP 1    85613590874 HC PT THER PROC EA 15 MIN 2/5/2018 Katerine Laguna, KORINA GP 1          PT G-Codes  PT Professional Judgement Used?: Yes  Outcome Measure Options: AM-PAC 6 Clicks Basic Mobility (PT)  Score: 15  Functional Limitation: Mobility: Walking and moving around  Mobility: Walking and Moving Around Current Status (): At least 40 percent but less than 60 percent impaired, limited or restricted  Mobility: Walking and Moving Around Goal Status (): At least 20 percent but less than 40 percent impaired, limited or restricted    Katerine Laguna PTA  2/5/2018

## 2018-02-05 NOTE — PLAN OF CARE
Problem: Patient Care Overview (Adult)  Goal: Plan of Care Review  Outcome: Ongoing (interventions implemented as appropriate)   02/05/18 1027   Coping/Psychosocial Response Interventions   Plan Of Care Reviewed With patient   Patient Care Overview   Progress progress towards functional goals is fair   Outcome Evaluation   Outcome Summary/Follow up Plan Pt. noted with much tightness in scapulas and neck with scapula pain. Improved with ROM. Pt. cont. to need max cues with ROM exer. Encouraged to do more on own following handout due to weakness in elbow, wrist and hand LUE.       Problem: Inpatient Occupational Therapy  Goal: Bed Mobility Goal LTG- OT  Outcome: Ongoing (interventions implemented as appropriate)   01/29/18 1503 02/05/18 1027   Bed Mobility OT LTG   Bed Mobility OT LTG, Time to Achieve 5 days --    Bed Mobility OT LTG, Activity Type supine to sit/sit to supine --    Bed Mobility OT LTG, [place] Level contact guard assist --    Bed Mobility OT LTG, Assist Device bed rails --    Bed Mobility OT LTG, Outcome --  goal ongoing  (Pt. already up in chair with PT.)     Goal: Transfer Training Goal 1 LTG- OT  Outcome: Ongoing (interventions implemented as appropriate)   01/29/18 1503 02/05/18 1027   Transfer Training OT LTG   Transfer Training OT LTG, Time to Achieve 5 days --    Transfer Training OT LTG, Activity Type bed to chair /chair to bed;toilet --    Transfer Training OT LTG, [place] Level supervision required --    Transfer Training OT LTG, Assist Device cane, straight --    Transfer Training OT LTG, Additional Goal SPC if needed --    Transfer Training OT LTG, Outcome --  goal ongoing  (pt. just up with PT declined)     Goal: UB Dressing Goal LTG- OT  Outcome: Ongoing (interventions implemented as appropriate)   01/29/18 1503 02/05/18 1027   UB Dressing OT LTG   UB Dressing Goal OT LTG, Time to Achieve 5 days --    UB Dressing Goal OT LTG, [place] Level minimum assist (75% patient  effort) --    UB Dressing Goal OT LTG, Additional Goal with silverio technique --    UB Dressing Goal OT LTG, Outcome --  goal ongoing  (Pt. wanting to stay position was in.)     Goal: Strength Goal LTG- OT  Outcome: Ongoing (interventions implemented as appropriate)   01/31/18 1417 02/05/18 1027   Strength OT LTG   Strength Goal OT LTG, Date Established 01/31/18 --    Strength Goal OT LTG, Time to Achieve 1 wk --    Strength Goal OT LTG, Measure to Achieve Pt. will participated AAROM/AROM BUE (R elbow to hand only) 10-15 reps each session to support ADL and transfer indep. --    Strength Goal OT LTG, Outcome --  goal partially met  (met this session, cont to follow.)

## 2018-02-05 NOTE — PLAN OF CARE
Problem: Patient Care Overview (Adult)  Goal: Plan of Care Review  Outcome: Ongoing (interventions implemented as appropriate)   02/05/18 0948   Coping/Psychosocial Response Interventions   Plan Of Care Reviewed With patient   Patient Care Overview   Progress improving   Outcome Evaluation   Outcome Summary/Follow up Plan patient did well with mobility, continues to need assist with bed mobility due to NWB on R UE, increased right shoulder pain with ambulation today.       Problem: Inpatient Physical Therapy  Goal: Bed Mobility Goal LTG- PT  Outcome: Ongoing (interventions implemented as appropriate)   01/28/18 1159 02/05/18 0948   Bed Mobility PT LTG   Bed Mobility PT LTG, Time to Achieve 4 days --    Bed Mobility PT LTG, Activity Type all bed mobility --    Bed Mobility PT LTG, [place] Level independent --    Bed Mobility PT LTG, Date Goal Reviewed --  02/05/18   Bed Mobility PT LTG, Outcome --  goal ongoing     Goal: Transfer Training Goal 1 LTG- PT  Outcome: Ongoing (interventions implemented as appropriate)   01/28/18 1159 02/05/18 0948   Transfer Training PT LTG   Transfer Training PT LTG, Time to Achieve 4 days --    Transfer Training PT LTG, [place] Level independent --    Transfer Training PT LTG, Date Goal Reviewed --  02/05/18   Transfer Training PT LTG, Outcome --  goal ongoing     Goal: Gait Training Goal LTG- PT  Outcome: Ongoing (interventions implemented as appropriate)   01/28/18 1159 02/05/18 0948   Gait Training PT LTG   Gait Training Goal PT LTG, Date Established 01/28/18 --    Gait Training Goal PT LTG, [place] Level conditional independence;supervision required --    Gait Training Goal PT LTG, Assist Device other (see comments) --    Gait Training Goal PT LTG, Distance to Achieve hha to 500 --    Gait Training Goal PT LTG, Date Goal Reviewed --  02/05/18   Gait Training Goal PT LTG, Outcome --  goal ongoing

## 2018-02-05 NOTE — PROGRESS NOTES
Continued Stay Note  [facility]     Patient Name: Liane Griffin  MRN: [id number]  Today's Date: 2/5/2018    Admit Date: 1/27/2018          Discharge Plan       02/05/18 1601    Case Management/Social Work Plan    Plan Rehab    Additional Comments Spoke with [facility], who report patient's insurance company ([employer]) has denied her transfer due to facility being out of network.  Informed [facility] in network.  Contacted Liz in Admissions/[facility], who is agreeable to review patient insurance/medical record for possible admission.  Leola Mims, Ext. [phone]                  Discharge Codes     None        Expected Discharge Date and Time     Expected Discharge Date Expected Discharge Time    Feb 2, 2018             GUDELIA Wiley

## 2018-02-05 NOTE — PLAN OF CARE
Problem: Patient Care Overview (Adult)  Goal: Plan of Care Review  Outcome: Ongoing (interventions implemented as appropriate)   02/05/18 3700   Coping/Psychosocial Response Interventions   Plan Of Care Reviewed With patient   Patient Care Overview   Progress improving   Outcome Evaluation   Outcome Summary/Follow up Plan Alert and oriented. Respirations unlabored. SB monitor. Medicated pain twice this shift. Up in recliner with meals. Ambulated to bathroom one assist. Sling in place. Continued prn oral medication and prune juice for constipation without success. Family in for brief visit.        Problem: Fall Risk (Adult)  Goal: Identify Related Risk Factors and Signs and Symptoms  Outcome: Ongoing (interventions implemented as appropriate)    Goal: Absence of Falls  Outcome: Ongoing (interventions implemented as appropriate)      Problem: Orthopaedic Fracture (Adult)  Goal: Signs and Symptoms of Listed Potential Problems Will be Absent or Manageable (Orthopaedic Fracture)  Outcome: Ongoing (interventions implemented as appropriate)

## 2018-02-05 NOTE — PROGRESS NOTES
[facility] Medicine Services  PROGRESS NOTE    Patient Name: Liane Griffin  : 1950  MRN: [id number]    Date of Admission: 2018  Length of Stay: 0  Primary Care Physician: No Known Provider    Subjective     CC: f/u AMS    HPI:  Laying in bed, no family at bedside. She complains of pain in her right upper arm, it is 7/10 and improved with current pain regimen. Still no BM.     Review of Systems  Gen- No fevers, chills  CV- No chest pain, palpitations  Resp- No cough, dyspnea  GI- No N/V/D, abd pain    Otherwise ROS is negative except as mentioned in the HPI.    Objective     Vital Signs:   Temp:  [97.5 °F (36.4 °C)-99.1 °F (37.3 °C)] 99.1 °F (37.3 °C)  Heart Rate:  [56-84] 84  Resp:  [16-20] 18  BP: (106-127)/(60-84) 127/60        Physical Exam:  Constitutional: No acute distress, awake, alert and conversant.   HENT: NCAT, mucous membranes moist  Respiratory: Anterior lung fields CTAB without wheezes, rales or rhonchi. Normal respiratory effort. On room air.    Cardiovascular: RRR, no murmurs, rubs, or gallops, palpable pedal pulses bilaterally  Gastrointestinal: Positive bowel sounds, soft, nontender, nondistended  Musculoskeletal: R arm with mild edema and anterior shoulder ecchymosis. Immobilized in sling.   Psychiatric: Appropriate affect, cooperative  Neurologic: Oriented to person, place and time today. Interactive and follows commands. Strength symmetric in all extremities, Cranial Nerves grossly intact to confrontation, speech clear and appropriate.   Skin: No rashes    Results Reviewed:  I have personally reviewed current lab, radiology, and data and agree.              Invalid input(s):  ALKPHOS, TROPONININT  Estimated Creatinine Clearance: 68.5 mL/min (by C-G formula based on Cr of 0.7).  No results found for: BNP  No results found for: PHART    Microbiology Results Abnormal     Procedure Component Value - Date/Time    Blood Culture - Blood, [[id number]]  (Normal)  Collected:  01/27/18 1255    Lab Status:  Final result Specimen:  Blood from Arm, Right Updated:  02/01/18 1316     Blood Culture No growth at 5 days    Blood Culture - Blood, [[id number]]  (Normal) Collected:  01/27/18 1255    Lab Status:  Final result Specimen:  Blood from Arm, Left Updated:  02/01/18 1316     Blood Culture No growth at 5 days    Influenza A & B, RT PCR - Swab, Nasopharynx [[id number]]  (Normal) Collected:  01/28/18 0542    Lab Status:  Final result Specimen:  Swab from Nasopharynx Updated:  01/28/18 1101     Influenza A PCR Not Detected     Influenza B PCR Not Detected    Influenza Antigen, Rapid - Swab, Nasopharynx [[id number]]  (Normal) Collected:  01/27/18 1305    Lab Status:  Final result Specimen:  Swab from Nasopharynx Updated:  01/27/18 1332     Influenza A Ag, EIA Negative     Influenza B Ag, EIA Negative        Imaging Results (last 24 hours)     ** No results found for the last 24 hours. **        I have reviewed the medications.    Assessment / Plan     Hospital Problem List     Immobility    CAD (coronary artery disease) (Chronic)    Diabetes mellitus, type II (Chronic)    HTN (hypertension) (Chronic)    HLD (hyperlipidemia) (Chronic)    Schizophrenia (Chronic)    Anemia    Tremor        Brief Hospital Course to date:  Liane Griffin is a 67 y.o. female with PMH significant for HTN, hyperlipidemia, CAD, non-insulin dependent DMII and schizophrenia. She sustained a humeral head fracture on 1/19/18 and was treated at [facility] twice. No surgical intervention recommended. She presented to [facility] ED on 1/27/18 secondary to R arm and shoulder pain. When she was to be discharged from the ED, [facility] refused to accept her back, stating that she needed more care than they could provide.     Assessment & Plan:  - Appreciate ortho evaluation. Recommend non-operative management and pain control. Non-weight bearing to RUE. Follow up with Dr. Guidry or her prior orthopedist @ [place] in 2  weeks (approx. 2/12)  - Baseline mental status is unclear. She continues to be intermittently altered. Continue home psych medications.   - Anemia studies consistent with iron deficiency anemia. Continue PO iron replacement.   - Using minimal SSI. Stop accuchecks. Consider discontinuing home metformin at d/c or reducing dosage.  - Increase bowel regimen for constipation. Encouraged OOB for toileting and increasing mobility as tolerated.     Case management following. Awaiting Medicaid pending bed.  Per 2/2 CM note, [facility] would need to do an on-site evaluation in order for her to return.      DVT Prophylaxis:  [facility]  CODE STATUS: Full Code    Disposition: I expect the patient to be discharged when bed available, CM working on difficult placement

## 2018-02-05 NOTE — PLAN OF CARE
Problem: Patient Care Overview (Adult)  Goal: Plan of Care Review  Outcome: Ongoing (interventions implemented as appropriate)   02/05/18 0406   Coping/Psychosocial Response Interventions   Plan Of Care Reviewed With patient   Patient Care Overview   Progress improving     Goal: Adult Individualization and Mutuality  Outcome: Ongoing (interventions implemented as appropriate)    Goal: Discharge Needs Assessment  Outcome: Ongoing (interventions implemented as appropriate)      Problem: Fall Risk (Adult)  Goal: Identify Related Risk Factors and Signs and Symptoms  Outcome: Ongoing (interventions implemented as appropriate)   02/05/18 0406   Fall Risk   Fall Risk: Related Risk Factors history of falls;gait/mobility problems   Fall Risk: Signs and Symptoms presence of risk factors     Goal: Absence of Falls  Outcome: Ongoing (interventions implemented as appropriate)   02/05/18 0406   Fall Risk (Adult)   Absence of Falls making progress toward outcome       Problem: Orthopaedic Fracture (Adult)  Goal: Signs and Symptoms of Listed Potential Problems Will be Absent or Manageable (Orthopaedic Fracture)  Outcome: Ongoing (interventions implemented as appropriate)   02/05/18 0406   Orthopaedic Fracture   Problems Assessed (Orthopaedic Fracture) all   Problems Present (Orthopaedic Fracture) functional deficit/ self-care deficit

## 2018-02-06 PROCEDURE — 25010000002 HEPARIN (PORCINE) PER 1000 UNITS: Performed by: NURSE PRACTITIONER

## 2018-02-06 PROCEDURE — G0378 HOSPITAL OBSERVATION PER HR: HCPCS

## 2018-02-06 PROCEDURE — 96372 THER/PROPH/DIAG INJ SC/IM: CPT

## 2018-02-06 PROCEDURE — 99212 OFFICE O/P EST SF 10 MIN: CPT | Performed by: ORTHOPAEDIC SURGERY

## 2018-02-06 PROCEDURE — 97116 GAIT TRAINING THERAPY: CPT

## 2018-02-06 PROCEDURE — 97110 THERAPEUTIC EXERCISES: CPT

## 2018-02-06 PROCEDURE — 94799 UNLISTED PULMONARY SVC/PX: CPT

## 2018-02-06 PROCEDURE — 99225 PR SBSQ OBSERVATION CARE/DAY 25 MINUTES: CPT | Performed by: INTERNAL MEDICINE

## 2018-02-06 RX ADMIN — CETIRIZINE HYDROCHLORIDE 10 MG: 10 TABLET, FILM COATED ORAL at 20:04

## 2018-02-06 RX ADMIN — PANTOPRAZOLE SODIUM 40 MG: 40 TABLET, DELAYED RELEASE ORAL at 06:05

## 2018-02-06 RX ADMIN — GABAPENTIN 400 MG: 400 CAPSULE ORAL at 08:12

## 2018-02-06 RX ADMIN — PERPHENAZINE 4 MG: 2 TABLET, FILM COATED ORAL at 08:28

## 2018-02-06 RX ADMIN — BENZTROPINE MESYLATE 0.5 MG: 1 TABLET ORAL at 16:54

## 2018-02-06 RX ADMIN — DOCUSATE SODIUM 100 MG: 100 CAPSULE, LIQUID FILLED ORAL at 20:04

## 2018-02-06 RX ADMIN — HYDROCODONE BITARTRATE AND ACETAMINOPHEN 1 TABLET: 7.5; 325 TABLET ORAL at 20:05

## 2018-02-06 RX ADMIN — HEPARIN SODIUM 5000 UNITS: 5000 INJECTION, SOLUTION INTRAVENOUS; SUBCUTANEOUS at 08:11

## 2018-02-06 RX ADMIN — TRAMADOL HYDROCHLORIDE 50 MG: 50 TABLET, COATED ORAL at 16:56

## 2018-02-06 RX ADMIN — HYDROCODONE BITARTRATE AND ACETAMINOPHEN 1 TABLET: 7.5; 325 TABLET ORAL at 10:10

## 2018-02-06 RX ADMIN — LACTULOSE 20 G: 10 SOLUTION ORAL at 08:13

## 2018-02-06 RX ADMIN — GABAPENTIN 400 MG: 400 CAPSULE ORAL at 20:05

## 2018-02-06 RX ADMIN — LIDOCAINE 1 PATCH: 50 PATCH CUTANEOUS at 08:14

## 2018-02-06 RX ADMIN — OLANZAPINE 10 MG: 5 TABLET, FILM COATED ORAL at 20:04

## 2018-02-06 RX ADMIN — Medication 325 MG: at 08:13

## 2018-02-06 RX ADMIN — HYDROCODONE BITARTRATE AND ACETAMINOPHEN 1 TABLET: 7.5; 325 TABLET ORAL at 15:04

## 2018-02-06 RX ADMIN — TRAZODONE HYDROCHLORIDE 50 MG: 50 TABLET ORAL at 20:05

## 2018-02-06 RX ADMIN — CLOPIDOGREL BISULFATE 75 MG: 75 TABLET ORAL at 08:12

## 2018-02-06 RX ADMIN — BUSPIRONE HYDROCHLORIDE 5 MG: 10 TABLET ORAL at 08:12

## 2018-02-06 RX ADMIN — LACTULOSE 20 G: 10 SOLUTION ORAL at 20:05

## 2018-02-06 RX ADMIN — ASPIRIN 81 MG: 81 TABLET, COATED ORAL at 08:13

## 2018-02-06 RX ADMIN — HYDROCODONE BITARTRATE AND ACETAMINOPHEN 1 TABLET: 7.5; 325 TABLET ORAL at 06:05

## 2018-02-06 RX ADMIN — FLUOXETINE 10 MG: 10 CAPSULE ORAL at 08:13

## 2018-02-06 RX ADMIN — ATORVASTATIN CALCIUM 40 MG: 40 TABLET, FILM COATED ORAL at 20:04

## 2018-02-06 RX ADMIN — POLYETHYLENE GLYCOL 3350 17 G: 17 POWDER, FOR SOLUTION ORAL at 20:05

## 2018-02-06 RX ADMIN — OXYCODONE HYDROCHLORIDE AND ACETAMINOPHEN 500 MG: 500 TABLET ORAL at 08:13

## 2018-02-06 RX ADMIN — AMLODIPINE BESYLATE 10 MG: 10 TABLET ORAL at 08:12

## 2018-02-06 RX ADMIN — HEPARIN SODIUM 5000 UNITS: 5000 INJECTION, SOLUTION INTRAVENOUS; SUBCUTANEOUS at 20:04

## 2018-02-06 RX ADMIN — BUSPIRONE HYDROCHLORIDE 5 MG: 10 TABLET ORAL at 20:04

## 2018-02-06 RX ADMIN — CLONAZEPAM 0.5 MG: 0.5 TABLET ORAL at 09:09

## 2018-02-06 NOTE — PLAN OF CARE
Problem: Patient Care Overview (Adult)  Goal: Plan of Care Review  Outcome: Ongoing (interventions implemented as appropriate)   02/06/18 0502   Coping/Psychosocial Response Interventions   Plan Of Care Reviewed With patient   Patient Care Overview   Progress improving     Goal: Adult Individualization and Mutuality  Outcome: Ongoing (interventions implemented as appropriate)    Goal: Discharge Needs Assessment  Outcome: Ongoing (interventions implemented as appropriate)      Problem: Fall Risk (Adult)  Goal: Identify Related Risk Factors and Signs and Symptoms  Outcome: Ongoing (interventions implemented as appropriate)   02/06/18 0502   Fall Risk   Fall Risk: Related Risk Factors history of falls;culprit medication(s);fear of falling;gait/mobility problems   Fall Risk: Signs and Symptoms presence of risk factors     Goal: Absence of Falls  Outcome: Ongoing (interventions implemented as appropriate)   02/06/18 0502   Fall Risk (Adult)   Absence of Falls making progress toward outcome       Problem: Orthopaedic Fracture (Adult)  Goal: Signs and Symptoms of Listed Potential Problems Will be Absent or Manageable (Orthopaedic Fracture)  Outcome: Ongoing (interventions implemented as appropriate)   02/06/18 0502   Orthopaedic Fracture   Problems Assessed (Orthopaedic Fracture) all   Problems Present (Orthopaedic Fracture) functional deficit/ self-care deficit

## 2018-02-06 NOTE — PLAN OF CARE
Problem: Patient Care Overview (Adult)  Goal: Plan of Care Review  Outcome: Ongoing (interventions implemented as appropriate)   02/06/18 1436   Coping/Psychosocial Response Interventions   Plan Of Care Reviewed With patient   Patient Care Overview   Progress improving   Outcome Evaluation   Outcome Summary/Follow up Plan pt still rt shoulder pain.needs assist oob and for gait.ambulat 400 ft with 2 HH today       Problem: Inpatient Physical Therapy  Goal: Bed Mobility Goal LTG- PT  Outcome: Ongoing (interventions implemented as appropriate)   01/28/18 1159 02/05/18 0948 02/06/18 1436   Bed Mobility PT LTG   Bed Mobility PT LTG, Time to Achieve 4 days --  --    Bed Mobility PT LTG, Activity Type all bed mobility --  --    Bed Mobility PT LTG, [place] Level independent --  --    Bed Mobility PT LTG, Date Goal Reviewed --  02/05/18 --    Bed Mobility PT LTG, Outcome --  --  goal ongoing     Goal: Transfer Training Goal 1 LTG- PT  Outcome: Ongoing (interventions implemented as appropriate)   01/28/18 1159 02/05/18 0948 02/06/18 1436   Transfer Training PT LTG   Transfer Training PT LTG, Time to Achieve 4 days --  --    Transfer Training PT LTG, [place] Level independent --  --    Transfer Training PT LTG, Date Goal Reviewed --  02/05/18 --    Transfer Training PT LTG, Outcome --  --  goal ongoing     Goal: Gait Training Goal LTG- PT  Outcome: Ongoing (interventions implemented as appropriate)   01/28/18 1159 02/05/18 0948 02/06/18 1436   Gait Training PT LTG   Gait Training Goal PT LTG, Date Established 01/28/18 --  --    Gait Training Goal PT LTG, [place] Level conditional independence;supervision required --  --    Gait Training Goal PT LTG, Assist Device other (see comments) --  --    Gait Training Goal PT LTG, Distance to Achieve hha to 500 --  --    Gait Training Goal PT LTG, Date Goal Reviewed --  02/05/18 --    Gait Training Goal PT LTG, Outcome --  --  goal ongoing

## 2018-02-06 NOTE — PROGRESS NOTES
"  SUBJECTIVE  Remains in the hospital awaiting placement.  Pain continues to improve.  No complaints this morning..     OBJECTIVE  Temp (24hrs), Av.2 °F (36.8 °C), Min:97.7 °F (36.5 °C), Max:99.1 °F (37.3 °C)    Blood pressure 134/62, pulse 64, temperature 98.2 °F (36.8 °C), temperature source Oral, resp. rate 18, height 160 cm (63\"), weight 84.8 kg (187 lb), SpO2 95 %.    Lab Results (last 24 hours)     Procedure Component Value Units Date/Time    POC Glucose Once [[id number]]  (Normal) Collected:  1849    Specimen:  Blood Updated:  18     Glucose 91 mg/dL     Narrative:       Meter: [id number] : [id number] [place]            PHYSICAL EXAM  Right upper extremity exam: Focally tender to palpation about the right proximal humerus.  Shoulder range of motion is limited secondary to pain. Sling in place. Intact EPL, FPL, EDC, FDP, FDS, interosseous, wrist flexion, wrist extension, biceps, triceps, deltoid. Sensation intact light touch to median, radial, ulnar, and axillary nerves. Palpable radial pulse.  Dependent edema localized at elbow and forearm resolving ecchymoses       Active Problems:    Immobility    CAD (coronary artery disease)    Diabetes mellitus, type II    HTN (hypertension)    HLD (hyperlipidemia)    Schizophrenia    Anemia    Tremor      PLAN / DISPOSITION:  Right proximal humerus fracture    Continue nonoperative treatment.  Sling for comfort.   Pendulum exercises for range of motion as tolerated  Nonweightbearing right upper extremity  Follow-up with Dr. Guidry or [place] orthopedist in 1-2 weeks for reevaluation.    Igor Guidry MD  18  7:02 AM        "

## 2018-02-06 NOTE — PROGRESS NOTES
[facility] Medicine Services  PROGRESS NOTE    Patient Name: Liane Griffin  : 1950  MRN: [id number]    Date of Admission: 2018  Length of Stay: 0  Primary Care Physician: No Known Provider    Subjective     CC: f/u AMS    HPI:  Sitting up in chair at bedside.  Denies any complaints    Review of Systems  Gen- No fevers, chills  CV- No chest pain, palpitations  Resp- No cough, dyspnea  GI- No N/V/D, abd pain    Otherwise ROS is negative except as mentioned in the HPI.    Objective     Vital Signs:   Temp:  [97.7 °F (36.5 °C)-98.2 °F (36.8 °C)] 98.1 °F (36.7 °C)  Heart Rate:  [64-65] 64  Resp:  [18] 18  BP: (105-151)/(47-99) 150/99        Physical Exam:  Constitutional: No acute distress, awake, alert and conversant.   HENT: NCAT, mucous membranes moist  Respiratory: Anterior lung fields CTAB without wheezes, rales or rhonchi. Normal respiratory effort. On room air.    Cardiovascular: RRR, no murmurs, rubs, or gallops, palpable pedal pulses bilaterally  Gastrointestinal: Positive bowel sounds, soft, nontender, nondistended  Musculoskeletal: R arm with mild edema and anterior shoulder ecchymosis. Immobilized in sling.   Psychiatric: Appropriate affect, cooperative  Neurologic: Oriented to person, place and time today. Interactive and follows commands. Strength symmetric in all extremities, Cranial Nerves grossly intact to confrontation, speech clear and appropriate.   Skin: No rashes    Results Reviewed:  I have personally reviewed current lab, radiology, and data and agree.              Invalid input(s):  ALKPHOS, TROPONININT  Estimated Creatinine Clearance: 68.5 mL/min (by C-G formula based on Cr of 0.7).  No results found for: BNP  No results found for: PHART    Microbiology Results Abnormal     Procedure Component Value - Date/Time    Blood Culture - Blood, [[id number]]  (Normal) Collected:  18 1255    Lab Status:  Final result Specimen:  Blood from Arm, Right Updated:   02/01/18 1316     Blood Culture No growth at 5 days    Blood Culture - Blood, [[id number]]  (Normal) Collected:  01/27/18 1255    Lab Status:  Final result Specimen:  Blood from Arm, Left Updated:  02/01/18 1316     Blood Culture No growth at 5 days    Influenza A & B, RT PCR - Swab, Nasopharynx [[id number]]  (Normal) Collected:  01/28/18 0542    Lab Status:  Final result Specimen:  Swab from Nasopharynx Updated:  01/28/18 1101     Influenza A PCR Not Detected     Influenza B PCR Not Detected    Influenza Antigen, Rapid - Swab, Nasopharynx [[id number]]  (Normal) Collected:  01/27/18 1305    Lab Status:  Final result Specimen:  Swab from Nasopharynx Updated:  01/27/18 1332     Influenza A Ag, EIA Negative     Influenza B Ag, EIA Negative        Imaging Results (last 24 hours)     ** No results found for the last 24 hours. **        I have reviewed the medications.    Assessment / Plan     Hospital Problem List     Immobility    CAD (coronary artery disease) (Chronic)    Diabetes mellitus, type II (Chronic)    HTN (hypertension) (Chronic)    HLD (hyperlipidemia) (Chronic)    Schizophrenia (Chronic)    Anemia    Tremor        Brief Hospital Course to date:  Liane Griffin is a 67 y.o. female with PMH significant for HTN, hyperlipidemia, CAD, non-insulin dependent DMII and schizophrenia. She sustained a humeral head fracture on 1/19/18 and was treated at [facility] twice. No surgical intervention recommended. She presented to [facility] ED on 1/27/18 secondary to R arm and shoulder pain. When she was to be discharged from the ED, [facility] refused to [facility] her back, stating that she needed more care than they could provide.     Assessment & Plan:  - Appreciate ortho evaluation. Recommend non-operative management and pain control. Non-weight bearing to RUE. Follow up with Dr. Guidry or her prior orthopedist @ [place] in 2 weeks (approx. 2/12)  - Baseline mental status is unclear. She continues to be intermittently  altered. Continue home psych medications.   - Anemia studies consistent with iron deficiency anemia. Continue PO iron replacement.   - Using minimal SSI. Stopped accuchecks. Consider discontinuing home metformin at d/c or reducing dosage.  - Increase bowel regimen for constipation. Encouraged OOB for toileting and increasing mobility as tolerated.     Case management following. Awaiting Medicaid pending bed.  Per 2/2 CM note, [facility] would need to do an on-site evaluation in order for her to return.      DVT Prophylaxis:  [facility]  CODE STATUS: Full Code    Disposition: I expect the patient to be discharged when bed available, CM working on difficult placement

## 2018-02-06 NOTE — THERAPY TREATMENT NOTE
Acute Care - Physical Therapy Treatment Note  [facility]     Patient Name: Liane Griffin  : 1950  MRN: [id number]  Today's Date: 2018  Onset of Illness/Injury or Date of Surgery Date: 18  Date of Referral to PT: 18  Referring Physician: JOE Mckeon    Admit Date: 2018    Visit Dx:    ICD-10-CM ICD-9-CM   1. Torticollis, acute M43.6 723.5   2. 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 Physical deconditioning R53.81 799.3   4. Impaired mobility and ADLs Z74.09 799.89   5. Impaired functional mobility, balance, gait, and endurance Z74.09 V49.89     Patient Active Problem List   Diagnosis   • Immobility   • CAD (coronary artery disease)   • Diabetes mellitus, type II   • HTN (hypertension)   • HLD (hyperlipidemia)   • Schizophrenia   • Anemia   • Tremor               Adult Rehabilitation Note       18 1330 18 0917 18 0854    Rehab Assessment/Intervention    Discipline physical therapy assistant  -UD occupational therapist  -SHARON physical therapy assistant  -AS    Document Type therapy note (daily note)  -UD therapy note (daily note)  -SHARON therapy note (daily note)  -AS    Subjective Information agree to therapy;complains of;weakness;pain  -UD agree to therapy;complains of;pain  -SHARON agree to therapy;complains of;pain  -AS    Patient Effort, Rehab Treatment good  -UD good  -SHARON good  -AS    Symptoms Noted During/After Treatment fatigue  -UD none  -SHARON increased pain  -AS    Precautions/Limitations  fall precautions   sling for comfort, RUE NWB,   -SHARON fall precautions;other (see comments)   sling for comfort, NWB R UE  -AS    Specific Treatment Considerations sling rt shoulder  -UD humeral fx RUE, no sh. ROM  -SHARON     Recorded by [UD] Loli León PTA [SHARON] Myrtle Williamson, MARLON [AS] Katerine Laguna PTA    Vital Signs    Posttreatment Heart Rate (beats/min)  62  -SHARON     Pre SpO2 (%)  --   off other  telemetry  -SHARON     O2 Delivery Post Treatment room air  -UD      Pre Patient Position Supine  -UD Sitting  -SHARON     Intra Patient Position Standing  -UD Sitting  -SHARON     Post Patient Position Supine  -UD Sitting  -SHARON     Recorded by [UD] Loli León PTA [SHARON] Myrtle Williamson, OT     Pain Assessment    Pain Assessment Williamson-Mcallister FACES  -UD 0-10  -SHARON 0-10  -AS    Williamson-Mcallister FACES Pain Rating 2  -UD      Pain Score 2  -UD 8  -SHARON 7  -AS    Post Pain Score 2  -UD 7  -SHARON 7  -AS    Pain Type Acute pain  -UD Acute pain  -SHARON Acute pain  -AS    Pain Location Shoulder  -UD Shoulder   per pt. more specifically in her Right shoulder blade.  -SHARON Shoulder  -AS    Pain Orientation Right  -UD Right  -SHARON Right  -AS    Patient's Stated Pain Goal   No pain  -AS    Pain Intervention(s) Repositioned  -UD Ambulation/increased activity  -SHARON Repositioned;Ambulation/increased activity  -AS    Response to Interventions  improved.  -SHARON tolerated  -AS    Recorded by [UD] Loli León PTA [SHARON] Myrtle Williamson, OT [AS] Katerine Laguna PTA    Cognitive Assessment/Intervention    Current Cognitive/Communication Assessment  impaired   slow processing with ther. exer. with max cues needed.  -SHARON functional  -AS    Orientation Status  oriented to;person   other not reassessed.  -SHARON oriented to;person;place  -AS    Follows Commands/Answers Questions  100% of the time;able to follow single-step instructions;needs cueing;needs increased time;needs repetition;75% of the time  -SHARON 100% of the time;able to follow single-step instructions  -AS    Personal Safety  mild impairment  -SHARON mild impairment;decreased awareness, need for assist  -AS    Personal Safety Interventions  fall prevention program maintained;gait belt;nonskid shoes/slippers when out of bed   No R sh ROM or WB  -SHARON fall prevention program maintained;gait belt;nonskid shoes/slippers when out of bed;other (see comments)   exit alarm  -AS    Recorded by  [SHARON] Myrtle Williamson, OT [AS] Katerine  Mounika Laguna PTA    Bed Mobility, Assessment/Treatment    Bed Mob, Supine to Sit, [place] moderate assist (50% patient effort);2 person assist required  -UD  verbal cues required;minimum assist (75% patient effort)  -AS    Bed Mob, Sit to Supine, [place] moderate assist (50% patient effort);2 person assist required  -UD      Bed Mobility, Comment  Pt. up in chair post PT when OT arrived.  -SHARON assist at trunk to reach EOB due to NWB R UE  -AS    Recorded by [UD] Loli León PTA [SHARON] Myrtle Williamson, OT [AS] Katerine Laguna PTA    Transfer Assessment/Treatment    Transfers, Sit-Stand [place] minimum assist (75% patient effort);2 person assist required  -UD  verbal cues required;minimum assist (75% patient effort)  -AS    Transfers, Stand-Sit [place] minimum assist (75% patient effort);2 person assist required  -UD  verbal cues required;minimum assist (75% patient effort)  -AS    Transfers, Sit-Stand-Sit, Assist Device   other (see comments)   B UE support on left  -AS    Transfer, Impairments   impaired balance;strength decreased;pain  -AS    Transfer, Comment  Pt. declined up again due to recent PT tx   -SHARON patient unsteady on initial stand but given a few seconds patient able to readjust  -AS    Recorded by [UD] Loli León PTA [SHARON] Myrtle Williamson, OT [AS] Katerine Laguna PTA    Gait Assessment/Treatment    Gait, [place] Level contact guard assist;2 person assist required  -UD  verbal cues required;minimum assist (75% patient effort)  -AS    Gait, Assistive Device   other (see comments)   B UE on left  -AS    Gait, Distance (Feet) 400   goes too fast   -UD  200  -AS    Gait, Gait Deviations   antalgic;isaiah decreased;forward flexed posture;step length decreased  -AS    Gait, Safety Issues --   leans forward  -UD  weight-shifting ability decreased;step length decreased  -AS    Gait, Impairments strength decreased;impaired balance  -UD  strength decreased;impaired balance;pain   -AS    Gait, Comment --   cues for posture  -UD  patient tolerated ambulation in hallway but had complaints of right shoulder pain  -AS    Recorded by [UD] Loli León PTA  [AS] Katerine Laguna PTA    Functional Mobility    Functional Mobility- Comment  Pt. declined.  Just up in avalos with PT.  -SHARON     Recorded by  [SHARON] Myrtle Williamson OT     Balance Skills Training    Sitting-Level of Assistance Close supervision  -UD      Recorded by [UD] Loli León PTA      Therapy Exercises    Bilateral Lower Extremities AROM:;10 reps;sitting  -UD  AROM:;10 reps;sitting;ankle pumps/circles;LAQ  -AS    Right Upper Extremity  AAROM:;AROM:;10 reps;sitting;elbow flexion/extension;hand pumps;pronation/supination;shoulder protraction/retraction;shoulder rolls/shrugs   wrist F/E, finger abd/add.  Neck ROM  -SHARON     RUE Resistance  other (comment)   yellow foam block, yellow and pink issued.  -SHARON     Left Upper Extremity  AROM:;10 reps;15 reps;sitting;elbow flexion/extension;shoulder abduction/adduction;shoulder extension/flexion;shoulder horizontal abd/add;shoulder protraction/retraction;shoulder rolls/shrugs  -SHARON     LUE Resistance  manual resistance- minimal   biceps/triceps,  L hand gripper  -SHARON     Recorded by [UD] Loli León PTA [SHARON] Myrtle Williamson, OT [AS] Katerine Laguna PTA    Positioning and Restraints    Pre-Treatment Position in bed  -UD sitting in chair/recliner  -SHARON in bed  -AS    Post Treatment Position bed  -UD chair  -SHARON chair  -AS    In Bed notified nsg;supine;call light within reach;exit alarm on;side rails up x2  -UD      In Chair  reclined;call light within reach;encouraged to call for assist;exit alarm on;RUE elevated   in sling, LUE repositioned for increased comfort  -SHARON reclined;call light within reach;encouraged to call for assist;exit alarm on;waffle cushion  -AS    Recorded by [UD] Loli León PTA [SHARON] Myrtle Williamson, OT [AS] Katerine Laguna PTA      User Key  (r) = Recorded By, (t) =  Taken By, (c) = Cosigned By    Initials Name Effective Dates    SHARON Myrtle Luke Clay, OT 06/22/15 -     UD Loli León, PTA 06/22/15 -     AS Katerine Ribera Jase, PTA 06/22/15 -                 IP PT Goals       02/06/18 1436 02/05/18 0948 02/03/18 1523    Bed Mobility PT LTG    Bed Mobility PT LTG, Date Goal Reviewed  02/05/18  -AS 02/03/18  -LM    Bed Mobility PT LTG, Outcome goal ongoing  -UD goal ongoing  -AS goal ongoing  -LM    Transfer Training PT LTG    Transfer Training PT  LTG, Date Goal Reviewed  02/05/18  -AS 02/03/18  -LM    Transfer Training PT LTG, Outcome goal ongoing  -UD goal ongoing  -AS goal ongoing  -LM    Gait Training PT LTG    Gait Training Goal PT LTG, Date Goal Reviewed  02/05/18  -AS 02/03/18  -LM    Gait Training Goal PT LTG, Outcome goal ongoing  -UD goal ongoing  -AS goal ongoing  -LM      02/01/18 1142 01/30/18 1308 01/29/18 0931    Bed Mobility PT LTG    Bed Mobility PT LTG, Date Goal Reviewed   01/29/18  -AS    Bed Mobility PT LTG, Outcome goal ongoing  -UD goal ongoing  -KR goal ongoing  -AS    Transfer Training PT LTG    Transfer Training PT  LTG, Date Goal Reviewed   01/29/18  -AS    Transfer Training PT LTG, Outcome goal ongoing  -UD goal ongoing  -KR goal ongoing  -AS    Gait Training PT LTG    Gait Training Goal PT LTG, Date Goal Reviewed   01/29/18  -AS    Gait Training Goal PT LTG, Outcome goal ongoing  -UD goal ongoing  -KR goal ongoing  -AS      01/28/18 1159          Bed Mobility PT LTG    Bed Mobility PT LTG, Time to Achieve 4 days  -CT      Bed Mobility PT LTG, Activity Type all bed mobility  -CT      Bed Mobility PT LTG, [place] Level independent  -CT      Transfer Training PT LTG    Transfer Training PT LTG, Time to Achieve 4 days  -CT      Transfer Training PT LTG, [place] Level independent  -CT      Gait Training PT LTG    Gait Training Goal PT LTG, Date Established 01/28/18  -CT      Gait Training Goal PT LTG, [place] Level conditional  independence;supervision required  -CT      Gait Training Goal PT LTG, Assist Device other (see comments)  -CT      Gait Training Goal PT LTG, Distance to Achieve hha to 500  -CT        User Key  (r) = Recorded By, (t) = Taken By, (c) = Cosigned By    Initials Name Provider Type    UD Loli León, PTA Physical Therapy Assistant    AS Katerine Laguna, PTA Physical Therapy Assistant    CT Chris Che, PT Physical Therapist    KR Ava Aly, PT Physical Therapist    LM Mary Smith, PT Physical Therapist          Physical Therapy Education     Title: PT OT SLP Therapies (Active)     Topic: Physical Therapy (Done)     Point: Mobility training (Done)    Learning Progress Summary    Learner Readiness Method Response Comment Documented by Status   Patient JAN Bui NR  UD 02/06/18 1435 Done    Acceptance E NR  AS 02/05/18 0947 Active    Acceptance E,D NR Reviewed benefits of activity, WB status, correct gait mechanics, safety with mobility.  02/03/18 1523 Active    JAN Bui NR  UD 02/01/18 1142 Done    Acceptance E VU  KS 02/01/18 0934 Done    Acceptance E NR  KR 01/30/18 1308 Active    Acceptance E NR  AS 01/29/18 0931 Active    Acceptance E NR  CT 01/28/18 1201 Active               Point: Home exercise program (Done)    Learning Progress Summary    Learner Readiness Method Response Comment Documented by Status   Patient JAN Bui NR  UD 02/06/18 1435 Done    Acceptance E NR  AS 02/05/18 0947 Active    Acceptance E,D NR Reviewed benefits of activity, WB status, correct gait mechanics, safety with mobility.  02/03/18 1523 Active    JAN Bui NR  UD 02/01/18 1142 Done    Acceptance E VU  KS 02/01/18 0934 Done    Acceptance E NR  AS 01/29/18 0931 Active    Acceptance E NR  CT 01/28/18 1201 Active               Point: Body mechanics (Done)    Learning Progress Summary    Learner Readiness Method Response Comment Documented by Status   Patient JAN Bui,NR  UD 02/06/18 1435 Done     Acceptance E NR  AS 02/05/18 0947 Active    Acceptance E,D NR Reviewed benefits of activity, WB status, correct gait mechanics, safety with mobility.  02/03/18 1523 Active    JAN Bui,NR  UD 02/01/18 1142 Done    Acceptance E VU  KS 02/01/18 0934 Done    Acceptance E NR  KR 01/30/18 1308 Active    Acceptance E NR  AS 01/29/18 0931 Active    Acceptance E NR  CT 01/28/18 1201 Active               Point: Precautions (Done)    Learning Progress Summary    Learner Readiness Method Response Comment Documented by Status   Patient JAN Bui,NR   02/06/18 1435 Done    Acceptance E NR  AS 02/05/18 0947 Active    Acceptance E,D NR Reviewed benefits of activity, WB status, correct gait mechanics, safety with mobility.  02/03/18 1523 Active    JAN Bui,NR   02/01/18 1142 Done    Acceptance E VU  KS 02/01/18 0934 Done    Acceptance E NR  KR 01/30/18 1308 Active    Acceptance E NR  AS 01/29/18 0931 Active    Acceptance E NR  CT 01/28/18 1201 Active                      User Key     Initials Effective Dates Name Provider Type Discipline     06/22/15 -  Loli León, PTA Physical Therapy Assistant PT    AS 06/22/15 -  Katerine Laguna, PTA Physical Therapy Assistant PT    CT 06/19/15 -  Chris Che, PT Physical Therapist PT    KS 09/18/16 -  Jeni Fletcher RN Registered Nurse Nurse     09/25/17 -  Ava Aly, PT Physical Therapist PT     06/09/17 -  Mary Smith, PT Physical Therapist PT                    PT Recommendation and Plan  Planned Therapy Interventions: balance training, bed mobility training, gait training, home exercise program, strengthening  Plan of Care Review  Plan Of Care Reviewed With: patient  Progress: improving  Outcome Summary/Follow up Plan: pt still rt shoulder pain.needs assist oob and for gait.ambulat 400 ft with 2 HH today          Outcome Measures       02/06/18 1330 02/05/18 0917 02/05/18 0854    How much help from another person do you currently need...     Turning from your back to your side while in flat bed without using bedrails? 2  -UD  2  -AS    Moving from lying on back to sitting on the side of a flat bed without bedrails? 2  -UD  2  -AS    Moving to and from a bed to a chair (including a wheelchair)? 3  -UD  3  -AS    Standing up from a chair using your arms (e.g., wheelchair, bedside chair)? 3  -UD  3  -AS    Climbing 3-5 steps with a railing? 2  -UD  2  -AS    To walk in hospital room? 3  -UD  3  -AS    AM-PAC 6 Clicks Score 15  -UD  15  -AS    How much help from another is currently needed...    Putting on and taking off regular lower body clothing?  2  -SHARON     Bathing (including washing, rinsing, and drying)  2  -SHARON     Toileting (which includes using toilet bed pan or urinal)  2  -SHARON     Putting on and taking off regular upper body clothing  2  -SHARON     Taking care of personal grooming (such as brushing teeth)  2  -SHARON     Eating meals  3  -SHARON     Score  13  -SHARON     Functional Assessment    Outcome Measure Options  AM-PAC 6 Clicks Daily Activity (OT)  -SHARON AM-PAC 6 Clicks Basic Mobility (PT)  -AS      User Key  (r) = Recorded By, (t) = Taken By, (c) = Cosigned By    Initials Name Provider Type    SHARON Myrtle Williamson, OT Occupational Therapist    NATHALIA León PTA Physical Therapy Assistant    AS Katerine Laguna PTA Physical Therapy Assistant           Time Calculation:         PT Charges       02/06/18 1438          Time Calculation    PT Received On 02/06/18  -      PT Goal Re-Cert Due Date 02/07/18  -      Time Calculation- PT    Total Timed Code Minutes- PT 24 minute(s)  -UD        User Key  (r) = Recorded By, (t) = Taken By, (c) = Cosigned By    Initials Name Provider Type    NATHALIA León PTA Physical Therapy Assistant          Therapy Charges for Today     Code Description Service Date Service Provider Modifiers Qty    86664492750 HC GAIT TRAINING EA 15 MIN 2/6/2018 Loli León PTA GP 1    32556423968 HC PT THER PROC EA 15 MIN 2/6/2018  Loli León, PTA GP 1    26394879410 HC PT THER SUPP EA 15 MIN 2/6/2018 Loli León, PTA GP 1          PT G-Codes  PT Professional Judgement Used?: Yes  Outcome Measure Options: AM-PAC 6 Clicks Daily Activity (OT)  Score: 15  Functional Limitation: Mobility: Walking and moving around  Mobility: Walking and Moving Around Current Status (): At least 40 percent but less than 60 percent impaired, limited or restricted  Mobility: Walking and Moving Around Goal Status (): At least 20 percent but less than 40 percent impaired, limited or restricted    Loli León PTA  2/6/2018

## 2018-02-07 PROBLEM — S42.291A FRACTURE OF HUMERAL HEAD, RIGHT, CLOSED: Status: ACTIVE | Noted: 2018-02-07

## 2018-02-07 PROCEDURE — 94799 UNLISTED PULMONARY SVC/PX: CPT

## 2018-02-07 PROCEDURE — 96372 THER/PROPH/DIAG INJ SC/IM: CPT

## 2018-02-07 PROCEDURE — 25010000002 HEPARIN (PORCINE) PER 1000 UNITS: Performed by: NURSE PRACTITIONER

## 2018-02-07 PROCEDURE — 99225 PR SBSQ OBSERVATION CARE/DAY 25 MINUTES: CPT | Performed by: HOSPITALIST

## 2018-02-07 PROCEDURE — G0378 HOSPITAL OBSERVATION PER HR: HCPCS

## 2018-02-07 PROCEDURE — 97116 GAIT TRAINING THERAPY: CPT

## 2018-02-07 PROCEDURE — 97530 THERAPEUTIC ACTIVITIES: CPT

## 2018-02-07 RX ADMIN — BUSPIRONE HYDROCHLORIDE 5 MG: 10 TABLET ORAL at 21:07

## 2018-02-07 RX ADMIN — GABAPENTIN 400 MG: 400 CAPSULE ORAL at 21:08

## 2018-02-07 RX ADMIN — CLONAZEPAM 0.5 MG: 0.5 TABLET ORAL at 00:00

## 2018-02-07 RX ADMIN — BISACODYL 5 MG: 5 TABLET, COATED ORAL at 05:50

## 2018-02-07 RX ADMIN — TRAZODONE HYDROCHLORIDE 50 MG: 50 TABLET ORAL at 21:08

## 2018-02-07 RX ADMIN — GABAPENTIN 400 MG: 400 CAPSULE ORAL at 08:03

## 2018-02-07 RX ADMIN — ATORVASTATIN CALCIUM 40 MG: 40 TABLET, FILM COATED ORAL at 21:08

## 2018-02-07 RX ADMIN — HYDROCODONE BITARTRATE AND ACETAMINOPHEN 1 TABLET: 7.5; 325 TABLET ORAL at 05:50

## 2018-02-07 RX ADMIN — HYDROCODONE BITARTRATE AND ACETAMINOPHEN 1 TABLET: 7.5; 325 TABLET ORAL at 00:00

## 2018-02-07 RX ADMIN — OXYCODONE HYDROCHLORIDE AND ACETAMINOPHEN 500 MG: 500 TABLET ORAL at 08:01

## 2018-02-07 RX ADMIN — HEPARIN SODIUM 5000 UNITS: 5000 INJECTION, SOLUTION INTRAVENOUS; SUBCUTANEOUS at 07:57

## 2018-02-07 RX ADMIN — BENZTROPINE MESYLATE 0.5 MG: 1 TABLET ORAL at 17:14

## 2018-02-07 RX ADMIN — BISACODYL 10 MG: 10 SUPPOSITORY RECTAL at 05:50

## 2018-02-07 RX ADMIN — PERPHENAZINE 4 MG: 2 TABLET, FILM COATED ORAL at 08:03

## 2018-02-07 RX ADMIN — PANTOPRAZOLE SODIUM 40 MG: 40 TABLET, DELAYED RELEASE ORAL at 05:50

## 2018-02-07 RX ADMIN — CETIRIZINE HYDROCHLORIDE 10 MG: 10 TABLET, FILM COATED ORAL at 21:08

## 2018-02-07 RX ADMIN — ASPIRIN 81 MG: 81 TABLET, COATED ORAL at 08:03

## 2018-02-07 RX ADMIN — HYDROCODONE BITARTRATE AND ACETAMINOPHEN 1 TABLET: 7.5; 325 TABLET ORAL at 17:14

## 2018-02-07 RX ADMIN — HEPARIN SODIUM 5000 UNITS: 5000 INJECTION, SOLUTION INTRAVENOUS; SUBCUTANEOUS at 21:08

## 2018-02-07 RX ADMIN — LACTULOSE 20 G: 10 SOLUTION ORAL at 05:50

## 2018-02-07 RX ADMIN — BUSPIRONE HYDROCHLORIDE 5 MG: 10 TABLET ORAL at 08:01

## 2018-02-07 RX ADMIN — CLONAZEPAM 0.5 MG: 0.5 TABLET ORAL at 21:08

## 2018-02-07 RX ADMIN — CLOPIDOGREL BISULFATE 75 MG: 75 TABLET ORAL at 08:03

## 2018-02-07 RX ADMIN — FLUOXETINE 10 MG: 10 CAPSULE ORAL at 08:03

## 2018-02-07 RX ADMIN — LIDOCAINE 1 PATCH: 50 PATCH CUTANEOUS at 07:57

## 2018-02-07 RX ADMIN — OLANZAPINE 10 MG: 5 TABLET, FILM COATED ORAL at 21:08

## 2018-02-07 RX ADMIN — HYDROCODONE BITARTRATE AND ACETAMINOPHEN 1 TABLET: 7.5; 325 TABLET ORAL at 21:25

## 2018-02-07 RX ADMIN — Medication 325 MG: at 08:03

## 2018-02-07 NOTE — PLAN OF CARE
Problem: Patient Care Overview (Adult)  Goal: Plan of Care Review  Outcome: Ongoing (interventions implemented as appropriate)   02/07/18 1153   Coping/Psychosocial Response Interventions   Plan Of Care Reviewed With patient   Outcome Evaluation   Outcome Summary/Follow up Plan Pt alert and motivated to work with therapy. Pain R shoulder limits ADL participation. HEP completed per MD orders. Education reinforced for R Shoulder precautions, postioning and care. OT to follow. D/C plan is rehab.       Problem: Inpatient Occupational Therapy  Goal: Bed Mobility Goal LTG- OT  Outcome: Ongoing (interventions implemented as appropriate)   01/29/18 1503 02/07/18 1153   Bed Mobility OT LTG   Bed Mobility OT LTG, Time to Achieve 5 days --    Bed Mobility OT LTG, Activity Type supine to sit/sit to supine --    Bed Mobility OT LTG, [place] Level contact guard assist --    Bed Mobility OT LTG, Assist Device bed rails --    Bed Mobility OT LTG, Outcome --  goal ongoing  (Mod A)     Goal: Transfer Training Goal 1 LTG- OT  Outcome: Ongoing (interventions implemented as appropriate)   01/29/18 1503 02/07/18 1153   Transfer Training OT LTG   Transfer Training OT LTG, Time to Achieve 5 days --    Transfer Training OT LTG, Activity Type bed to chair /chair to bed;toilet --    Transfer Training OT LTG, [place] Level supervision required --    Transfer Training OT LTG, Assist Device cane, straight --    Transfer Training OT LTG, Additional Goal SPC if needed --    Transfer Training OT LTG, Outcome --  goal ongoing     Goal: UB Dressing Goal LTG- OT  Outcome: Ongoing (interventions implemented as appropriate)   01/29/18 1503 02/07/18 1153   UB Dressing OT LTG   UB Dressing Goal OT LTG, Time to Achieve 5 days --    UB Dressing Goal OT LTG, [place] Level minimum assist (75% patient effort) --    UB Dressing Goal OT LTG, Additional Goal with silverio technique --    UB Dressing Goal OT LTG, Outcome --  goal ongoing  (Max A )      Goal: Strength Goal LTG- OT  Outcome: Ongoing (interventions implemented as appropriate)   01/31/18 1417 02/07/18 1153   Strength OT LTG   Strength Goal OT LTG, Date Established 01/31/18 --    Strength Goal OT LTG, Time to Achieve 1 wk --    Strength Goal OT LTG, Measure to Achieve Pt. will participated AAROM/AROM BUE (R elbow to hand only) 10-15 reps each session to support ADL and transfer indep. --    Strength Goal OT LTG, Outcome --  goal partially met  (met x10 reps)

## 2018-02-07 NOTE — PLAN OF CARE
Problem: Patient Care Overview (Adult)  Goal: Plan of Care Review  Outcome: Ongoing (interventions implemented as appropriate)   02/07/18 1325   Coping/Psychosocial Response Interventions   Plan Of Care Reviewed With patient   Patient Care Overview   Progress improving   Outcome Evaluation   Outcome Summary/Follow up Plan patient needs assist for all activity secondary to decreased use of UE due to NWB status on left, pain and weakness. Verbal cues to slow down during moblity secondary to impaired balance.       Problem: Inpatient Physical Therapy  Goal: Bed Mobility Goal LTG- PT  Outcome: Ongoing (interventions implemented as appropriate)   01/28/18 1159 02/07/18 1325   Bed Mobility PT LTG   Bed Mobility PT LTG, Time to Achieve 4 days --    Bed Mobility PT LTG, Activity Type all bed mobility --    Bed Mobility PT LTG, [place] Level independent --    Bed Mobility PT LTG, Date Goal Reviewed --  02/07/18   Bed Mobility PT LTG, Outcome --  goal ongoing     Goal: Transfer Training Goal 1 LTG- PT  Outcome: Ongoing (interventions implemented as appropriate)   01/28/18 1159 02/07/18 1325   Transfer Training PT LTG   Transfer Training PT LTG, Time to Achieve 4 days --    Transfer Training PT LTG, [place] Level independent --    Transfer Training PT LTG, Date Goal Reviewed --  02/07/18   Transfer Training PT LTG, Outcome --  goal ongoing     Goal: Gait Training Goal LTG- PT  Outcome: Ongoing (interventions implemented as appropriate)   01/28/18 1159 02/07/18 1325   Gait Training PT LTG   Gait Training Goal PT LTG, Date Established 01/28/18 --    Gait Training Goal PT LTG, [place] Level conditional independence;supervision required --    Gait Training Goal PT LTG, Assist Device other (see comments) --    Gait Training Goal PT LTG, Distance to Achieve hha to 500 --    Gait Training Goal PT LTG, Date Goal Reviewed --  02/07/18   Gait Training Goal PT LTG, Outcome --  goal ongoing

## 2018-02-07 NOTE — PROGRESS NOTES
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 Says she has some pain in her arm when working with them, but at rest it feels okay. No other complaints.     Review of Systems  Gen- No fevers, chills  CV- No chest pain, palpitations  Resp- No cough, dyspnea  GI- No N/V/D, abd pain    Otherwise ROS is negative except as mentioned in the HPI.    Objective     Vital Signs:   Temp:  [97.4 °F (36.3 °C)-97.9 °F (36.6 °C)] 97.7 °F (36.5 °C)  Heart Rate:  [57-75] 75  Resp:  [16-18] 16  BP: ()/(55-82) 96/62        Physical Exam:  Constitutional: No acute distress, awake, alert and conversant.   HENT: NCAT, mucous membranes moist  Respiratory: CTAB without wheezes, no rales, normal effort  Cardiovascular: RRR, no murmurs, rubs, or gallops  Gastrointestinal: Positive bowel sounds, soft, nontender, nondistended  Musculoskeletal: R arm immobilized in sling  Psychiatric: Appropriate affect, cooperative  Neurologic: A&Ox3, interactive, follows commands;  Strength symmetric in all extremities  Skin: No rashes    Results Reviewed:  I have personally reviewed current lab, radiology, and data and agree.              Invalid input(s):  ALKPHOS, TROPONININT  Estimated Creatinine Clearance: 68.5 mL/min (by C-G formula based on Cr of 0.7).  No results found for: BNP  No results found for: PHART    Microbiology Results Abnormal     Procedure Component Value - Date/Time    Blood Culture - Blood, [[id number]]  (Normal) Collected:  18 1255    Lab Status:  Final result Specimen:  Blood from Arm, Right Updated:  18 1316     Blood Culture No growth at 5 days    Blood Culture - Blood, [[id number]]  (Normal) Collected:  18 1255    Lab Status:  Final result Specimen:   Blood from Arm, Left Updated:  02/01/18 1316     Blood Culture No growth at 5 days    Influenza A & B, RT PCR - Swab, Nasopharynx [[id number]]  (Normal) Collected:  01/28/18 0542    Lab Status:  Final result Specimen:  Swab from Nasopharynx Updated:  01/28/18 1101     Influenza A PCR Not Detected     Influenza B PCR Not Detected    Influenza Antigen, Rapid - Swab, Nasopharynx [[id number]]  (Normal) Collected:  01/27/18 1305    Lab Status:  Final result Specimen:  Swab from Nasopharynx Updated:  01/27/18 1332     Influenza A Ag, EIA Negative     Influenza B Ag, EIA Negative        Imaging Results (last 24 hours)     ** No results found for the last 24 hours. **        I have reviewed the medications.    Assessment / Plan     Hospital Problem List     Immobility    CAD (coronary artery disease) (Chronic)    Diabetes mellitus, type II (Chronic)    HTN (hypertension) (Chronic)    HLD (hyperlipidemia) (Chronic)    Schizophrenia (Chronic)    Anemia    Tremor    Recent fracture of right humeral head        Brief Hospital Course to date:  Liane Griffin is a 67 y.o. female with PMH significant for HTN, hyperlipidemia, CAD, non-insulin dependent DMII and schizophrenia. She sustained a humeral head fracture on 1/19/18 and was treated at [facility]. No surgical intervention recommended. She presented to [facility] ED on 1/27/18 secondary to R arm and shoulder pain. When she was to be discharged from the ED, [facility] refused to accept her back, stating that she needed more care than they could provide.     Assessment & Plan:  - Appreciate ortho evaluation. Recommend non-operative management and pain control. Non-weight bearing to RUE. Follow up with Dr. Guidry or her prior orthopedist @ [place] in 2 weeks (approx. 2/12)  - Baseline mental status is unclear. She continues to be intermittently altered. Continue home psych medications. Better today.  - Anemia studies consistent with iron deficiency anemia. Continue PO  iron replacement.   - Using minimal SSI. Stopped accuchecks. Consider discontinuing home metformin at d/c or reducing dosage.    Case management following. Awaiting Medicaid pending bed. [facility] to accept once insurance approval obtained.      DVT Prophylaxis:  [facility]    CODE STATUS: Full Code    Disposition: I expect the patient to be discharged to rehab when bed available ~1 day

## 2018-02-07 NOTE — THERAPY TREATMENT NOTE
Acute Care - Occupational Therapy Treatment Note  [facility]     Patient Name: Liane Griffin  : 1950  MRN: [id number]  Today's Date: 2018  Onset of Illness/Injury or Date of Surgery Date: 18  Date of Referral to OT: 18  Referring Physician: JOE Mckeon      Admit Date: 2018    Visit Dx:     ICD-10-CM ICD-9-CM   1. Torticollis, acute M43.6 723.5   2. 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 Physical deconditioning R53.81 799.3   4. Impaired mobility and ADLs Z74.09 799.89   5. Impaired functional mobility, balance, gait, and endurance Z74.09 V49.89     Patient Active Problem List   Diagnosis   • Immobility   • CAD (coronary artery disease)   • Diabetes mellitus, type II   • HTN (hypertension)   • HLD (hyperlipidemia)   • Schizophrenia   • Anemia   • Tremor   • Recent fracture of right humeral head             Adult Rehabilitation Note       18 1110 18 1330 18 0917    Rehab Assessment/Intervention    Discipline occupational therapist  -TB physical therapy assistant  -UD occupational therapist  -SHARON    Document Type therapy note (daily note)  -TB therapy note (daily note)  -UD therapy note (daily note)  -SHARON    Subjective Information agree to therapy;complains of;pain  -TB agree to therapy;complains of;weakness;pain  -UD agree to therapy;complains of;pain  -SHARON    Patient Effort, Rehab Treatment good  -TB good  -UD good  -SHARON    Symptoms Noted During/After Treatment increased pain  -TB fatigue  -UD none  -SHARON    Precautions/Limitations fall precautions;shoulder precautions;other (see comments);non-weight bearing status   Non-op R UE Fx for sling for comfort; NWB R UE  -TB  fall precautions   sling for comfort, RUE NWB,   -SHARON    Specific Treatment Considerations R Sling for comfort/protection; NWB R UE  -TB sling rt shoulder  -UD humeral fx RUE, no sh. ROM  -SHARON    Patient Response to Treatment  Ortho progress note dated 2/6/2018 added Pendulum exercises as tolerated  -TB      Recorded by [TB] Doris Marsh, MARLON [UD] Loli León PTA [SHARON] Myrtle Williamson OT    Vital Signs    Posttreatment Heart Rate (beats/min)   62  -SHARON    Pre SpO2 (%)   --   off other telemetry  -SHARON    O2 Delivery Post Treatment room air  -TB room air  -UD     Pre Patient Position Supine  -TB Supine  -UD Sitting  -SHARON    Intra Patient Position Sitting  -TB Standing  -UD Sitting  -SHARON    Post Patient Position Supine  -TB Supine  -UD Sitting  -SHARON    Recorded by [TB] Doris Marsh, OT [UD] Lloi León PTA [SHARON] Myrtle Williamson OT    Pain Assessment    Pain Assessment Williamson-Mcallister FACES  -TB Williamson-Baker FACES  -UD 0-10  -SHARON    Williamson-Mcallister FACES Pain Rating 4  -TB 2  -UD     Pain Score  2  -UD 8  -SHARON    Post Pain Score 2  -TB 2  -UD 7  -SHARON    Pain Type Acute pain  -TB Acute pain  -UD Acute pain  -SHARON    Pain Location Shoulder  -TB Shoulder  -UD Shoulder   per pt. more specifically in her Right shoulder blade.  -SHARON    Pain Orientation Right  -TB Right  -UD Right  -SHARON    Pain Radiating Towards elbow  -TB      Pain Intervention(s) Ambulation/increased activity;Repositioned   HEP  -TB Repositioned  -UD Ambulation/increased activity  -SHARON    Response to Interventions Pt tolerated HEP well  -TB  improved.  -SHARON    Recorded by [TB] Doris Marsh, OT [UD] Loli León PTA [SHARON] Myrtle Williamson OT    Cognitive Assessment/Intervention    Current Cognitive/Communication Assessment impaired  -TB  impaired   slow processing with ther. exer. with max cues needed.  -SHARON    Orientation Status oriented to;person;place;situation  -TB  oriented to;person   other not reassessed.  -SHARON    Follows Commands/Answers Questions able to follow single-step instructions;needs cueing;75% of the time  -TB  100% of the time;able to follow single-step instructions;needs cueing;needs increased time;needs repetition;75% of the time  -SHARON    Personal Safety  decreased awareness, need for assist;decreased insight to deficits  -TB  mild impairment  -SHARON    Personal Safety Interventions fall prevention program maintained;gait belt;nonskid shoes/slippers when out of bed   exit alarms; NWB R UE, R UE sling for comfort  -TB  fall prevention program maintained;gait belt;nonskid shoes/slippers when out of bed   No R sh ROM or WB  -SHARON    Recorded by [TB] Doris Marsh OT  [SHARON] Myrtle Williamson OT    Bed Mobility, Assessment/Treatment    Bed Mob, Supine to Sit, [place] moderate assist (50% patient effort);verbal cues required  -TB moderate assist (50% patient effort);2 person assist required  -UD     Bed Mob, Sit to Supine, [place] maximum assist (25% patient effort);verbal cues required  -TB moderate assist (50% patient effort);2 person assist required  -UD     Bed Mobility, Safety Issues decreased use of arms for pushing/pulling;decreased use of legs for bridging/pushing  -TB      Bed Mobility, Impairments strength decreased;pain;impaired balance  -TB      Bed Mobility, Comment   Pt. up in chair post PT when OT arrived.  -SHARON    Recorded by [TB] Doris Marsh, OT [UD] Loli León PTA [SHARON] Myrtle Williamson, OT    Transfer Assessment/Treatment    Transfers, Sit-Stand [place]  minimum assist (75% patient effort);2 person assist required  -UD     Transfers, Stand-Sit [place]  minimum assist (75% patient effort);2 person assist required  -UD     Transfer, Comment   Pt. declined up again due to recent PT tx   -SHARON    Recorded by  [UD] Loli León PTA [SHARON] Myrtle Williamson, OT    Gait Assessment/Treatment    Gait, [place] Level  contact guard assist;2 person assist required  -UD     Gait, Distance (Feet)  400   goes too fast   -UD     Gait, Safety Issues  --   leans forward  -UD     Gait, Impairments  strength decreased;impaired balance  -UD     Gait, Comment  --   cues for posture  -UD     Recorded by  [UD] Loli León PTA     Functional  Mobility    Functional Mobility- Comment   Pt. declined.  Just up in avalos with PT.  -SHARON    Recorded by   [SHARON] Myrtle Williamson OT    Upper Body Bathing Assessment/Training    UB Bathing Assess/Train, Comment Education reinforced for R UE axilla care via pendulum mvmts  -TB      Recorded by [TB] Doris Marsh OT      Upper Body Dressing Assessment/Training    UB Dressing Assess/Train, Clothing Type doffing:;donning:   Sling  -TB      UB Dressing Assess/Train, Position sitting  -TB      UB Dressing Assess/Train, [place] maximum assist (25% patient effort);verbal cues required  -TB      UB Dressing Assess/Train, Impairments ROM decreased;strength decreased;pain  -TB      UB Dressing Assess/Train, Comment Education reinforced for R UE silverio-dressing and sling mgmt  -TB      Recorded by [TB] Doris Marsh OT      Balance Skills Training    Sitting-Level of Assistance Close supervision  -TB Close supervision  -UD     Sitting-Balance Support Feet supported  -TB      Sitting-Balance Activities Forward lean   Pendulum HEP  -TB      Sitting # of Minutes 20  -TB      Recorded by [TB] Doris Marsh OT [UD] Loli León, PTA     Therapy Exercises    Bilateral Lower Extremities  AROM:;10 reps;sitting  -UD     Right Upper Extremity AAROM:;10 reps;shoulder rolls/shrugs;shoulder protraction/retraction;elbow flexion/extension;pronation/supination;hand pumps;PROM:;shoulder pendulum  -TB  AAROM:;AROM:;10 reps;sitting;elbow flexion/extension;hand pumps;pronation/supination;shoulder protraction/retraction;shoulder rolls/shrugs   wrist F/E, finger abd/add.  Neck ROM  -SHARON    RUE Resistance manual resistance- minimal   Pink Foam Block for hand pumps x10 reps  -TB  other (comment)   yellow foam block, yellow and pink issued.  -SHARON    Left Upper Extremity   AROM:;10 reps;15 reps;sitting;elbow flexion/extension;shoulder abduction/adduction;shoulder extension/flexion;shoulder horizontal abd/add;shoulder  protraction/retraction;shoulder rolls/shrugs  -SHARON    LUE Resistance   manual resistance- minimal   biceps/triceps,  L hand gripper  -SHARON    Recorded by [TB] Doris Marsh, OT [UD] Loli León PTA [SHARON] Myrtle Williamson, OT    Positioning and Restraints    Pre-Treatment Position in bed  -TB in bed  -UD sitting in chair/recliner  -SHARON    Post Treatment Position bed  -TB bed  -UD chair  -SHARON    In Bed notified nsg;supine;call light within reach;encouraged to call for assist;exit alarm on;RUE elevated   R sling intact; education reinforced for NWB R UE  -TB notified nsg;supine;call light within reach;exit alarm on;side rails up x2  -UD     In Chair   reclined;call light within reach;encouraged to call for assist;exit alarm on;RUE elevated   in sling, LUE repositioned for increased comfort  -SHARON    Recorded by [TB] Doris Marsh, MARLON [UD] Loli León PTA [SHARON] Myrtle Williamson OT      02/05/18 0854          Rehab Assessment/Intervention    Discipline physical therapy assistant  -AS      Document Type therapy note (daily note)  -AS      Subjective Information agree to therapy;complains of;pain  -AS      Patient Effort, Rehab Treatment good  -AS      Symptoms Noted During/After Treatment increased pain  -AS      Precautions/Limitations fall precautions;other (see comments)   sling for comfort, NWB R UE  -AS      Recorded by [AS] Katerine Laguna PTA      Pain Assessment    Pain Assessment 0-10  -AS      Pain Score 7  -AS      Post Pain Score 7  -AS      Pain Type Acute pain  -AS      Pain Location Shoulder  -AS      Pain Orientation Right  -AS      Patient's Stated Pain Goal No pain  -AS      Pain Intervention(s) Repositioned;Ambulation/increased activity  -AS      Response to Interventions tolerated  -AS      Recorded by [AS] Katerine Laguna PTA      Cognitive Assessment/Intervention    Current Cognitive/Communication Assessment functional  -AS      Orientation Status oriented to;person;place  -AS       Follows Commands/Answers Questions 100% of the time;able to follow single-step instructions  -AS      Personal Safety mild impairment;decreased awareness, need for assist  -AS      Personal Safety Interventions fall prevention program maintained;gait belt;nonskid shoes/slippers when out of bed;other (see comments)   exit alarm  -AS      Recorded by [AS] Katerine Laguna PTA      Bed Mobility, Assessment/Treatment    Bed Mob, Supine to Sit, [place] verbal cues required;minimum assist (75% patient effort)  -AS      Bed Mobility, Comment assist at trunk to reach EOB due to NWB R UE  -AS      Recorded by [AS] Katerine Laguna PTA      Transfer Assessment/Treatment    Transfers, Sit-Stand [place] verbal cues required;minimum assist (75% patient effort)  -AS      Transfers, Stand-Sit [place] verbal cues required;minimum assist (75% patient effort)  -AS      Transfers, Sit-Stand-Sit, Assist Device other (see comments)   B UE support on left  -AS      Transfer, Impairments impaired balance;strength decreased;pain  -AS      Transfer, Comment patient unsteady on initial stand but given a few seconds patient able to readjust  -AS      Recorded by [AS] Katerine Laguna PTA      Gait Assessment/Treatment    Gait, [place] Level verbal cues required;minimum assist (75% patient effort)  -AS      Gait, Assistive Device other (see comments)   B UE on left  -AS      Gait, Distance (Feet) 200  -AS      Gait, Gait Deviations antalgic;isaiha decreased;forward flexed posture;step length decreased  -AS      Gait, Safety Issues weight-shifting ability decreased;step length decreased  -AS      Gait, Impairments strength decreased;impaired balance;pain  -AS      Gait, Comment patient tolerated ambulation in hallway but had complaints of right shoulder pain  -AS      Recorded by [AS] Katerine Laguna PTA      Therapy Exercises    Bilateral Lower Extremities AROM:;10 reps;sitting;ankle pumps/circles;LAQ  -AS       Recorded by [AS] Katerine Laguna PTA      Positioning and Restraints    Pre-Treatment Position in bed  -AS      Post Treatment Position chair  -AS      In Chair reclined;call light within reach;encouraged to call for assist;exit alarm on;waffle cushion  -AS      Recorded by [AS] Katerine Laguna PTA        User Key  (r) = Recorded By, (t) = Taken By, (c) = Cosigned By    Initials Name Effective Dates    TB Doris Chaya Marsh, OT 06/22/15 -     SHARON Myrtle Luke Clay, OT 06/22/15 -     UD Lolideacon León, PTA 06/22/15 -     AS Katerine Laguna, PTA 06/22/15 -                 OT Goals       02/07/18 1153 02/05/18 1027 02/02/18 1349    Bed Mobility OT LTG    Bed Mobility OT LTG, Outcome goal ongoing   Mod A  -TB goal ongoing   Pt. already up in chair with PT.  -SHARON goal ongoing  -AC    Transfer Training OT LTG    Transfer Training OT LTG, Outcome goal ongoing  -TB goal ongoing   pt. just up with PT declined  -SHARON goal ongoing  -AC    Strength OT LTG    Strength Goal OT LTG, Outcome goal partially met   met x10 reps  -TB goal partially met   met this session, cont to follow.  -SHARON goal partially met   met today for 10reps  -AC    UB Dressing OT LTG    UB Dressing Goal OT LTG, Outcome goal ongoing   Max A   -TB goal ongoing   Pt. wanting to stay position was in.  -SHARON goal ongoing  -AC      01/31/18 1417 01/31/18 1415 01/29/18 1503    Bed Mobility OT LTG    Bed Mobility OT LTG, Time to Achieve   5 days  -ST    Bed Mobility OT LTG, Activity Type   supine to sit/sit to supine  -ST    Bed Mobility OT LTG, [place] Level   contact guard assist  -ST    Bed Mobility OT LTG, Assist Device   bed rails  -ST    Bed Mobility OT LTG, Outcome  --   Pt. declined  -SHARON goal ongoing  -ST    Transfer Training OT LTG    Transfer Training OT LTG, Time to Achieve   5 days  -ST    Transfer Training OT LTG, Activity Type   bed to chair /chair to bed;toilet  -ST    Transfer Training OT LTG, [place] Level   supervision required  -ST     Transfer Training OT LTG, Assist Device   cane, straight  -ST    Transfer Training OT LTG, Additional Goal   SPC if needed  -ST    Transfer Training OT LTG, Outcome  goal ongoing   per pt. just in bed,  declined  -SHARON goal ongoing  -ST    Strength OT LTG    Strength Goal OT LTG, Date Established 01/31/18  -SHARON      Strength Goal OT LTG, Time to Achieve 1 wk  -SHARON      Strength Goal OT LTG, Measure to Achieve Pt. will participated AAROM/AROM BUE (R elbow to hand only) 10-15 reps each session to support ADL and transfer indep.  -SHARON      Strength Goal OT LTG, Outcome goal ongoing  -SHARON      UB Dressing OT LTG    UB Dressing Goal OT LTG, Time to Achieve   5 days  -ST    UB Dressing Goal OT LTG, [place] Level   minimum assist (75% patient effort)  -ST    UB Dressing Goal OT LTG, Additional Goal   with silverio technique  -ST    UB Dressing Goal OT LTG, Outcome  goal ongoing   pt. declined to sit up to address  -SHARON goal ongoing  -ST      User Key  (r) = Recorded By, (t) = Taken By, (c) = Cosigned By    Initials Name Provider Type    TB Doris Marsh, OT Occupational Therapist    SHARON Myrtle Williamson, OT Occupational Therapist    AC Julia Rodriguez, OT Occupational Therapist    ST Cindi Marroquin, OTR Occupational Therapist          Occupational Therapy Education     Title: PT OT SLP Therapies (Done)     Topic: Occupational Therapy (Done)     Point: ADL training (Done)    Description: Instruct learner(s) on proper safety adaptation and remediation techniques during self care or transfers.   Instruct in proper use of assistive devices.    Learning Progress Summary    Learner Readiness Method Response Comment Documented by Status   Patient Acceptance E,D,TB VU,NR Education reinforced for R UE: NWB precautions, sling for comfort, HEP, positioning, axilla care and silverio-dressing strategies TB 02/07/18 1152 Done    Acceptance E VU compensatory strategy for LBD, reviewed UE HEP AC 02/02/18 1348 Done    Eager E,JAN DU,NR  UD  02/01/18 1142 Done    Acceptance E VU  KS 02/01/18 0934 Done    Acceptance E,D VU,NR UE ROM and LE ROM exer. to support ADL and  mobility indep  01/31/18 1414 Done    Acceptance EKINDRA D VU, DU role of OT, benfits of activity, WB'ing status, POC, transfers, bed mobility, safety  01/29/18 1502 Done               Point: Home exercise program (Done)    Description: Instruct learner(s) on appropriate technique for monitoring, assisting and/or progressing therapeutic exercises/activities.    Learning Progress Summary    Learner Readiness Method Response Comment Documented by Status   Patient Acceptance E,D,TB VU,NR Education reinforced for R UE: NWB precautions, sling for comfort, HEP, positioning, axilla care and silverio-dressing strategies  02/07/18 1152 Done    Acceptance E,D,H NR Pt. still needing constant cues with UE ROM exer. following priorly issued handout.  Encouragement for pt. to try to do more when therapy not there due to weakness R elbow, wrist and hand.  02/05/18 1026 Active    Eager JAN EASLEY,NR   02/01/18 1142 Done    Acceptance E VU  KS 02/01/18 0934 Done    Acceptance E,D VU,NR UE ROM and LE ROM exer. to support ADL and  mobility indep  01/31/18 1414 Done    Acceptance EKINDRA D VU, DU role of OT, benfits of activity, WB'ing status, POC, transfers, bed mobility, safety  01/29/18 1502 Done               Point: Precautions (Done)    Description: Instruct learner(s) on prescribed precautions during self-care and functional transfers.    Learning Progress Summary    Learner Readiness Method Response Comment Documented by Status   Patient Acceptance E,D,TB VU,NR Education reinforced for R UE: NWB precautions, sling for comfort, HEP, positioning, axilla care and silverio-dressing strategies  02/07/18 1152 Done    Eager ED DU,NR   02/01/18 1142 Done    Acceptance E VU  KS 02/01/18 0934 Done    Acceptance E,D VU,NR UE ROM and LE ROM exer. to support ADL and  mobility indep  01/31/18 1414 Done     Acceptance E,JAN ARGUELLES DU role of OT, benfits of activity, WB'ing status, POC, transfers, bed mobility, safety  01/29/18 1502 Done               Point: Body mechanics (Done)    Description: Instruct learner(s) on proper positioning and spine alignment during self-care, functional mobility activities and/or exercises.    Learning Progress Summary    Learner Readiness Method Response Comment Documented by Status   Patient Eager JAN EASLEY,NR  UD 02/01/18 1142 Done    Acceptance E VU  KS 02/01/18 0934 Done    Acceptance EKINDRA D VU, DU role of OT, benfits of activity, WB'ing status, POC, transfers, bed mobility, safety  01/29/18 1502 Done                      User Key     Initials Effective Dates Name Provider Type Discipline    TB 06/22/15 -  Doris Marsh, OT Occupational Therapist OT    HSARON 06/22/15 -  Myrtle Williamson, OT Occupational Therapist OT    AC 06/23/15 -  Julia Rodriguez, OT Occupational Therapist OT    UD 06/22/15 -  Loli León PTA Physical Therapy Assistant PT    ST 02/20/17 -  Cindi Marroquin, OTR Occupational Therapist OT    KS 09/18/16 -  Jeni Fletcher RN Registered Nurse Nurse                  OT Recommendation and Plan  Anticipated Equipment Needs At Discharge:  (TBD based on placement)  Anticipated Discharge Disposition: skilled nursing facility  Planned Therapy Interventions: adaptive equipment training, ADL retraining, balance training, bed mobility training, home exercise program, ROM (Range of Motion), transfer training  Therapy Frequency: daily  Plan of Care Review  Plan Of Care Reviewed With: patient  Outcome Summary/Follow up Plan: Pt alert and motivated to work with therapy. Pain R shoulder limits ADL participation. HEP completed per MD orders.  Education reinforced for R Shoulder precautions, postioning and care. OT to follow. D/C plan is rehab.        Outcome Measures       02/07/18 1110 02/06/18 1330 02/05/18 0917    How much help from another person do you currently need...    Turning  from your back to your side while in flat bed without using bedrails?  2  -UD     Moving from lying on back to sitting on the side of a flat bed without bedrails?  2  -UD     Moving to and from a bed to a chair (including a wheelchair)?  3  -UD     Standing up from a chair using your arms (e.g., wheelchair, bedside chair)?  3  -UD     Climbing 3-5 steps with a railing?  2  -UD     To walk in hospital room?  3  -UD     AM-PAC 6 Clicks Score  15  -UD     How much help from another is currently needed...    Putting on and taking off regular lower body clothing? 2  -TB  2  -SHARON    Bathing (including washing, rinsing, and drying) 2  -TB  2  -SHARON    Toileting (which includes using toilet bed pan or urinal) 2  -TB  2  -SHARON    Putting on and taking off regular upper body clothing 2  -TB  2  -SHARON    Taking care of personal grooming (such as brushing teeth) 2  -TB  2  -SHARON    Eating meals 3  -TB  3  -SHARON    Score 13  -TB  13  -SHARON    Functional Assessment    Outcome Measure Options AM-PAC 6 Clicks Daily Activity (OT)  -TB  AM-PAC 6 Clicks Daily Activity (OT)  -SHARON      02/05/18 0854          How much help from another person do you currently need...    Turning from your back to your side while in flat bed without using bedrails? 2  -AS      Moving from lying on back to sitting on the side of a flat bed without bedrails? 2  -AS      Moving to and from a bed to a chair (including a wheelchair)? 3  -AS      Standing up from a chair using your arms (e.g., wheelchair, bedside chair)? 3  -AS      Climbing 3-5 steps with a railing? 2  -AS      To walk in hospital room? 3  -AS      AM-PAC 6 Clicks Score 15  -AS      Functional Assessment    Outcome Measure Options AM-PAC 6 Clicks Basic Mobility (PT)  -AS        User Key  (r) = Recorded By, (t) = Taken By, (c) = Cosigned By    Initials Name Provider Type    TB Doris Marsh, OT Occupational Therapist    SHARON Williamson, OT Occupational Therapist    NATHALIA León, PTA Physical  Therapy Assistant    AS Katerine Laguna PTA Physical Therapy Assistant           Time Calculation:         Time Calculation- OT       02/07/18 1157          Time Calculation- OT    OT Start Time 1110  -TB      Total Timed Code Minutes- OT 28 minute(s)  -TB      OT Received On 02/07/18  -TB      OT Goal Re-Cert Due Date 02/08/18  -TB        User Key  (r) = Recorded By, (t) = Taken By, (c) = Cosigned By    Initials Name Provider Type    TB Doris Marsh OT Occupational Therapist           Therapy Charges for Today     Code Description Service Date Service Provider Modifiers Qty    [id number]  OT THERAPEUTIC ACT EA 15 MIN 2/7/2018 Doris Marsh OT GO 2          OT G-codes  OT Professional Judgement Used?: Yes  OT Functional Scales Options: AM-PAC 6 Clicks Daily Activity (OT)  Score: 8  Functional Limitation: Self care  Self Care Current Status (): At least 80 percent but less than 100 percent impaired, limited or restricted  Self Care Goal Status (): At least 60 percent but less than 80 percent impaired, limited or restricted    Doris Marsh OT  2/7/2018

## 2018-02-07 NOTE — PROGRESS NOTES
Continued Stay Note  [facility]     Patient Name: Liane Griffin  MRN: [id number]  Today's Date: 2/7/2018    Admit Date: 1/27/2018          Discharge Plan       02/07/18 0858    Case Management/Social Work Plan    Plan Rehab    Additional Comments Received call from Admissions at [facility] in [facility], they will offer patient a rehab bed pending insurance approval.  Plan transfer once all arrangements in place.   Leola Mims, Ext. [phone]              Discharge Codes     None        Expected Discharge Date and Time     Expected Discharge Date Expected Discharge Time    Feb 6, 2018             GUDELIA Wiley

## 2018-02-07 NOTE — THERAPY TREATMENT NOTE
Acute Care - Physical Therapy Treatment Note  [facility]     Patient Name: Liane Griffin  : 1950  MRN: [id number]  Today's Date: 2018  Onset of Illness/Injury or Date of Surgery Date: 18  Date of Referral to PT: 18  Referring Physician: JOE Mckeon    Admit Date: 2018    Visit Dx:    ICD-10-CM ICD-9-CM   1. Torticollis, acute M43.6 723.5   2. 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 Physical deconditioning R53.81 799.3   4. Impaired mobility and ADLs Z74.09 799.89   5. Impaired functional mobility, balance, gait, and endurance Z74.09 V49.89     Patient Active Problem List   Diagnosis   • Immobility   • CAD (coronary artery disease)   • Diabetes mellitus, type II   • HTN (hypertension)   • HLD (hyperlipidemia)   • Schizophrenia   • Anemia   • Tremor   • Recent fracture of right humeral head               Adult Rehabilitation Note       18 1300 18 1110 18 1330    Rehab Assessment/Intervention    Discipline physical therapy assistant  -AS occupational therapist  -TB physical therapy assistant  -UD    Document Type therapy note (daily note)  -AS therapy note (daily note)  -TB therapy note (daily note)  -UD    Subjective Information agree to therapy;complains of;pain  -AS agree to therapy;complains of;pain  -TB agree to therapy;complains of;weakness;pain  -UD    Patient Effort, Rehab Treatment good  -AS good  -TB good  -UD    Symptoms Noted During/After Treatment none  -AS increased pain  -TB fatigue  -UD    Precautions/Limitations fall precautions;shoulder precautions;non-weight bearing status   NWB R UE Fx with sling for comfort.  -AS fall precautions;shoulder precautions;other (see comments);non-weight bearing status   Non-op R UE Fx for sling for comfort; NWB R UE  -TB     Specific Treatment Considerations  R Sling for comfort/protection; NWB R UE  -TB sling rt shoulder  -UD    Patient  Response to Treatment  Ortho progress note dated 2/6/2018 added Pendulum exercises as tolerated  -TB     Recorded by [AS] Katerine Laguna PTA [TB] Doris Marsh OT [UD] Loli León PTA    Vital Signs    O2 Delivery Post Treatment  room air  -TB room air  -UD    Pre Patient Position  Supine  -TB Supine  -UD    Intra Patient Position  Sitting  -TB Standing  -UD    Post Patient Position  Supine  -TB Supine  -UD    Recorded by  [TB] Doris Marsh OT [UD] Loli León PTA    Pain Assessment    Pain Assessment Williamson-Mcallister FACES  -AS Williamson-Baker FACES  -TB Williamson-Baker FACES  -UD    Williamson-Mcallister FACES Pain Rating 4  -AS 4  -TB 2  -UD    Pain Score   2  -UD    Post Pain Score 2  -AS 2  -TB 2  -UD    Pain Type Acute pain  -AS Acute pain  -TB Acute pain  -UD    Pain Location Shoulder  -AS Shoulder  -TB Shoulder  -UD    Pain Orientation Right  -AS Right  -TB Right  -UD    Pain Radiating Towards elbow  -AS elbow  -TB     Pain Intervention(s) Repositioned;Ambulation/increased activity  -AS Ambulation/increased activity;Repositioned   HEP  -TB Repositioned  -UD    Response to Interventions  Pt tolerated HEP well  -TB     Recorded by [AS] Katerine Laguna, KORINA [TB] Doris Marsh OT [UD] Loli León PTA    Cognitive Assessment/Intervention    Current Cognitive/Communication Assessment impaired  -AS impaired  -TB     Orientation Status oriented to;person;place  -AS oriented to;person;place;situation  -TB     Follows Commands/Answers Questions 100% of the time;able to follow single-step instructions  -AS able to follow single-step instructions;needs cueing;75% of the time  -TB     Personal Safety mild impairment;decreased awareness, need for assist;decreased awareness, need for safety  -AS decreased awareness, need for assist;decreased insight to deficits  -TB     Personal Safety Interventions fall prevention program maintained;gait belt;nonskid shoes/slippers when out of bed;other (see comments)    exit alarm  -AS fall prevention program maintained;gait belt;nonskid shoes/slippers when out of bed   exit alarms; NWB R UE, R UE sling for comfort  -TB     Recorded by [AS] Katerine Laguna PTA [TB] Doris Marsh, MARLON     Bed Mobility, Assessment/Treatment    Bed Mob, Supine to Sit, [place] verbal cues required;maximum assist (25% patient effort)  -AS moderate assist (50% patient effort);verbal cues required  -TB moderate assist (50% patient effort);2 person assist required  -UD    Bed Mob, Sit to Supine, [place] moderate assist (50% patient effort)  -AS maximum assist (25% patient effort);verbal cues required  -TB moderate assist (50% patient effort);2 person assist required  -UD    Bed Mobility, Safety Issues decreased use of arms for pushing/pulling  -AS decreased use of arms for pushing/pulling;decreased use of legs for bridging/pushing  -TB     Bed Mobility, Impairments strength decreased;pain  -AS strength decreased;pain;impaired balance  -TB     Bed Mobility, Comment assist for trunk to reach EOB  -AS      Recorded by [AS] Katerine Laguna PTA [TB] Doris Marsh, OT [UD] Loli León PTA    Transfer Assessment/Treatment    Transfers, Sit-Stand [place] verbal cues required;minimum assist (75% patient effort)  -AS  minimum assist (75% patient effort);2 person assist required  -UD    Transfers, Stand-Sit [place] verbal cues required;minimum assist (75% patient effort)  -AS  minimum assist (75% patient effort);2 person assist required  -UD    Transfers, Sit-Stand-Sit, Assist Device other (see comments)   L UE support  -AS      Toilet Transfer, [place] verbal cues required;contact guard assist  -AS      Toilet Transfer, Assistive Device other (see comments)   L UE support  -AS      Transfer, Safety Issues sequencing ability decreased;step length decreased;balance decreased during turns;weight-shifting ability decreased  -AS      Transfer, Impairments strength  decreased;impaired balance  -AS      Transfer, Comment verbal cues to reach back with left hand for bed and commode  -AS      Recorded by [AS] Katerine Laguna PTA  [UD] Loli León PTA    Gait Assessment/Treatment    Gait, [place] Level verbal cues required;minimum assist (75% patient effort)  -AS  contact guard assist;2 person assist required  -UD    Gait, Distance (Feet) 350  -AS  400   goes too fast   -UD    Gait, Gait Deviations antalgic;isaiah decreased;forward flexed posture;step length decreased   fast pace  -AS      Gait, Safety Issues weight-shifting ability decreased;balance decreased during turns;sequencing ability decreased;step length decreased  -AS  --   leans forward  -UD    Gait, Impairments strength decreased;impaired balance  -AS  strength decreased;impaired balance  -UD    Gait, Comment verbal cues to slow down and to take time with directional changes  -AS  --   cues for posture  -UD    Recorded by [AS] Katerine Laguna PTA  [UD] Loli León PTA    Upper Body Bathing Assessment/Training    UB Bathing Assess/Train, Comment  Education reinforced for R UE axilla care via pendulum mvmts  -TB     Recorded by  [TB] Doris Marsh OT     Upper Body Dressing Assessment/Training    UB Dressing Assess/Train, Clothing Type  doffing:;donning:   Sling  -TB     UB Dressing Assess/Train, Position  sitting  -TB     UB Dressing Assess/Train, [place]  maximum assist (25% patient effort);verbal cues required  -TB     UB Dressing Assess/Train, Impairments  ROM decreased;strength decreased;pain  -TB     UB Dressing Assess/Train, Comment  Education reinforced for R UE silverio-dressing and sling mgmt  -TB     Recorded by  [TB] Doris Marsh OT     Balance Skills Training    Sitting-Level of Assistance  Close supervision  -TB Close supervision  -UD    Sitting-Balance Support  Feet supported  -TB     Sitting-Balance Activities  Forward lean   Pendulum HEP  -TB     Sitting # of Minutes   "20  -TB     Recorded by  [TB] Doris Marsh OT [UD] Loli León PTA    Therapy Exercises    Bilateral Lower Extremities --   deferred per patient was \"too cold\"   -AS  AROM:;10 reps;sitting  -UD    Right Upper Extremity  AAROM:;10 reps;shoulder rolls/shrugs;shoulder protraction/retraction;elbow flexion/extension;pronation/supination;hand pumps;PROM:;shoulder pendulum  -TB     RUE Resistance  manual resistance- minimal   Pink Foam Block for hand pumps x10 reps  -TB     Recorded by [AS] Katerine Laguna PTA [TB] Doris Marsh OT [UD] Loli León PTA    Positioning and Restraints    Pre-Treatment Position in bed  -AS in bed  -TB in bed  -UD    Post Treatment Position bed  -AS bed  -TB bed  -UD    In Bed supine;call light within reach;encouraged to call for assist;exit alarm on   with sling  -AS notified nsg;supine;call light within reach;encouraged to call for assist;exit alarm on;RUE elevated   R sling intact; education reinforced for NWB R UE  -TB notified nsg;supine;call light within reach;exit alarm on;side rails up x2  -UD    Recorded by [AS] Katerine Laguna PTA [TB] Doris Marsh OT [UD] Loli León PTA      02/05/18 0917 02/05/18 0854       Rehab Assessment/Intervention    Discipline occupational therapist  -SHARON physical therapy assistant  -AS     Document Type therapy note (daily note)  -SHARON therapy note (daily note)  -AS     Subjective Information agree to therapy;complains of;pain  -SHARON agree to therapy;complains of;pain  -AS     Patient Effort, Rehab Treatment good  -SHARON good  -AS     Symptoms Noted During/After Treatment none  -SHARON increased pain  -AS     Precautions/Limitations fall precautions   sling for comfort, RUE NWB,   -SHARON fall precautions;other (see comments)   sling for comfort, NWB R UE  -AS     Specific Treatment Considerations humeral fx RUE, no sh. ROM  -SHARON      Recorded by [SHARON] Myrtle Williamson, OT [AS] Katerine Laguna PTA     Vital Signs    Posttreatment " Heart Rate (beats/min) 62  -SHARON      Pre SpO2 (%) --   off other telemetry  -SHARON      Pre Patient Position Sitting  -SHARON      Intra Patient Position Sitting  -SHARON      Post Patient Position Sitting  -SHARON      Recorded by [SHARON] Myrtle Williamson OT      Pain Assessment    Pain Assessment 0-10  -SHARON 0-10  -AS     Pain Score 8  -SHARON 7  -AS     Post Pain Score 7  -SHARON 7  -AS     Pain Type Acute pain  -SHARON Acute pain  -AS     Pain Location Shoulder   per pt. more specifically in her Right shoulder blade.  -SHARON Shoulder  -AS     Pain Orientation Right  -SHARON Right  -AS     Patient's Stated Pain Goal  No pain  -AS     Pain Intervention(s) Ambulation/increased activity  -SHARON Repositioned;Ambulation/increased activity  -AS     Response to Interventions improved.  -SHARON tolerated  -AS     Recorded by [SHARON] Myrtle Williamson, OT [AS] Katerine Laguna PTA     Cognitive Assessment/Intervention    Current Cognitive/Communication Assessment impaired   slow processing with ther. exer. with max cues needed.  -SHARON functional  -AS     Orientation Status oriented to;person   other not reassessed.  -SHARON oriented to;person;place  -AS     Follows Commands/Answers Questions 100% of the time;able to follow single-step instructions;needs cueing;needs increased time;needs repetition;75% of the time  -SHARON 100% of the time;able to follow single-step instructions  -AS     Personal Safety mild impairment  -SHARON mild impairment;decreased awareness, need for assist  -AS     Personal Safety Interventions fall prevention program maintained;gait belt;nonskid shoes/slippers when out of bed   No R sh ROM or WB  -SHARON fall prevention program maintained;gait belt;nonskid shoes/slippers when out of bed;other (see comments)   exit alarm  -AS     Recorded by [SHARON] Myrtle Williamson, OT [AS] Katerine Laguna PTA     Bed Mobility, Assessment/Treatment    Bed Mob, Supine to Sit, [place]  verbal cues required;minimum assist (75% patient effort)  -AS     Bed Mobility, Comment Pt. up  in chair post PT when OT arrived.  -SHARON assist at trunk to reach EOB due to NWB R UE  -AS     Recorded by [SHARON] Myrtle Williamson, OT [AS] Katerine Laguna PTA     Transfer Assessment/Treatment    Transfers, Sit-Stand [place]  verbal cues required;minimum assist (75% patient effort)  -AS     Transfers, Stand-Sit [place]  verbal cues required;minimum assist (75% patient effort)  -AS     Transfers, Sit-Stand-Sit, Assist Device  other (see comments)   B UE support on left  -AS     Transfer, Impairments  impaired balance;strength decreased;pain  -AS     Transfer, Comment Pt. declined up again due to recent PT tx   -SHARON patient unsteady on initial stand but given a few seconds patient able to readjust  -AS     Recorded by [SHARON] Myrtle Williamson, OT [AS] Katerine Laguna PTA     Gait Assessment/Treatment    Gait, [place] Level  verbal cues required;minimum assist (75% patient effort)  -AS     Gait, Assistive Device  other (see comments)   B UE on left  -AS     Gait, Distance (Feet)  200  -AS     Gait, Gait Deviations  antalgic;isaiah decreased;forward flexed posture;step length decreased  -AS     Gait, Safety Issues  weight-shifting ability decreased;step length decreased  -AS     Gait, Impairments  strength decreased;impaired balance;pain  -AS     Gait, Comment  patient tolerated ambulation in hallway but had complaints of right shoulder pain  -AS     Recorded by  [AS] Katerine Laguna PTA     Functional Mobility    Functional Mobility- Comment Pt. declined.  Just up in [place] with PT.  -SHARON      Recorded by [SHARON] Myrtle Williamson OT      Therapy Exercises    Bilateral Lower Extremities  AROM:;10 reps;sitting;ankle pumps/circles;LAQ  -AS     Right Upper Extremity AAROM:;AROM:;10 reps;sitting;elbow flexion/extension;hand pumps;pronation/supination;shoulder protraction/retraction;shoulder rolls/shrugs   wrist F/E, finger abd/add.  Neck ROM  -SHARON      RUE Resistance other (comment)   yellow foam block, yellow and  pink issued.  -SHARON      Left Upper Extremity AROM:;10 reps;15 reps;sitting;elbow flexion/extension;shoulder abduction/adduction;shoulder extension/flexion;shoulder horizontal abd/add;shoulder protraction/retraction;shoulder rolls/shrugs  -SHARON      LUE Resistance manual resistance- minimal   biceps/triceps,  L hand gripper  -SHARON      Recorded by [SHARON] Myrtle Williamson, OT [AS] Katerine Laguna PTA     Positioning and Restraints    Pre-Treatment Position sitting in chair/recliner  -SHARON in bed  -AS     Post Treatment Position chair  -SHARON chair  -AS     In Chair reclined;call light within reach;encouraged to call for assist;exit alarm on;RUE elevated   in sling, LUE repositioned for increased comfort  -SHARON reclined;call light within reach;encouraged to call for assist;exit alarm on;waffle cushion  -AS     Recorded by [SHARON] Myrtle Williamson, OT [AS] Katerine Laguna PTA       User Key  (r) = Recorded By, (t) = Taken By, (c) = Cosigned By    Initials Name Effective Dates    TB Doris Marsh, OT 06/22/15 -     SHARON Myrtle Williamson, OT 06/22/15 -     UD Loli León, PTA 06/22/15 -     AS Katerine Laguna, PTA 06/22/15 -                 IP PT Goals       02/07/18 1325 02/06/18 1436 02/05/18 0948    Bed Mobility PT LTG    Bed Mobility PT LTG, Date Goal Reviewed 02/07/18  -AS  02/05/18  -AS    Bed Mobility PT LTG, Outcome goal ongoing  -AS goal ongoing  -UD goal ongoing  -AS    Transfer Training PT LTG    Transfer Training PT  LTG, Date Goal Reviewed 02/07/18  -AS  02/05/18  -AS    Transfer Training PT LTG, Outcome goal ongoing  -AS goal ongoing  -UD goal ongoing  -AS    Gait Training PT LTG    Gait Training Goal PT LTG, Date Goal Reviewed 02/07/18  -AS  02/05/18  -AS    Gait Training Goal PT LTG, Outcome goal ongoing  -AS goal ongoing  -UD goal ongoing  -AS      02/03/18 1523 02/01/18 1142 01/30/18 1308    Bed Mobility PT LTG    Bed Mobility PT LTG, Date Goal Reviewed 02/03/18  -LM      Bed Mobility PT LTG, Outcome goal  ongoing  -LM goal ongoing  -UD goal ongoing  -KR    Transfer Training PT LTG    Transfer Training PT  LTG, Date Goal Reviewed 02/03/18  -LM      Transfer Training PT LTG, Outcome goal ongoing  -LM goal ongoing  -UD goal ongoing  -KR    Gait Training PT LTG    Gait Training Goal PT LTG, Date Goal Reviewed 02/03/18  -LM      Gait Training Goal PT LTG, Outcome goal ongoing  -LM goal ongoing  -UD goal ongoing  -KR      01/29/18 0931 01/28/18 1159       Bed Mobility PT LTG    Bed Mobility PT LTG, Time to Achieve  4 days  -CT     Bed Mobility PT LTG, Activity Type  all bed mobility  -CT     Bed Mobility PT LTG, [place] Level  independent  -CT     Bed Mobility PT LTG, Date Goal Reviewed 01/29/18  -AS      Bed Mobility PT LTG, Outcome goal ongoing  -AS      Transfer Training PT LTG    Transfer Training PT LTG, Time to Achieve  4 days  -CT     Transfer Training PT LTG, [place] Level  independent  -CT     Transfer Training PT  LTG, Date Goal Reviewed 01/29/18  -AS      Transfer Training PT LTG, Outcome goal ongoing  -AS      Gait Training PT LTG    Gait Training Goal PT LTG, Date Established  01/28/18  -CT     Gait Training Goal PT LTG, [place] Level  conditional independence;supervision required  -CT     Gait Training Goal PT LTG, Assist Device  other (see comments)  -CT     Gait Training Goal PT LTG, Distance to Achieve  hha to 500  -CT     Gait Training Goal PT LTG, Date Goal Reviewed 01/29/18  -AS      Gait Training Goal PT LTG, Outcome goal ongoing  -AS        User Key  (r) = Recorded By, (t) = Taken By, (c) = Cosigned By    Initials Name Provider Type    UD Loli León, PTA Physical Therapy Assistant    AS Katerine Laguna, PTA Physical Therapy Assistant    CT Chris Che, PT Physical Therapist    MANJINDER Aly, PT Physical Therapist    GABBY Smith, PT Physical Therapist          Physical Therapy Education     Title: PT OT SLP Therapies (Active)     Topic: Physical Therapy  (Active)     Point: Mobility training (Active)    Learning Progress Summary    Learner Readiness Method Response Comment Documented by Status   Patient Acceptance E NR  AS 02/07/18 1325 Active    Eager E,D DU,NR  UD 02/06/18 1435 Done    Acceptance E NR  AS 02/05/18 0947 Active    Acceptance E,D NR Reviewed benefits of activity, WB status, correct gait mechanics, safety with mobility. LM 02/03/18 1523 Active    Eager ED DU,NR  UD 02/01/18 1142 Done    Acceptance E VU  KS 02/01/18 0934 Done    Acceptance E NR  KR 01/30/18 1308 Active    Acceptance E NR  AS 01/29/18 0931 Active    Acceptance E NR  CT 01/28/18 1201 Active               Point: Home exercise program (Active)    Learning Progress Summary    Learner Readiness Method Response Comment Documented by Status   Patient Acceptance E NR  AS 02/07/18 1325 Active    Eager TRACEED DU,NR  UD 02/06/18 1435 Done    Acceptance E NR  AS 02/05/18 0947 Active    Acceptance E,D NR Reviewed benefits of activity, WB status, correct gait mechanics, safety with mobility. LM 02/03/18 1523 Active    Eager ED DU,NR  UD 02/01/18 1142 Done    Acceptance E VU  KS 02/01/18 0934 Done    Acceptance E NR  AS 01/29/18 0931 Active    Acceptance E NR  CT 01/28/18 1201 Active               Point: Body mechanics (Active)    Learning Progress Summary    Learner Readiness Method Response Comment Documented by Status   Patient Acceptance E NR  AS 02/07/18 1325 Active    Eager ED DU,NR  UD 02/06/18 1435 Done    Acceptance E NR  AS 02/05/18 0947 Active    Acceptance E,D NR Reviewed benefits of activity, WB status, correct gait mechanics, safety with mobility. LM 02/03/18 1523 Active    Eager E,D DU,NR  UD 02/01/18 1142 Done    Acceptance E VU  KS 02/01/18 0934 Done    Acceptance E NR  KR 01/30/18 1308 Active    Acceptance E NR  AS 01/29/18 0931 Active    Acceptance E NR  CT 01/28/18 1201 Active               Point: Precautions (Active)    Learning Progress Summary    Learner Readiness Method  Response Comment Documented by Status   Patient Acceptance E NR  AS 02/07/18 1325 Active    Eager E,D DU,NR  UD 02/06/18 1435 Done    Acceptance E NR  AS 02/05/18 0947 Active    Acceptance E,D NR Reviewed benefits of activity, WB status, correct gait mechanics, safety with mobility. LM 02/03/18 1523 Active    Eager E,D DU,NR  UD 02/01/18 1142 Done    Acceptance E VU  KS 02/01/18 0934 Done    Acceptance E NR  KR 01/30/18 1308 Active    Acceptance E NR  AS 01/29/18 0931 Active    Acceptance E NR  CT 01/28/18 1201 Active                      User Key     Initials Effective Dates Name Provider Type Discipline    UD 06/22/15 -  Loli León, PTA Physical Therapy Assistant PT    AS 06/22/15 -  Katerine Laguna, PTA Physical Therapy Assistant PT    CT 06/19/15 -  Chris Che, PT Physical Therapist PT    KS 09/18/16 -  Jeni Fletcher, RN Registered Nurse Nurse    KR 09/25/17 -  Ava Aly, PT Physical Therapist PT    LM 06/09/17 -  Mary Smith, PT Physical Therapist PT                    PT Recommendation and Plan  Planned Therapy Interventions: balance training, bed mobility training, gait training, home exercise program, strengthening  Plan of Care Review  Plan Of Care Reviewed With: patient  Progress: improving  Outcome Summary/Follow up Plan: patient needs assist for all activity secondary to decreased use of UE due to NWB status on left, pain and weakness. Verbal cues to slow down during moblity secondary to impaired balance.          Outcome Measures       02/07/18 1300 02/07/18 1110 02/06/18 1330    How much help from another person do you currently need...    Turning from your back to your side while in flat bed without using bedrails? 2  -AS  2  -UD    Moving from lying on back to sitting on the side of a flat bed without bedrails? 2  -AS  2  -UD    Moving to and from a bed to a chair (including a wheelchair)? 3  -AS  3  -UD    Standing up from a chair using your arms (e.g., wheelchair, bedside  chair)? 3  -AS  3  -UD    Climbing 3-5 steps with a railing? 2  -AS  2  -UD    To walk in hospital room? 3  -AS  3  -UD    AM-PAC 6 Clicks Score 15  -AS  15  -UD    How much help from another is currently needed...    Putting on and taking off regular lower body clothing?  2  -TB     Bathing (including washing, rinsing, and drying)  2  -TB     Toileting (which includes using toilet bed pan or urinal)  2  -TB     Putting on and taking off regular upper body clothing  2  -TB     Taking care of personal grooming (such as brushing teeth)  2  -TB     Eating meals  3  -TB     Score  13  -TB     Functional Assessment    Outcome Measure Options AM-[facility] 6 Clicks Basic Mobility (PT)  -AS AM-[facility] 6 Clicks Daily Activity (OT)  -       02/05/18 0917 02/05/18 0854       How much help from another person do you currently need...    Turning from your back to your side while in flat bed without using bedrails?  2  -AS     Moving from lying on back to sitting on the side of a flat bed without bedrails?  2  -AS     Moving to and from a bed to a chair (including a wheelchair)?  3  -AS     Standing up from a chair using your arms (e.g., wheelchair, bedside chair)?  3  -AS     Climbing 3-5 steps with a railing?  2  -AS     To walk in hospital room?  3  -AS     AM-PAC 6 Clicks Score  15  -AS     How much help from another is currently needed...    Putting on and taking off regular lower body clothing? 2  -SHARON      Bathing (including washing, rinsing, and drying) 2  -SHARON      Toileting (which includes using toilet bed pan or urinal) 2  -SHARON      Putting on and taking off regular upper body clothing 2  -SHARON      Taking care of personal grooming (such as brushing teeth) 2  -SHARON      Eating meals 3  -SHARON      Score 13  -SHARON      Functional Assessment    Outcome Measure Options AM-[facility] 6 Clicks Daily Activity (OT)  -SHARON AM-[facility] 6 Clicks Basic Mobility (PT)  -AS       User Key  (r) = Recorded By, (t) = Taken By, (c) = Cosigned By    Initials Name  Provider Type    TB Doris Marsh, OT Occupational Therapist    SHARON Williamson, OT Occupational Therapist    NATHALIA León, PTA Physical Therapy Assistant    AS Katerine Laguna PTA Physical Therapy Assistant           Time Calculation:         PT Charges       02/07/18 1327          Time Calculation    Start Time 1300  -AS      PT Received On 02/07/18  -AS      PT Goal Re-Cert Due Date 02/07/18  -AS      Time Calculation- PT    Total Timed Code Minutes- PT 15 minute(s)  -AS        User Key  (r) = Recorded By, (t) = Taken By, (c) = Cosigned By    Initials Name Provider Type    AS Katerine Laguna PTA Physical Therapy Assistant          Therapy Charges for Today     Code Description Service Date Service Provider Modifiers Qty    67771907509 HC GAIT TRAINING EA 15 MIN 2/7/2018 Katerine Laguna PTA GP 1    21502178494 HC PT THER SUPP EA 15 MIN 2/7/2018 Katerine Laguna PTA GP 1          PT G-Codes  PT Professional Judgement Used?: Yes  Outcome Measure Options: AM-PAC 6 Clicks Basic Mobility (PT)  Score: 15  Functional Limitation: Mobility: Walking and moving around  Mobility: Walking and Moving Around Current Status (): At least 40 percent but less than 60 percent impaired, limited or restricted  Mobility: Walking and Moving Around Goal Status (): At least 20 percent but less than 40 percent impaired, limited or restricted    Katerine Laguna PTA  2/7/2018

## 2018-02-07 NOTE — PLAN OF CARE
Problem: Patient Care Overview (Adult)  Goal: Plan of Care Review  Outcome: Ongoing (interventions implemented as appropriate)   02/07/18 1512   Coping/Psychosocial Response Interventions   Plan Of Care Reviewed With patient   Patient Care Overview   Progress improving   Outcome Evaluation   Outcome Summary/Follow up Plan Pt has reported less pain today and slept off and on throughout the shift. She has eaten well and participated in therapy. Large BM this morning followed by several more smaller ones today and pt reports she feels much better. No acute changes/issues.

## 2018-02-08 PROCEDURE — 97110 THERAPEUTIC EXERCISES: CPT

## 2018-02-08 PROCEDURE — 99225 PR SBSQ OBSERVATION CARE/DAY 25 MINUTES: CPT | Performed by: HOSPITALIST

## 2018-02-08 PROCEDURE — G0378 HOSPITAL OBSERVATION PER HR: HCPCS

## 2018-02-08 PROCEDURE — 96372 THER/PROPH/DIAG INJ SC/IM: CPT

## 2018-02-08 PROCEDURE — 25010000002 HEPARIN (PORCINE) PER 1000 UNITS: Performed by: NURSE PRACTITIONER

## 2018-02-08 PROCEDURE — 97116 GAIT TRAINING THERAPY: CPT

## 2018-02-08 RX ADMIN — HYDROCODONE BITARTRATE AND ACETAMINOPHEN 1 TABLET: 7.5; 325 TABLET ORAL at 22:18

## 2018-02-08 RX ADMIN — ATORVASTATIN CALCIUM 40 MG: 40 TABLET, FILM COATED ORAL at 20:29

## 2018-02-08 RX ADMIN — ACETAMINOPHEN 650 MG: 325 TABLET, FILM COATED ORAL at 01:29

## 2018-02-08 RX ADMIN — OXYCODONE HYDROCHLORIDE AND ACETAMINOPHEN 500 MG: 500 TABLET ORAL at 08:09

## 2018-02-08 RX ADMIN — OLANZAPINE 10 MG: 5 TABLET, FILM COATED ORAL at 20:31

## 2018-02-08 RX ADMIN — FLUOXETINE 10 MG: 10 CAPSULE ORAL at 08:09

## 2018-02-08 RX ADMIN — ACETAMINOPHEN 650 MG: 325 TABLET, FILM COATED ORAL at 20:38

## 2018-02-08 RX ADMIN — CLOPIDOGREL BISULFATE 75 MG: 75 TABLET ORAL at 08:09

## 2018-02-08 RX ADMIN — TRAZODONE HYDROCHLORIDE 50 MG: 50 TABLET ORAL at 20:29

## 2018-02-08 RX ADMIN — GABAPENTIN 400 MG: 400 CAPSULE ORAL at 20:29

## 2018-02-08 RX ADMIN — FLUTICASONE PROPIONATE 2 SPRAY: 50 SPRAY, METERED NASAL at 08:10

## 2018-02-08 RX ADMIN — Medication 325 MG: at 08:10

## 2018-02-08 RX ADMIN — HYDROCODONE BITARTRATE AND ACETAMINOPHEN 1 TABLET: 7.5; 325 TABLET ORAL at 01:29

## 2018-02-08 RX ADMIN — HEPARIN SODIUM 5000 UNITS: 5000 INJECTION, SOLUTION INTRAVENOUS; SUBCUTANEOUS at 08:08

## 2018-02-08 RX ADMIN — CETIRIZINE HYDROCHLORIDE 10 MG: 10 TABLET, FILM COATED ORAL at 20:29

## 2018-02-08 RX ADMIN — CLONAZEPAM 0.5 MG: 0.5 TABLET ORAL at 20:29

## 2018-02-08 RX ADMIN — BUSPIRONE HYDROCHLORIDE 5 MG: 10 TABLET ORAL at 08:09

## 2018-02-08 RX ADMIN — LIDOCAINE 1 PATCH: 50 PATCH CUTANEOUS at 08:08

## 2018-02-08 RX ADMIN — BENZTROPINE MESYLATE 0.5 MG: 1 TABLET ORAL at 17:45

## 2018-02-08 RX ADMIN — BUSPIRONE HYDROCHLORIDE 5 MG: 10 TABLET ORAL at 20:29

## 2018-02-08 RX ADMIN — HEPARIN SODIUM 5000 UNITS: 5000 INJECTION, SOLUTION INTRAVENOUS; SUBCUTANEOUS at 20:29

## 2018-02-08 RX ADMIN — HYDROCODONE BITARTRATE AND ACETAMINOPHEN 1 TABLET: 7.5; 325 TABLET ORAL at 16:49

## 2018-02-08 RX ADMIN — PANTOPRAZOLE SODIUM 40 MG: 40 TABLET, DELAYED RELEASE ORAL at 06:15

## 2018-02-08 RX ADMIN — PERPHENAZINE 4 MG: 2 TABLET, FILM COATED ORAL at 08:09

## 2018-02-08 RX ADMIN — GABAPENTIN 400 MG: 400 CAPSULE ORAL at 08:09

## 2018-02-08 RX ADMIN — ASPIRIN 81 MG: 81 TABLET, COATED ORAL at 08:10

## 2018-02-08 RX ADMIN — AMLODIPINE BESYLATE 10 MG: 10 TABLET ORAL at 08:09

## 2018-02-08 NOTE — PLAN OF CARE
Problem: Patient Care Overview (Adult)  Goal: Plan of Care Review  Outcome: Ongoing (interventions implemented as appropriate)    Goal: Adult Individualization and Mutuality  Outcome: Ongoing (interventions implemented as appropriate)    Goal: Discharge Needs Assessment  Outcome: Ongoing (interventions implemented as appropriate)      Problem: Fall Risk (Adult)  Goal: Identify Related Risk Factors and Signs and Symptoms  Outcome: Ongoing (interventions implemented as appropriate)    Goal: Absence of Falls  Outcome: Ongoing (interventions implemented as appropriate)      Problem: Orthopaedic Fracture (Adult)  Goal: Signs and Symptoms of Listed Potential Problems Will be Absent or Manageable (Orthopaedic Fracture)  Outcome: Ongoing (interventions implemented as appropriate)

## 2018-02-08 NOTE — PROGRESS NOTES
"Adult Nutrition  Assessment/PES    Patient Name:  Liane Griffin  YOB: 1950  MRN: [id number]  Admit Date:  1/27/2018    Assessment Date:  2/8/2018        Reason for Assessment       02/08/18 1139    Reason for Assessment    Reason For Assessment/Visit follow up protocol    Time Spent (min) 20    Diagnosis Diagnosis   per notes this admission                Anthropometrics       02/08/18 1140    Anthropometrics (Special Considerations)    Height Used for Calculations 1.6 m (5' 3\")    Weight Used for Calculations 84.8 kg (186 lb 15.2 oz)   bed scale weight per charting 1/27            Labs/Tests/Procedures/Meds       02/08/18 1141    Labs/Tests/Procedures/Meds    Labs/Tests Review Reviewed                Nutrition Prescription Ordered       02/08/18 1141    Nutrition Prescription PO    Current PO Diet Regular    Common Modifiers Cardiac;Consistent Carbohydrate            Evaluation of Received Nutrient/Fluid Intake       02/08/18 1143    PO Evaluation    Number of Days PO Intake Evaluated --   PO intake recorded from (2/5 - 2/6)    Number of Meals 4    % PO Intake 81%          Problem/Interventions:        Problem 1       02/08/18 1143    Nutrition Diagnoses Problem 1    Problem 1 No Nutrition Diagnosis at this Time                Intervention Goal       02/08/18 1144    Intervention Goal    General Nutrition support treatment            Nutrition Intervention       02/08/18 1144    Nutrition Intervention    RD/Tech Action Follow Tx progress;Care plan reviewd              Education/Evaluation       02/08/18 1144    Monitor/Evaluation    Monitor PO intake;Per protocol        Electronically signed by:  Kacey Jacinto  02/08/18 11:44 AM  "

## 2018-02-08 NOTE — PROGRESS NOTES
[facility] Medicine Services  PROGRESS NOTE    Patient Name: Liane Griffin  : 1950  MRN: [id number]    Date of Admission: 2018  Length of Stay: 0  Primary Care Physician: No Known Provider    Subjective     CC: f/u AMS    HPI:  Sitting up in bed this morning, ate all of her breakfast. Has PT/OT exercise information next to her, says she is trying to work on them. Pain well controlled. No other complaints.     Review of Systems  Gen- No fevers, chills  CV- No chest pain, palpitations  Resp- No cough, dyspnea  GI- No N/V/D, abd pain    Otherwise ROS is negative except as mentioned in the HPI.    Objective     Vital Signs:   Temp:  [97.8 °F (36.6 °C)-98.5 °F (36.9 °C)] 98.5 °F (36.9 °C)  Heart Rate:  [54-63] 54  Resp:  [18-20] 20  BP: (122-152)/(56-72) 152/56        Physical Exam:  Constitutional: No acute distress, awake, alert and conversant.   HENT: NCAT, mucous membranes moist  Respiratory: CTAB without wheezes, no rales, normal effort  Cardiovascular: RRR, no murmurs, rubs, or gallops  Gastrointestinal: Positive bowel sounds, soft, nontender, nondistended  Musculoskeletal: R arm immobilized in sling  Psychiatric: Appropriate affect, cooperative  Neurologic: A&Ox3, interactive, follows commands;  Strength symmetric in all extremities  Skin: No rashes    Results Reviewed:  I have personally reviewed current lab, radiology, and data and agree.              Invalid input(s):  ALKPHOS, TROPONININT  Estimated Creatinine Clearance: 68.5 mL/min (by C-G formula based on Cr of 0.7).  No results found for: BNP  No results found for: PHART    Microbiology Results Abnormal     Procedure Component Value - Date/Time    Blood Culture - Blood, [[id number]]  (Normal) Collected:  18 1255    Lab Status:  Final result Specimen:  Blood from Arm, Right Updated:  18 1316     Blood Culture No growth at 5 days    Blood Culture - Blood, [[id number]]  (Normal) Collected:  18 1255    Lab  Status:  Final result Specimen:  Blood from Arm, Left Updated:  02/01/18 1316     Blood Culture No growth at 5 days    Influenza A & B, RT PCR - Swab, Nasopharynx [[id number]]  (Normal) Collected:  01/28/18 0542    Lab Status:  Final result Specimen:  Swab from Nasopharynx Updated:  01/28/18 1101     Influenza A PCR Not Detected     Influenza B PCR Not Detected    Influenza Antigen, Rapid - Swab, Nasopharynx [[id number]]  (Normal) Collected:  01/27/18 1305    Lab Status:  Final result Specimen:  Swab from Nasopharynx Updated:  01/27/18 1332     Influenza A Ag, EIA Negative     Influenza B Ag, EIA Negative        Imaging Results (last 24 hours)     ** No results found for the last 24 hours. **        I have reviewed the medications.    Assessment / Plan     Hospital Problem List     Immobility    CAD (coronary artery disease) (Chronic)    Diabetes mellitus, type II (Chronic)    HTN (hypertension) (Chronic)    HLD (hyperlipidemia) (Chronic)    Schizophrenia (Chronic)    Anemia    Tremor    Recent fracture of right humeral head        Brief Hospital Course to date:  Liane Griffin is a 67 y.o. female with PMH significant for HTN, hyperlipidemia, CAD, non-insulin dependent DMII and schizophrenia. She sustained a humeral head fracture on 1/19/18 and was treated at [facility] twice. No surgical intervention recommended. She presented to [facility] ED on 1/27/18 secondary to R arm and shoulder pain. When she was to be discharged from the ED, [facility] refused [facility] her back, stating that she needed more care than they could provide.     Assessment & Plan:  - Appreciate ortho evaluation. Recommend non-operative management and pain control. Non-weight bearing to RUE. Follow up with Dr. Guidry or her prior orthopedist @ [facility] in 2 weeks (approx. 2/12)  - Baseline mental status is unclear. She continues to be intermittently altered. Continue home psych medications. Better last two days.   - Anemia studies consistent  with iron deficiency anemia. Continue PO iron replacement.   - Using minimal SSI. Stopped accuchecks. Consider discontinuing home metformin at d/c or reducing dosage.    Case management following. Awaiting Medicaid pending bed. [facility] to possibly accept once insurance approval obtained.      DVT Prophylaxis:  [facility]    CODE STATUS: Full Code    Disposition: I expect the patient to be discharged to rehab when bed available ~1 day

## 2018-02-08 NOTE — PLAN OF CARE
Problem: Patient Care Overview (Adult)  Goal: Plan of Care Review   02/08/18 0341   Outcome Evaluation   Outcome Summary/Follow up Plan VSS, A&O. Pt c/o right arm and shoulder pain. Pain med requested every 4hrs. Pt slept and rested comfortably.

## 2018-02-08 NOTE — PLAN OF CARE
Problem: Patient Care Overview (Adult)  Goal: Plan of Care Review  Outcome: Revised   02/08/18 1741   Coping/Psychosocial Response Interventions   Plan Of Care Reviewed With patient   Patient Care Overview   Progress progress towards functional goals is fair   Outcome Evaluation   Outcome Summary/Follow up Plan Able to amb 220 ft w/ min. HHA LUE (R shldr sling), but noted mod SOB/fatigue, elev.HR to 103, incr. trunk flex & festinating gt. pattern as gt.progressed; all goals revised, as pt not progressing as expected; plans d/c to [facility]        Problem: Inpatient Physical Therapy  Goal: Bed Mobility Goal LTG- PT  Outcome: Revised   02/08/18 1741   Bed Mobility PT LTG   Bed Mobility PT LTG, Date Established 01/28/18   Bed Mobility PT LTG, Time to Achieve 2 wks   Bed Mobility PT LTG, Activity Type all bed mobility   Bed Mobility PT LTG, [place] Level supervision required   Bed Mobility PT LTG, Date Goal Reviewed 02/08/18   Bed Mobility PT LTG, Outcome goal revised   Bed Mobility PT LTG, Reason Goal Not Met progress slower than expected     Goal: Transfer Training Goal 1 LTG- PT  Outcome: Revised   01/28/18 1159 02/08/18 1741   Transfer Training PT LTG   Transfer Training PT LTG, Date Established --  01/28/18   Transfer Training PT LTG, Time to Achieve --  2 wks   Transfer Training PT LTG, Activity Type --  bed to chair /chair to bed;sit to stand/stand to sit   Transfer Training PT LTG, [place] Level independent --    Transfer Training PT LTG, Additional Goal --  NWB RUE (W/ R shldr sling)   Transfer Training PT LTG, Date Goal Reviewed --  02/08/18   Transfer Training PT LTG, Outcome --  goal revised   Transfer Training PT LTG, Reason Goal Not Met --  progress slower than expected     Goal: Gait Training Goal LTG- PT  Outcome: Revised   01/28/18 1159 02/07/18 1325 02/08/18 1741   Gait Training PT LTG   Gait Training Goal PT LTG, Date Established 01/28/18 --  --    Gait Training Goal PT LTG, [place]  Level conditional independence;supervision required --  --    Gait Training Goal PT LTG, Assist Device other (see comments) --  --    Gait Training Goal PT LTG, Distance to Achieve hha to 500 --  --    Gait Training Goal PT LTG, Date Goal Reviewed --  02/07/18 --    Gait Training Goal PT LTG   01/28/18 1159 02/08/18 1741   Gait Training PT LTG   Gait Training Goal PT LTG, Date Established 01/28/18 --    Gait Training Goal PT LTG, Time to Achieve --  2 wks   Gait Training Goal PT LTG, [place] Level --  minimum assist (75% patient effort)   Gait Training Goal PT LTG, Assist Device --  other (see comments)  (stable VS; min HHA LUE)   Gait Training Goal PT LTG, Distance to Achieve --  400   Gait Training Goal PT LTG, Date Goal Reviewed --  02/08/18   Gait Training Goal PT LTG, Outcome --  goal revised   Gait Training Goal PT LTG, Reason Goal Not Met --  progress slower than expected   , Outcome --  --  goal ongoing

## 2018-02-09 VITALS
HEART RATE: 71 BPM | WEIGHT: 187 LBS | DIASTOLIC BLOOD PRESSURE: 98 MMHG | TEMPERATURE: 99 F | RESPIRATION RATE: 16 BRPM | OXYGEN SATURATION: 96 % | BODY MASS INDEX: 33.13 KG/M2 | HEIGHT: 63 IN | SYSTOLIC BLOOD PRESSURE: 115 MMHG

## 2018-02-09 PROCEDURE — 96372 THER/PROPH/DIAG INJ SC/IM: CPT

## 2018-02-09 PROCEDURE — G0378 HOSPITAL OBSERVATION PER HR: HCPCS

## 2018-02-09 PROCEDURE — 97530 THERAPEUTIC ACTIVITIES: CPT

## 2018-02-09 PROCEDURE — 99217 PR OBSERVATION CARE DISCHARGE MANAGEMENT: CPT | Performed by: PHYSICIAN ASSISTANT

## 2018-02-09 PROCEDURE — 25010000002 HEPARIN (PORCINE) PER 1000 UNITS: Performed by: NURSE PRACTITIONER

## 2018-02-09 RX ORDER — MAGNESIUM HYDROXIDE/ALUMINUM HYDROXICE/SIMETHICONE 120; 1200; 1200 MG/30ML; MG/30ML; MG/30ML
10 SUSPENSION ORAL EVERY 6 HOURS PRN
Start: 2018-02-09

## 2018-02-09 RX ORDER — PSEUDOEPHEDRINE HCL 30 MG
100 TABLET ORAL DAILY
Start: 2018-02-09

## 2018-02-09 RX ORDER — FERROUS SULFATE 325(65) MG
325 TABLET ORAL
Start: 2018-02-10

## 2018-02-09 RX ORDER — TRAMADOL HYDROCHLORIDE 50 MG/1
50 TABLET ORAL 2 TIMES DAILY PRN
Qty: 10 TABLET | Refills: 0 | Status: SHIPPED | OUTPATIENT
Start: 2018-02-09

## 2018-02-09 RX ORDER — GABAPENTIN 400 MG/1
400 CAPSULE ORAL 2 TIMES DAILY
Qty: 10 CAPSULE | Refills: 0 | Status: SHIPPED | OUTPATIENT
Start: 2018-02-09

## 2018-02-09 RX ORDER — HYDROCODONE BITARTRATE AND ACETAMINOPHEN 5; 325 MG/1; MG/1
1 TABLET ORAL EVERY 8 HOURS PRN
Qty: 10 TABLET | Refills: 0 | Status: SHIPPED | OUTPATIENT
Start: 2018-02-09 | End: 2018-02-19

## 2018-02-09 RX ORDER — BISACODYL 5 MG/1
5 TABLET, DELAYED RELEASE ORAL DAILY PRN
Start: 2018-02-09

## 2018-02-09 RX ORDER — ASCORBIC ACID 500 MG
500 TABLET ORAL DAILY
Start: 2018-02-10

## 2018-02-09 RX ORDER — BISACODYL 10 MG
10 SUPPOSITORY, RECTAL RECTAL DAILY PRN
Start: 2018-02-09

## 2018-02-09 RX ORDER — CLONAZEPAM 0.5 MG/1
0.5 TABLET ORAL 2 TIMES DAILY PRN
Qty: 10 TABLET | Refills: 0 | Status: SHIPPED | OUTPATIENT
Start: 2018-02-09

## 2018-02-09 RX ADMIN — FLUOXETINE 10 MG: 10 CAPSULE ORAL at 08:26

## 2018-02-09 RX ADMIN — BENZTROPINE MESYLATE 0.5 MG: 1 TABLET ORAL at 16:36

## 2018-02-09 RX ADMIN — PERPHENAZINE 4 MG: 2 TABLET, FILM COATED ORAL at 08:26

## 2018-02-09 RX ADMIN — Medication 325 MG: at 09:09

## 2018-02-09 RX ADMIN — CLOPIDOGREL BISULFATE 75 MG: 75 TABLET ORAL at 08:26

## 2018-02-09 RX ADMIN — OXYCODONE HYDROCHLORIDE AND ACETAMINOPHEN 500 MG: 500 TABLET ORAL at 08:27

## 2018-02-09 RX ADMIN — FLUTICASONE PROPIONATE 2 SPRAY: 50 SPRAY, METERED NASAL at 08:26

## 2018-02-09 RX ADMIN — GABAPENTIN 400 MG: 400 CAPSULE ORAL at 08:26

## 2018-02-09 RX ADMIN — LIDOCAINE 1 PATCH: 50 PATCH CUTANEOUS at 08:27

## 2018-02-09 RX ADMIN — BUSPIRONE HYDROCHLORIDE 5 MG: 10 TABLET ORAL at 08:26

## 2018-02-09 RX ADMIN — PANTOPRAZOLE SODIUM 40 MG: 40 TABLET, DELAYED RELEASE ORAL at 06:38

## 2018-02-09 RX ADMIN — HYDROCODONE BITARTRATE AND ACETAMINOPHEN 1 TABLET: 7.5; 325 TABLET ORAL at 09:08

## 2018-02-09 RX ADMIN — ASPIRIN 81 MG: 81 TABLET, COATED ORAL at 08:26

## 2018-02-09 RX ADMIN — AMLODIPINE BESYLATE 10 MG: 10 TABLET ORAL at 08:26

## 2018-02-09 RX ADMIN — HEPARIN SODIUM 5000 UNITS: 5000 INJECTION, SOLUTION INTRAVENOUS; SUBCUTANEOUS at 08:26

## 2018-02-09 NOTE — PLAN OF CARE
Problem: Patient Care Overview (Adult)  Goal: Plan of Care Review  Outcome: Ongoing (interventions implemented as appropriate)    Goal: Adult Individualization and Mutuality  Outcome: Ongoing (interventions implemented as appropriate)    Goal: Discharge Needs Assessment  Outcome: Ongoing (interventions implemented as appropriate)      Problem: Fall Risk (Adult)  Goal: Identify Related Risk Factors and Signs and Symptoms  Outcome: Ongoing (interventions implemented as appropriate)    Goal: Absence of Falls  Outcome: Ongoing (interventions implemented as appropriate)

## 2018-02-09 NOTE — PLAN OF CARE
Problem: Patient Care Overview (Adult)  Goal: Plan of Care Review  Outcome: Revised   02/09/18 1202   Coping/Psychosocial Response Interventions   Plan Of Care Reviewed With patient   Outcome Evaluation   Outcome Summary/Follow up Plan Pt progress note completed and POC revised to reflect pt's current function. Pt presents with delayed responses and does better with increased time to allow responses. OT to follow per POC for R UE HEP and ADLs. D/C plan is to [facility].       Problem: Inpatient Occupational Therapy  Goal: Bed Mobility Goal LTG- OT  Outcome: Revised   02/09/18 1202   Bed Mobility OT LTG   Bed Mobility OT LTG, Date Established 02/09/18   Bed Mobility OT LTG, Time to Achieve by discharge   Bed Mobility OT LTG, Activity Type supine to sit/sit to supine   Bed Mobility OT LTG, [place] Level minimum assist (75% patient effort);verbal cues required;nonverbal cues required (demo/gesture)   Bed Mobility OT LTG, Assist Device bed rails     Goal: Transfer Training Goal 1 LTG- OT  Outcome: Revised   02/09/18 1202   Transfer Training OT LTG   Transfer Training OT LTG, Date Established 02/09/18   Transfer Training OT LTG, Time to Achieve by discharge   Transfer Training OT LTG, Activity Type sit to stand/stand to sit;toilet   Transfer Training OT LTG, [place] Level contact guard assist;verbal cues required   Transfer Training OT LTG, Additional Goal to BR, ETS      Goal: UB Dressing Goal LTG- OT  Outcome: Revised   02/09/18 1202   UB Dressing OT LTG   UB Dressing Goal OT LTG, Date Established 02/09/18   UB Dressing Goal OT LTG, Time to Achieve by discharge   UB Dressing Goal OT LTG, [place] Level moderate assist (50% patient effort);verbal cues required;nonverbal cues required (demo/gesture)   UB Dressing Goal OT LTG, Additional Goal with silverio-dressing     Goal: Strength Goal LTG- OT  Outcome: Ongoing (interventions implemented as appropriate)   01/31/18 1417 02/09/18 1202   Strength OT LTG    Strength Goal OT LTG, Date Established 01/31/18 --    Strength Goal OT LTG, Time to Achieve 1 wk --    Strength Goal OT LTG, Measure to Achieve Pt. will participated AAROM/AROM BUE (R elbow to hand only) 10-15 reps each session to support ADL and transfer indep. --    Strength Goal OT LTG, Outcome --  goal partially met  (progressing; maintain goal)

## 2018-02-09 NOTE — THERAPY TREATMENT NOTE
"Acute Care - Occupational Therapy Progress Note  [facility]     Patient Name: Liane Griffin  : 1950  MRN: [id number]  Today's Date: 2018  Onset of Illness/Injury or Date of Surgery Date: 18  Date of Referral to OT: 18  Referring Physician: JOE Mckeon      Admit Date: 2018    Visit Dx:     ICD-10-CM ICD-9-CM   1. Torticollis, acute M43.6 723.5   2. 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 Physical deconditioning R53.81 799.3   4. Impaired mobility and ADLs Z74.09 799.89   5. Impaired functional mobility, balance, gait, and endurance Z74.09 V49.89     Patient Active Problem List   Diagnosis   • Immobility   • CAD (coronary artery disease)   • Diabetes mellitus, type II   • HTN (hypertension)   • HLD (hyperlipidemia)   • Schizophrenia   • Anemia   • Tremor   • Recent fracture of right humeral head             Adult Rehabilitation Note       18 1125 18 1654 18 1300    Rehab Assessment/Intervention    Discipline occupational therapist  -TB physical therapist  -DM physical therapy assistant  -AS    Document Type progress note  -TB progress note  -DM therapy note (daily note)  -AS    Subjective Information agree to therapy;complains of;weakness;pain  -TB agree to therapy;complains of;pain;weakness   \"pain pill earlier for R arm\";nsg ok'd UIC  -DM agree to therapy;complains of;pain  -AS    Patient Effort, Rehab Treatment good  -TB good  -DM good  -AS    Symptoms Noted During/After Treatment none  -TB fatigue;increased pain;significant change in vital signs  -DM none  -AS    Symptoms Noted Comment Delayed responses  -TB      Precautions/Limitations fall precautions;shoulder precautions;non-weight bearing status   NWB R UE, sling for comfort only, pendulum HEP as imelda.  -TB fall precautions;brace on when up;other (see comments)   NWB R UE (S/P HUM.head fx, w/R arm sling);schizo;tremors  -DM fall " precautions;shoulder precautions;non-weight bearing status   NWB R UE Fx with sling for comfort.  -AS    Specific Treatment Considerations Pendulum exercises R UE as tolerated per ortho 2/6/2018  -TB place washcloth under shldr sling strap for comfort around neck  -DM     Recorded by [TB] Doris Marsh, OT [DM] Matilde Dougherty, PT [AS] Katerine Laguna, KORINA    Vital Signs    Pre Systolic BP Rehab  118  -DM     Pre Treatment Diastolic BP  55  -DM     Post Systolic BP Rehab  112  -DM     Post Treatment Diastolic BP  77  -DM     Pretreatment Heart Rate (beats/min)  69   irreg.  -DM     Intratreatment Heart Rate (beats/min)  103  -DM     Posttreatment Heart Rate (beats/min)  81  -DM     Pre SpO2 (%)  96  -DM     O2 Delivery Pre Treatment  room air  -DM     Intra SpO2 (%)  91  -DM     O2 Delivery Intra Treatment  room air  -DM     Post SpO2 (%)  92  -DM     O2 Delivery Post Treatment  room air  -DM     Pre Patient Position Supine  -TB Supine  -DM     Intra Patient Position Standing  -TB Standing  -DM     Post Patient Position Sitting  -TB Sitting   respirex 750 cc  -DM     Recorded by [TB] Doris Marsh, OT [DM] Matilde Dougherty, PT     Pain Assessment    Pain Assessment Williamson-Mcallister FACES  -TB Williamson-Baker FACES  -DM Williamson-Mcallister FACES  -AS    Williamson-Mcallister FACES Pain Rating 2  -TB 4  -DM 4  -AS    Post Pain Score   2  -AS    Pain Type  Acute pain  -DM Acute pain  -AS    Pain Location Shoulder  -TB Shoulder  -DM Shoulder  -AS    Pain Orientation Right  -TB Right  -DM Right  -AS    Pain Radiating Towards elbow  -TB  elbow  -AS    Pain Descriptors  Aching  -DM     Pain Frequency  Constant/continuous  -DM     Patient's Stated Pain Goal  No pain  -DM     Pain Intervention(s) Ambulation/increased activity;Repositioned  -TB Medication (See MAR);Repositioned;Rest  -DM Repositioned;Ambulation/increased activity  -AS    Response to Interventions Pt tolerated HEP  -TB tolerated  -DM     Recorded by [TB] Doris Larkin  Maximo, OT [DM] Matilde Dougherty, PT [AS] Katerine Laguna, PTA    Vision Assessment/Intervention    Visual Impairment  WNL  -DM     Recorded by  [DM] Matilde Dougherty PT     Cognitive Assessment/Intervention    Current Cognitive/Communication Assessment impaired  -TB impaired  -DM impaired  -AS    Orientation Status oriented to;person;place  -TB oriented to;person;place  -DM oriented to;person;place  -AS    Follows Commands/Answers Questions able to follow single-step instructions;needs cueing;needs repetition;75% of the time  -TB 75% of the time;100% of the time;able to follow single-step instructions;needs cueing;needs increased time;needs repetition   schizo;not following safety cues consistently   -% of the time;able to follow single-step instructions  -AS    Personal Safety decreased awareness, need for assist;decreased insight to deficits  -TB mild impairment;decreased awareness, need for assist;decreased awareness, need for safety;decreased insight to deficits;impulsive  -DM mild impairment;decreased awareness, need for assist;decreased awareness, need for safety  -AS    Personal Safety Interventions fall prevention program maintained;gait belt;nonskid shoes/slippers when out of bed   exit alarms  -TB fall prevention program maintained;gait belt;nonskid shoes/slippers when out of bed  -DM fall prevention program maintained;gait belt;nonskid shoes/slippers when out of bed;other (see comments)   exit alarm  -AS    Recorded by [TB] Doris Marsh, OT [DM] Matilde Dougherty, PT [AS] Katerine Laguna, PTA    Bed Mobility, Assessment/Treatment    Bed Mobility, Assistive Device head of bed elevated;bed rails  -TB head of bed elevated   not using L bedrail as cued;insisted on pulling on PT's hand  -DM     Bed Mob, Supine to Sit, [place] moderate assist (50% patient effort);verbal cues required  -TB maximum assist (25% patient effort);verbal cues required   Rshldr sling reposn.properly,then neck  strap pad w/washcloth  -DM verbal cues required;maximum assist (25% patient effort)  -AS    Bed Mob, Sit to Supine, [place] not tested  -TB  moderate assist (50% patient effort)  -AS    Bed Mobility, Safety Issues decreased use of arms for pushing/pulling;decreased use of legs for bridging/pushing  -TB decreased use of arms for pushing/pulling;decreased use of legs for bridging/pushing  -DM decreased use of arms for pushing/pulling  -AS    Bed Mobility, Impairments strength decreased;pain  -TB strength decreased;pain  -DM strength decreased;pain  -AS    Bed Mobility, Comment cues to sequence, increased time for response  -TB cues for HP, seq  -DM assist for trunk to reach EOB  -AS    Recorded by [TB] Doris Marsh OT [DM] Matilde Dougherty, PT [AS] Katerine Laguna, KORINA    Transfer Assessment/Treatment    Transfers, Bed-Chair [place] minimum assist (75% patient effort);verbal cues required  -TB      Transfers, Sit-Stand [place] minimum assist (75% patient effort);verbal cues required  -TB minimum assist (75% patient effort);verbal cues required  -DM verbal cues required;minimum assist (75% patient effort)  -AS    Transfers, Stand-Sit [place] minimum assist (75% patient effort);verbal cues required  -TB minimum assist (75% patient effort);verbal cues required  -DM verbal cues required;minimum assist (75% patient effort)  -AS    Transfers, Sit-Stand-Sit, Assist Device --   gait belt and L UE supported  -TB other (see comments)   gt belt; LUE supp  -DM other (see comments)   L UE support  -AS    Toilet Transfer, [place]   verbal cues required;contact guard assist  -AS    Toilet Transfer, Assistive Device   other (see comments)   L UE support  -AS    Transfer, Safety Issues loses balance backward;balance decreased during turns  -TB sequencing ability decreased;weight-shifting ability decreased;loses balance backward  -DM sequencing ability decreased;step length decreased;balance  decreased during turns;weight-shifting ability decreased  -AS    Transfer, Impairments strength decreased;impaired balance;pain  -TB strength decreased;impaired balance;pain  -DM strength decreased;impaired balance  -AS    Transfer, Comment encouragement, cues for sequencing/hand placement and increased time  -TB cues for HP, seq, WS over C of G  -DM verbal cues to reach back with left hand for bed and commode  -AS    Recorded by [TB] Doirs Marsh OT [DM] Matilde Dougherty, PT [AS] Katerine Laguna, KORINA    Gait Assessment/Treatment    Gait, [place] Level  minimum assist (75% patient effort);verbal cues required   min HHA LUE (Rshldr sling in place);4stand.rests(modSOB/fat.  -DM verbal cues required;minimum assist (75% patient effort)  -AS    Gait, Assistive Device  other (see comments)   gt belt; LUE supp.  -DM     Gait, Distance (Feet)  220   ; rest to sit   -  -AS    Gait, Gait Pattern Analysis  swing-to gait  -DM     Gait, Gait Deviations  isaiah decreased;decreased heel strike;festinating;narrow base;step length decreased;weight-shifting ability decreased  -DM antalgic;isaiah decreased;forward flexed posture;step length decreased   fast pace  -AS    Gait, Safety Issues  other (see comments)   NWB RUE  -DM weight-shifting ability decreased;balance decreased during turns;sequencing ability decreased;step length decreased  -AS    Gait, Impairments  strength decreased;impaired balance;postural control impaired;pain   incr. trunk flex & shuffling/cogwheeling as gt.progressed  -DM strength decreased;impaired balance  -AS    Gait, Comment  cues for incr. step length,controlled iasiah, trunk ext, PLB  -DM verbal cues to slow down and to take time with directional changes  -AS    Recorded by  [DM] Matilde Dougherty, PT [AS] Katerine Laguna, PTA    Functional Mobility    Functional Mobility- Ind. Level minimum assist (75% patient effort);verbal cues required  -TB      Functional Mobility-  Device --   gait belt and L UE support  -TB      Functional Mobility-Distance (Feet) 15  -TB      Functional Mobility- Safety Issues loses balance backward;balance decreased during turns  -TB      Functional Mobility- Comment in room, to chair  -TB      Recorded by [TB] Doris Marsh OT      Upper Body Bathing Assessment/Training    UB Bathing Assess/Train, Comment Education reinforced for R UE axilla care  -TB      Recorded by [TB] Doris Marsh OT      Upper Body Dressing Assessment/Training    UB Dressing Assess/Train, Clothing Type doffing:;donning:;hospital gown   sling  -TB      UB Dressing Assess/Train, Position sitting  -TB      UB Dressing Assess/Train, [place] maximum assist (25% patient effort);verbal cues required  -TB      UB Dressing Assess/Train, Impairments ROM decreased;strength decreased;pain  -TB      UB Dressing Assess/Train, Comment Education reinforced for R UE silverio dressing and sling mgmt  -TB      Recorded by [TB] Doris Marsh OT      Grooming Assessment/Training    Grooming Assess/Train, Position sitting  -TB      Grooming Assess/Train, Indepen Level set up required  -TB      Grooming Assess/Train, Comment to comb hair  -TB      Recorded by [TB] Doris Marsh, OT      Motor Skills/Interventions    Additional Documentation  Balance Skills Training (Group)  -DM     Recorded by  [DM] Matilde Dougherty, PT     Balance Skills Training    Sitting-Level of Assistance Close supervision  -TB Close supervision  -DM     Sitting-Balance Support Feet supported  -TB Feet supported  -DM     Sitting-Balance Activities Forward lean   ADL tasks  -TB Forward lean;Trunk control activities   LE exer; scooting  -DM     Sitting # of Minutes  5  -DM     Standing-Level of Assistance Contact guard  -TB Contact guard  -DM     Static Standing Balance Support Left upper extremity supported   gait belt  -TB No upper extremity supported  -DM     Standing-Balance Activities  Weight  "Shift A-P  -DM     Standing Balance # of Minutes  1  -DM     Gait Balance-Level of Assistance  Minimum assistance  -DM     Gait Balance Support  Left upper extremity supported  -DM     Gait Balance Activities  scanning environment R/L;side-stepping  -DM     Recorded by [TB] Doris Marsh OT [DM] Matilde Dougherty, PT     Therapy Exercises    Bilateral Lower Extremities  AROM:;10 reps;sitting;ankle pumps/circles;heel slides;hip abduction/adduction;hip ER;hip flexion;hip IR;LAQ;quad sets;glut sets  -DM --   deferred per patient was \"too cold\"   -AS    Right Upper Extremity AAROM:;10 reps;hand pumps;elbow flexion/extension;PROM:;shoulder pendulum   wrist f/e  -TB      Left Upper Extremity  AROM:;10 reps;sitting;elbow flexion/extension;shoulder abduction/adduction;shoulder extension/flexion  -DM     Recorded by [TB] Doris Marsh OT [DM] Matilde Dougherty, PT [AS] Katerine Laguna, KORINA    Positioning and Restraints    Pre-Treatment Position in bed  -TB in bed  -DM in bed  -AS    Post Treatment Position chair  -TB chair  -DM bed  -AS    In Bed   supine;call light within reach;encouraged to call for assist;exit alarm on   with sling  -AS    In Chair reclined;call light within reach;encouraged to call for assist;exit alarm on;RUE elevated;legs elevated  -TB notified nsg;reclined;call light within reach;encouraged to call for assist;exit alarm on;RUE elevated;LUE elevated;waffle cushion;legs elevated  -DM     Recorded by [TB] Doris Marsh OT [DM] Matilde Dougherty, PT [AS] Katerine Laguna, KORINA      02/07/18 1110 02/06/18 1330       Rehab Assessment/Intervention    Discipline occupational therapist  -TB physical therapy assistant  -UD     Document Type therapy note (daily note)  -TB therapy note (daily note)  -UD     Subjective Information agree to therapy;complains of;pain  -TB agree to therapy;complains of;weakness;pain  -UD     Patient Effort, Rehab Treatment good  -TB good  -UD     Symptoms " Noted During/After Treatment increased pain  -TB fatigue  -UD     Precautions/Limitations fall precautions;shoulder precautions;other (see comments);non-weight bearing status   Non-op R UE Fx for sling for comfort; NWB R UE  -TB      Specific Treatment Considerations R Sling for comfort/protection; NWB R UE  -TB sling rt shoulder  -UD     Patient Response to Treatment Ortho progress note dated 2/6/2018 added Pendulum exercises as tolerated  -TB      Recorded by [TB] Doris Marsh, OT [UD] Loli León PTA     Vital Signs    O2 Delivery Post Treatment room air  -TB room air  -UD     Pre Patient Position Supine  -TB Supine  -UD     Intra Patient Position Sitting  -TB Standing  -UD     Post Patient Position Supine  -TB Supine  -UD     Recorded by [TB] Doris Marsh, OT [UD] Loli León PTA     Pain Assessment    Pain Assessment Williamson-Mcallister FACES  -TB Williamson-Baker FACES  -UD     Williamson-Mcallister FACES Pain Rating 4  -TB 2  -UD     Pain Score  2  -UD     Post Pain Score 2  -TB 2  -UD     Pain Type Acute pain  -TB Acute pain  -UD     Pain Location Shoulder  -TB Shoulder  -UD     Pain Orientation Right  -TB Right  -UD     Pain Radiating Towards elbow  -TB      Pain Intervention(s) Ambulation/increased activity;Repositioned   HEP  -TB Repositioned  -UD     Response to Interventions Pt tolerated HEP well  -TB      Recorded by [TB] Doris Marsh, OT [UD] Loli León PTA     Cognitive Assessment/Intervention    Current Cognitive/Communication Assessment impaired  -TB      Orientation Status oriented to;person;place;situation  -TB      Follows Commands/Answers Questions able to follow single-step instructions;needs cueing;75% of the time  -TB      Personal Safety decreased awareness, need for assist;decreased insight to deficits  -TB      Personal Safety Interventions fall prevention program maintained;gait belt;nonskid shoes/slippers when out of bed   exit alarms; NWB R UE, R UE sling for comfort  -TB       Recorded by [TB] Doris Marsh, OT      Bed Mobility, Assessment/Treatment    Bed Mob, Supine to Sit, [place] moderate assist (50% patient effort);verbal cues required  -TB moderate assist (50% patient effort);2 person assist required  -UD     Bed Mob, Sit to Supine, [place] maximum assist (25% patient effort);verbal cues required  -TB moderate assist (50% patient effort);2 person assist required  -UD     Bed Mobility, Safety Issues decreased use of arms for pushing/pulling;decreased use of legs for bridging/pushing  -TB      Bed Mobility, Impairments strength decreased;pain;impaired balance  -TB      Recorded by [TB] Doris Marsh OT [UD] Loli León PTA     Transfer Assessment/Treatment    Transfers, Sit-Stand [place]  minimum assist (75% patient effort);2 person assist required  -UD     Transfers, Stand-Sit [place]  minimum assist (75% patient effort);2 person assist required  -UD     Recorded by  [UD] Loli León PTA     Gait Assessment/Treatment    Gait, [place] Level  contact guard assist;2 person assist required  -UD     Gait, Distance (Feet)  400   goes too fast   -UD     Gait, Safety Issues  --   leans forward  -UD     Gait, Impairments  strength decreased;impaired balance  -UD     Gait, Comment  --   cues for posture  -UD     Recorded by  [UD] Loli León PTA     Upper Body Bathing Assessment/Training    UB Bathing Assess/Train, Comment Education reinforced for R UE axilla care via pendulum mvmts  -TB      Recorded by [TB] Doris Marsh OT      Upper Body Dressing Assessment/Training    UB Dressing Assess/Train, Clothing Type doffing:;donning:   Sling  -TB      UB Dressing Assess/Train, Position sitting  -TB      UB Dressing Assess/Train, [place] maximum assist (25% patient effort);verbal cues required  -TB      UB Dressing Assess/Train, Impairments ROM decreased;strength decreased;pain  -TB      UB Dressing Assess/Train, Comment Education  reinforced for R UE silverio-dressing and sling mgmt  -TB      Recorded by [TB] Doris Marsh OT      Balance Skills Training    Sitting-Level of Assistance Close supervision  -TB Close supervision  -UD     Sitting-Balance Support Feet supported  -TB      Sitting-Balance Activities Forward lean   Pendulum HEP  -TB      Sitting # of Minutes 20  -TB      Recorded by [TB] Doris Marsh OT [UD] Loli León PTA     Therapy Exercises    Bilateral Lower Extremities  AROM:;10 reps;sitting  -UD     Right Upper Extremity AAROM:;10 reps;shoulder rolls/shrugs;shoulder protraction/retraction;elbow flexion/extension;pronation/supination;hand pumps;PROM:;shoulder pendulum  -TB      RUE Resistance manual resistance- minimal   Pink Foam Block for hand pumps x10 reps  -TB      Recorded by [TB] Doris Marsh OT [UD] Loli León PTA     Positioning and Restraints    Pre-Treatment Position in bed  -TB in bed  -UD     Post Treatment Position bed  -TB bed  -UD     In Bed notified nsg;supine;call light within reach;encouraged to call for assist;exit alarm on;RUE elevated   R sling intact; education reinforced for NWB R UE  -TB notified nsg;supine;call light within reach;exit alarm on;side rails up x2  -UD     Recorded by [TB] Doris Marsh OT [UD] Loli León PTA       User Key  (r) = Recorded By, (t) = Taken By, (c) = Cosigned By    Initials Name Effective Dates    TB Doris Marsh OT 06/22/15 -     UD Loli León, PTA 06/22/15 -     DM Matilde Dougherty, PT 06/19/15 -     AS Katerine Laguna, PTA 06/22/15 -                 OT Goals       02/09/18 1202 02/07/18 1153 02/05/18 1027    Bed Mobility OT LTG    Bed Mobility OT LTG, Date Established 02/09/18  -TB      Bed Mobility OT LTG, Time to Achieve by discharge  -TB      Bed Mobility OT LTG, Activity Type supine to sit/sit to supine  -TB      Bed Mobility OT LTG, [place] Level minimum assist (75% patient effort);verbal cues  required;nonverbal cues required (demo/gesture)  -TB      Bed Mobility OT LTG, Assist Device bed rails  -TB      Bed Mobility OT LTG, Outcome  goal ongoing   Mod A  -TB goal ongoing   Pt. already up in chair with PT.  -SHARON    Transfer Training OT LTG    Transfer Training OT LTG, Date Established 02/09/18  -TB      Transfer Training OT LTG, Time to Achieve by discharge  -TB      Transfer Training OT LTG, Activity Type sit to stand/stand to sit;toilet  -TB      Transfer Training OT LTG, [place] Level contact guard assist;verbal cues required  -TB      Transfer Training OT LTG, Additional Goal to BR, ETS   -TB      Transfer Training OT LTG, Outcome  goal ongoing  -TB goal ongoing   pt. just up with PT declined  -SHARON    Strength OT LTG    Strength Goal OT LTG, Outcome goal partially met   progressing; maintain goal  -TB goal partially met   met x10 reps  -TB goal partially met   met this session, cont to follow.  -SHARON    UB Dressing OT LTG    UB Dressing Goal OT LTG, Date Established 02/09/18  -TB      UB Dressing Goal OT LTG, Time to Achieve by discharge  -TB      UB Dressing Goal OT LTG, [place] Level moderate assist (50% patient effort);verbal cues required;nonverbal cues required (demo/gesture)  -TB      UB Dressing Goal OT LTG, Additional Goal with silverio-dressing  -TB      UB Dressing Goal OT LTG, Outcome  goal ongoing   Max A   -TB goal ongoing   Pt. wanting to stay position was in.  -SHARON      02/02/18 1349 01/31/18 1417 01/31/18 1415    Bed Mobility OT LTG    Bed Mobility OT LTG, Outcome goal ongoing  -AC  --   Pt. declined  -SHARON    Transfer Training OT LTG    Transfer Training OT LTG, Outcome goal ongoing  -AC  goal ongoing   per pt. just in bed,  declined  -SHARON    Strength OT LTG    Strength Goal OT LTG, Date Established  01/31/18  -SHARON     Strength Goal OT LTG, Time to Achieve  1 wk  -SHARON     Strength Goal OT LTG, Measure to Achieve  Pt. will participated AAROM/AROM BUE (R elbow to hand only) 10-15 reps each  session to support ADL and transfer indep.  -SHARON     Strength Goal OT LTG, Outcome goal partially met   met today for 10reps  -AC goal ongoing  -SHARON     UB Dressing OT LTG    UB Dressing Goal OT LTG, Outcome goal ongoing  -AC  goal ongoing   pt. declined to sit up to address  -SHARON      01/29/18 1503          Bed Mobility OT LTG    Bed Mobility OT LTG, Time to Achieve 5 days  -ST      Bed Mobility OT LTG, Activity Type supine to sit/sit to supine  -ST      Bed Mobility OT LTG, [place] Level contact guard assist  -ST      Bed Mobility OT LTG, Assist Device bed rails  -ST      Bed Mobility OT LTG, Outcome goal ongoing  -ST      Transfer Training OT LTG    Transfer Training OT LTG, Time to Achieve 5 days  -ST      Transfer Training OT LTG, Activity Type bed to chair /chair to bed;toilet  -ST      Transfer Training OT LTG, [place] Level supervision required  -ST      Transfer Training OT LTG, Assist Device cane, straight  -ST      Transfer Training OT LTG, Additional Goal SPC if needed  -ST      Transfer Training OT LTG, Outcome goal ongoing  -ST      UB Dressing OT LTG    UB Dressing Goal OT LTG, Time to Achieve 5 days  -ST      UB Dressing Goal OT LTG, [place] Level minimum assist (75% patient effort)  -ST      UB Dressing Goal OT LTG, Additional Goal with silverio technique  -ST      UB Dressing Goal OT LTG, Outcome goal ongoing  -ST        User Key  (r) = Recorded By, (t) = Taken By, (c) = Cosigned By    Initials Name Provider Type    TB Doris Marsh, OT Occupational Therapist    SHARON Williamson, OT Occupational Therapist    YAIR Rodriguez, OT Occupational Therapist    ST Cindi Marroquin OTR Occupational Therapist          Occupational Therapy Education     Title: PT OT SLP Therapies (Active)     Topic: Occupational Therapy (Done)     Point: ADL training (Done)    Description: Instruct learner(s) on proper safety adaptation and remediation techniques during self care or transfers.   Instruct in  proper use of assistive devices.    Learning Progress Summary    Learner Readiness Method Response Comment Documented by Status   Patient Acceptance E,H,D,TB VU,NR Education reinforced for R UE: precautions/NWB, HEP, axilla care, silverio-dressing and sling mgmt  02/09/18 1200 Done    Acceptance E,D,TB VU,NR Education reinforced for R UE: NWB precautions, sling for comfort, HEP, positioning, axilla care and silverio-dressing strategies TB 02/07/18 1152 Done    Acceptance E VU compensatory strategy for LBD, reviewed UE HEP AC 02/02/18 1348 Done    Eager E,D DU,NR  UD 02/01/18 1142 Done    Acceptance E VU  KS 02/01/18 0934 Done    Acceptance E,D VU,NR UE ROM and LE ROM exer. to support ADL and  mobility indep  01/31/18 1414 Done    Acceptance E,TB,JAN MCCORMICK,DU role of OT, benfits of activity, WB'ing status, POC, transfers, bed mobility, safety ST 01/29/18 1502 Done               Point: Home exercise program (Done)    Description: Instruct learner(s) on appropriate technique for monitoring, assisting and/or progressing therapeutic exercises/activities.    Learning Progress Summary    Learner Readiness Method Response Comment Documented by Status   Patient Acceptance E,H,D,TB VU,NR Education reinforced for R UE: precautions/NWB, HEP, axilla care, silverio-dressing and sling mgmt  02/09/18 1200 Done    Acceptance E,D,TB VU,NR Education reinforced for R UE: NWB precautions, sling for comfort, HEP, positioning, axilla care and silverio-dressing strategies  02/07/18 1152 Done    Acceptance E,D,H NR Pt. still needing constant cues with UE ROM exer. following priorly issued handout.  Encouragement for pt. to try to do more when therapy not there due to weakness R elbow, wrist and hand. SHARON 02/05/18 1026 Active    Eager E,D DU,NR  UD 02/01/18 1142 Done    Acceptance E VU  KS 02/01/18 0934 Done    Acceptance E,D VU,NR UE ROM and LE ROM exer. to support ADL and  mobility indep  01/31/18 1414 Done    Acceptance E,TB,JAN VU,DU role of OT,  benfits of activity, WB'ing status, POC, transfers, bed mobility, safety  01/29/18 1502 Done               Point: Precautions (Done)    Description: Instruct learner(s) on prescribed precautions during self-care and functional transfers.    Learning Progress Summary    Learner Readiness Method Response Comment Documented by Status   Patient Acceptance E,H,D,TB VU,NR Education reinforced for R UE: precautions/NWB, HEP, axilla care, silverio-dressing and sling mgmt  02/09/18 1200 Done    Acceptance E,D,TB VU,NR Education reinforced for R UE: NWB precautions, sling for comfort, HEP, positioning, axilla care and silverio-dressing strategies  02/07/18 1152 Done    JAN Bui NR   02/01/18 1142 Done    Acceptance E VU  KS 02/01/18 0934 Done    Acceptance JAN EASLEY NR UE ROM and LE ROM exer. to support ADL and  mobility indep  01/31/18 1414 Done    Acceptance KINDRA EASLEY D VU, DU role of OT, benfits of activity, WB'ing status, POC, transfers, bed mobility, safety  01/29/18 1502 Done               Point: Body mechanics (Done)    Description: Instruct learner(s) on proper positioning and spine alignment during self-care, functional mobility activities and/or exercises.    Learning Progress Summary    Learner Readiness Method Response Comment Documented by Status   Patient JAN Bui,NR   02/01/18 1142 Done    Acceptance E VU  KS 02/01/18 0934 Done    Acceptance KINDRA EASLEY D VU, DU role of OT, benfits of activity, WB'ing status, POC, transfers, bed mobility, safety  01/29/18 1502 Done                      User Key     Initials Effective Dates Name Provider Type Discipline     06/22/15 -  Doris Marsh, OT Occupational Therapist OT    SHARON 06/22/15 -  Myrtle Williamson, OT Occupational Therapist OT    AC 06/23/15 -  Julia Rodriguez, OT Occupational Therapist OT    UD 06/22/15 -  Loli León PTA Physical Therapy Assistant PT    ST 02/20/17 -  Cindi Marroquin, OTR Occupational Therapist OT    KS 09/18/16 -  Jeni Fletcher RN  Registered Nurse Nurse                  OT Recommendation and Plan  Anticipated Equipment Needs At Discharge:  (TBD based on placement)  Anticipated Discharge Disposition: skilled nursing facility  Planned Therapy Interventions: adaptive equipment training, ADL retraining, balance training, bed mobility training, home exercise program, ROM (Range of Motion), transfer training  Therapy Frequency: daily  Plan of Care Review  Plan Of Care Reviewed With: patient  Outcome Summary/Follow up Plan: Pt progress note completed and POC revised to reflect pt's current function. Pt presents with delayed responses and does better with increased time to allow responses. OT to follow per POC for R UE HEP and ADLs.  D/C plan is to [facility].        Outcome Measures       02/09/18 1125 02/08/18 1654 02/07/18 1300    How much help from another person do you currently need...    Turning from your back to your side while in flat bed without using bedrails?  3  -DM 2  -AS    Moving from lying on back to sitting on the side of a flat bed without bedrails?  2  -DM 2  -AS    Moving to and from a bed to a chair (including a wheelchair)?  3  -DM 3  -AS    Standing up from a chair using your arms (e.g., wheelchair, bedside chair)?  3  -DM 3  -AS    Climbing 3-5 steps with a railing?  2  -DM 2  -AS    To walk in hospital room?  3  -DM 3  -AS    AM-PAC 6 Clicks Score  16  -DM 15  -AS    How much help from another is currently needed...    Putting on and taking off regular lower body clothing? 2  -TB      Bathing (including washing, rinsing, and drying) 2  -TB      Toileting (which includes using toilet bed pan or urinal) 2  -TB      Putting on and taking off regular upper body clothing 2  -TB      Taking care of personal grooming (such as brushing teeth) 2  -TB      Eating meals 3  -TB      Score 13  -TB      Functional Assessment    Outcome Measure Options AM-PAC 6 Clicks Daily Activity (OT)  -TB AM-PAC 6 Clicks Basic Mobility (PT)  -DM  AM-PAC 6 Clicks Basic Mobility (PT)  -AS      02/07/18 1110 02/06/18 1330       How much help from another person do you currently need...    Turning from your back to your side while in flat bed without using bedrails?  2  -UD     Moving from lying on back to sitting on the side of a flat bed without bedrails?  2  -UD     Moving to and from a bed to a chair (including a wheelchair)?  3  -UD     Standing up from a chair using your arms (e.g., wheelchair, bedside chair)?  3  -UD     Climbing 3-5 steps with a railing?  2  -UD     To walk in hospital room?  3  -UD     AM-PAC 6 Clicks Score  15  -UD     How much help from another is currently needed...    Putting on and taking off regular lower body clothing? 2  -TB      Bathing (including washing, rinsing, and drying) 2  -TB      Toileting (which includes using toilet bed pan or urinal) 2  -TB      Putting on and taking off regular upper body clothing 2  -TB      Taking care of personal grooming (such as brushing teeth) 2  -TB      Eating meals 3  -TB      Score 13  -TB      Functional Assessment    Outcome Measure Options AM-PAC 6 Clicks Daily Activity (OT)  -TB        User Key  (r) = Recorded By, (t) = Taken By, (c) = Cosigned By    Initials Name Provider Type    TB Doris Marsh, OT Occupational Therapist    NATHALIA León, PTA Physical Therapy Assistant    NAJMA Dougherty, PT Physical Therapist    AS Katerine Laguna, PTA Physical Therapy Assistant           Time Calculation:         Time Calculation- OT       02/09/18 1206          Time Calculation- OT    OT Start Time 1125  -TB      Total Timed Code Minutes- OT 25 minute(s)  -TB      OT Received On 02/09/18  -TB      OT Goal Re-Cert Due Date 02/19/18  -TB        User Key  (r) = Recorded By, (t) = Taken By, (c) = Cosigned By    Initials Name Provider Type    TB Doris Marsh OT Occupational Therapist           Therapy Charges for Today     Code Description Service Date Service Provider  Modifiers Qty    [id number]  OT THERAPEUTIC ACT EA 15 MIN 2/9/2018 Doris Marsh OT GO 2          OT G-codes  OT Professional Judgement Used?: Yes  OT Functional Scales Options: AM-PAC 6 Clicks Daily Activity (OT)  Score: 8  Functional Limitation: Self care  Self Care Current Status (): At least 80 percent but less than 100 percent impaired, limited or restricted  Self Care Goal Status (): At least 60 percent but less than 80 percent impaired, limited or restricted    Doris Marsh OT  2/9/2018

## 2018-02-09 NOTE — PROGRESS NOTES
Continued Stay Note  [facility]     Patient Name: Liane Griffin  MRN: [id number]  Today's Date: 2/9/2018    Admit Date: 1/27/2018          Discharge Plan       02/09/18 1248    Case Management/Social Work Plan    Plan SNF    Patient/Family In Agreement With Plan --   Patient/Cousin    Final Note    Final Note [facility], they are planning on receiving insurance approval today.  Plan transfer to facility late this afternoon, cousin to transport.  Discussed with Dr. Shields, who is agreeable.  Leola Mims, Ext. [phone]              Discharge Codes       02/09/18 1248    Discharge Codes    Discharge Codes 03  Discharged/transferred to skilled nursing facility (SNF) with Medicare certification in anticipation of skilled care        Expected Discharge Date and Time     Expected Discharge Date Expected Discharge Time    Feb 9, 2018             GUDELIA Wiley

## 2018-02-09 NOTE — DISCHARGE SUMMARY
"    [facility] Medicine Services  DISCHARGE SUMMARY    Patient Name: Liane Griffin  : 1950  MRN: [id number]    Date of Admission: 2018  Date of Discharge:  18  Primary Care Physician: No Known Provider    Consults     Date and Time Order Name Status Description    2018 2137 Inpatient Consult to Orthopedic Surgery Completed         Hospital Course     Presenting Problem:   Immobility [Z74.09]    Active Hospital Problems (** Indicates Principal Problem)    Diagnosis Date Noted   • Recent fracture of right humeral head [S42.291A] 2018   • Immobility [Z74.09] 2018   • CAD (coronary artery disease) [I25.10] 2018   • Diabetes mellitus, type II [E11.9] 2018   • HTN (hypertension) [I10] 2018   • HLD (hyperlipidemia) [E78.5] 2018   • Schizophrenia [F20.9] 2018   • Anemia [D64.9] 2018   • Tremor [R25.1] 2018      Resolved Hospital Problems    Diagnosis Date Noted Date Resolved   No resolved problems to display.      Hospital Course:  Ms. Liane Griffin is a 68yo female with PMH significant for HTN, hyperlipidemia, non-insulin dependent DMII, CAD, chronic anemia, schizophrenia and memory loss. She presented to [facility] ED on 19 vis EMS for neck pain and immobility. She had recently fractured her R humeral head after a fall and was treated twice at [facility]. Plan was for immobilization and no surgical intervention. Plan was to discharge her from the ED back to her penitentiary community, [facility], where they provide meals, administer medications and assist with transfers/mobility, however, [facility] refused transfer back into facility, stating that they could not provide the higher level of care they felt Ms. Griffin needed. The patient reported she did not want to return because \"they are mean.\" Her only child is not local and is active in the [employer]. Her cousin visits regularly and assists her as needed. " "    Hospital medicine admitted the patient. She was treated with PRN pain control and PT/OT worked with her. Orthopedic surgery was consulted and Dr. Guidry agreed with non-operative management of the R humeral head fracture.     She was accepted to [facility] and will transfer in stable condition on 2/9/18.     Follow up February 13 @ 1:15pm with Do Crandall PA-C @ [facility].   Non-weight bearing to the RUE.   Sling on for comfort.   Pendulum exercises for range of motion as tolerated.     Day of Discharge     HPI:   Up in chair today. Arm is sore today, \"I think I overdid it yesterday.\" Otherwise, no new issues. Denies chest pain, dyspnea, N/V/D or constipation.     Review of Systems  Gen- No fevers, chills  CV- No chest pain, palpitations  Resp- No cough, dyspnea  GI- No N/V/D, abd pain    Otherwise ROS is negative except as mentioned in the HPI.    Vital Signs:   Temp:  [97.6 °F (36.4 °C)-98.3 °F (36.8 °C)] 97.6 °F (36.4 °C)  Heart Rate:  [62-81] 64  Resp:  [16-20] 18  BP: (108-149)/(55-98) 136/60     Physical Exam:  Constitutional: No acute distress, awake, alert and conversant.   HENT: NCAT, mucous membranes moist  Respiratory: Clear to auscultation bilaterally, respiratory effort normal   Cardiovascular: RRR, no murmurs, rubs, or gallops, palpable pedal pulses bilaterally  Gastrointestinal: Positive bowel sounds, soft, nontender, nondistended  Musculoskeletal: No bilateral ankle edema. R arm immobilized in sling.   Psychiatric: Appropriate affect, cooperative  Neurologic: Oriented x 3, strength symmetric in all extremities, Cranial Nerves grossly intact to confrontation, speech clear. Fine resting tremor.   Skin: No rashes    Pertinent  and/or Most Recent Results     Lab Results (all)     Procedure Component Value Units Date/Time    CBC Auto Differential [[id number]]  (Abnormal) Collected:  01/27/18 1255    Specimen:  Blood Updated:  01/27/18 1316     WBC 10.44 10*3/mm3      RBC 3.19 " (L) 10*6/mm3      Hemoglobin 9.4 (L) g/dL      Hematocrit 28.8 (L) %      MCV 90.3 fL      MCH 29.5 pg      MCHC 32.6 g/dL      RDW 14.7 (H) %      RDW-SD 48.6 fl      MPV 9.3 fL      Platelets 416 10*3/mm3      Neutrophil % 84.3 (H) %      Lymphocyte % 7.4 (L) %      Monocyte % 7.3 %      Eosinophil % 0.1 %      Basophil % 0.4 %      Immature Grans % 0.5 %      Neutrophils, Absolute 8.81 (H) 10*3/mm3      Lymphocytes, Absolute 0.77 10*3/mm3      Monocytes, Absolute 0.76 10*3/mm3      Eosinophils, Absolute 0.01 10*3/mm3      Basophils, Absolute 0.04 10*3/mm3      Immature Grans, Absolute 0.05 (H) 10*3/mm3     Lactic Acid, Plasma [[id number]]  (Normal) Collected:  01/27/18 1255    Specimen:  Blood Updated:  01/27/18 1323     Lactate 1.4 mmol/L       Falsely depressed results may occur on samples drawn from patients receiving N-Acetylcysteine (NAC) or Metamizole.       Comprehensive Metabolic Panel [[id number]]  (Abnormal) Collected:  01/27/18 1255    Specimen:  Blood Updated:  01/27/18 1328     Glucose 113 (H) mg/dL      BUN 21 mg/dL      Creatinine 0.80 mg/dL      Sodium 142 mmol/L      Potassium 3.7 mmol/L      Chloride 104 mmol/L      CO2 30.0 mmol/L      Calcium 9.5 mg/dL      Total Protein 7.2 g/dL      Albumin 4.30 g/dL      ALT (SGPT) 54 (H) U/L      AST (SGOT) 52 (H) U/L      Alkaline Phosphatase 63 U/L      Total Bilirubin 0.5 mg/dL      eGFR Non African Amer 71 mL/min/1.73      Globulin 2.9 gm/dL      A/G Ratio 1.5 g/dL      BUN/Creatinine Ratio 26.3 (H)     Anion Gap 8.0 mmol/L     Narrative:       National Kidney Foundation Guidelines    Stage     Description        GFR  1         Normal or High     90+  2         Mild decrease      60-89  3         Moderate decrease  30-59  4         Severe decrease    15-29  5         Kidney failure     <15    Influenza Antigen, Rapid - Swab, Nasopharynx [[id number]]  (Normal) Collected:  01/27/18 1305    Specimen:  Swab from Nasopharynx Updated:  01/27/18 1332     Influenza  A Ag, EIA Negative     Influenza B Ag, EIA Negative    Urinalysis With / Culture If Indicated - Urine, Catheter [[id number]]  (Abnormal) Collected:  01/27/18 1409    Specimen:  Urine from Urine, Catheter Updated:  01/27/18 1449     Color, UA Yellow     Appearance, UA Clear     pH, UA 6.0     Specific Gravity, UA 1.006     Glucose, UA Negative     Ketones, UA Negative     Bilirubin, UA Negative     Blood, UA Negative     Protein, UA Negative     Leuk Esterase, UA Trace (A)     Nitrite, UA Negative     Urobilinogen, UA 0.2 E.U./dL    Urinalysis, Microscopic Only - Urine, Clean Catch [[id number]]  (Abnormal) Collected:  01/27/18 1409    Specimen:  Urine from Urine, Catheter Updated:  01/27/18 1449     RBC, UA None Seen /HPF      WBC, UA 0-2 (A) /HPF      Bacteria, UA None Seen /HPF      Squamous Epithelial Cells, UA 0-2 /HPF      Hyaline Casts, UA None Seen /LPF      Methodology Automated Microscopy    Magnesium [[id number]]  (Normal) Collected:  01/27/18 1255    Specimen:  Blood Updated:  01/27/18 2029     Magnesium 1.3 mg/dL     POC Glucose Once [[id number]]  (Normal) Collected:  01/27/18 2208    Specimen:  Blood Updated:  01/27/18 2209     Glucose 116 mg/dL     Narrative:       Verify with Lab Meter: [id number] : [id number] Garcias    Urine Drug Screen - Urine, Clean Catch [[id number]]  (Abnormal) Collected:  01/28/18 0542    Specimen:  Urine from Urine, Clean Catch Updated:  01/28/18 0642     THC, Screen, Urine Negative     Phencyclidine (PCP), Urine Negative     Cocaine Screen, Urine Negative     Methamphetamine, Urine Negative     Opiate Screen Positive (A)     Amphetamine Screen, Urine Negative     Benzodiazepine Screen, Urine Negative     Tricyclic Antidepressants Screen Negative     Methadone Screen, Urine Negative     Barbiturates Screen, Urine Negative     Oxycodone Screen, Urine Negative     Propoxyphene Screen Negative     Buprenorphine, Screen, Urine Negative    Narrative:       Cutoff For Drugs  Screened:    Amphetamines               500 ng/ml  Barbiturates               200 ng/ml  Benzodiazepines            150 ng/ml  Cocaine                    150 ng/ml  Methadone                  200 ng/ml  Opiates                    100 ng/ml  Phencyclidine               25 ng/ml  THC                            50 ng/ml  Methamphetamine            500 ng/ml  Tricyclic Antidepressants  300 ng/ml  Oxycodone                  100 ng/ml  Propoxyphene               300 ng/ml  Buprenorphine               10 ng/ml    The normal value for all drugs tested is negative. This report includes unconfirmed screening results, with the cutoff values listed, to be used for medical treatment purposes only.  Unconfirmed results must not be used for non-medical purposes such as employment or legal testing.  Clinical consideration should be applied to any drug of abuse test, particularly when unconfirmed results are used.      CBC (No Diff) [[id number]]  (Abnormal) Collected:  01/28/18 0629    Specimen:  Blood Updated:  01/28/18 0656     WBC 7.47 10*3/mm3      RBC 3.05 (L) 10*6/mm3      Hemoglobin 8.7 (L) g/dL      Hematocrit 28.0 (L) %      MCV 91.8 fL      MCH 28.5 pg      MCHC 31.1 (L) g/dL      RDW 14.9 (H) %      RDW-SD 50.2 fl      MPV 9.6 fL      Platelets 382 10*3/mm3     Reticulocytes [[id number]]  (Abnormal) Collected:  01/28/18 0629    Specimen:  Blood Updated:  01/28/18 0656     Reticulocyte % 2.51 (H) %     POC Glucose Once [[id number]]  (Normal) Collected:  01/28/18 0713    Specimen:  Blood Updated:  01/28/18 0714     Glucose 117 mg/dL     Narrative:       Meter: [id number] : [id number] Mathis Anabella    Basic Metabolic Panel [[id number]]  (Abnormal) Collected:  01/28/18 0629    Specimen:  Blood Updated:  01/28/18 0808     Glucose 112 (H) mg/dL      BUN 17 mg/dL      Creatinine 0.70 mg/dL      Sodium 141 mmol/L      Potassium 3.7 mmol/L      Chloride 101 mmol/L      CO2 30.0 mmol/L      Calcium 8.8 mg/dL      eGFR Non African  Amer 83 mL/min/1.73      BUN/Creatinine Ratio 24.3     Anion Gap 10.0 mmol/L     Narrative:       National Kidney Foundation Guidelines    Stage     Description        GFR  1         Normal or High     90+  2         Mild decrease      60-89  3         Moderate decrease  30-59  4         Severe decrease    15-29  5         Kidney failure     <15    Iron Profile [[id number]]  (Abnormal) Collected:  01/28/18 0629    Specimen:  Blood Updated:  01/28/18 0808     Iron 29 (L) mcg/dL      TIBC 301 mcg/dL      Iron Saturation 10 (L) %     Ferritin [[id number]]  (Normal) Collected:  01/28/18 0629    Specimen:  Blood Updated:  01/28/18 0856     Ferritin 42.00 ng/mL     Folate [[id number]]  (Normal) Collected:  01/28/18 0629    Specimen:  Blood Updated:  01/28/18 0856     Folate 14.79 ng/mL     Narrative:         Folate Reference Ranges:    Deficient:            Less than 1.2 ng/mL  Indeterminant:        1.2-3.1 ng/mL  Normal:               3.2-20.0 ng/mL    Vitamin B12 [[id number]]  (Normal) Collected:  01/28/18 0629    Specimen:  Blood Updated:  01/28/18 0856     Vitamin B-12 291 pg/mL     Influenza A & B, RT PCR - Swab, Nasopharynx [[id number]]  (Normal) Collected:  01/28/18 0542    Specimen:  Swab from Nasopharynx Updated:  01/28/18 1101     Influenza A PCR Not Detected     Influenza B PCR Not Detected    Blood Culture - Blood, [[id number]]  (Normal) Collected:  01/27/18 1255    Specimen:  Blood from Arm, Left Updated:  02/01/18 1316     Blood Culture No growth at 5 days    Blood Culture - Blood, [[id number]]  (Normal) Collected:  01/27/18 1255    Specimen:  Blood from Arm, Right Updated:  02/01/18 1316     Blood Culture No growth at 5 days         Brief Urine Lab Results  (Last result in the past 365 days)      Color   Clarity   Blood   Leuk Est   Nitrite   Protein   CREAT   Urine HCG        01/27/18 1409 Yellow Clear Negative Trace(A) Negative Negative             Microbiology Results Abnormal     Procedure Component Value -  Date/Time    Blood Culture - Blood, [[id number]]  (Normal) Collected:  01/27/18 1255    Lab Status:  Final result Specimen:  Blood from Arm, Right Updated:  02/01/18 1316     Blood Culture No growth at 5 days    Blood Culture - Blood, [[id number]]  (Normal) Collected:  01/27/18 1255    Lab Status:  Final result Specimen:  Blood from Arm, Left Updated:  02/01/18 1316     Blood Culture No growth at 5 days    Influenza A & B, RT PCR - Swab, Nasopharynx [[id number]]  (Normal) Collected:  01/28/18 0542    Lab Status:  Final result Specimen:  Swab from Nasopharynx Updated:  01/28/18 1101     Influenza A PCR Not Detected     Influenza B PCR Not Detected    Influenza Antigen, Rapid - Swab, Nasopharynx [[id number]]  (Normal) Collected:  01/27/18 1305    Lab Status:  Final result Specimen:  Swab from Nasopharynx Updated:  01/27/18 1332     Influenza A Ag, EIA Negative     Influenza B Ag, EIA Negative        Imaging Results (all)     Procedure Component Value Units Date/Time    XR Chest 1 View [[id number]] Collected:  01/27/18 1347     Updated:  01/27/18 1449    Narrative:          EXAMINATION: XR CHEST 1 VW - 01/27/2018     INDICATION: Altered mental status, weakness.     COMPARISON: None.     FINDINGS:   1. The lungs are clear. The heart is upper limits of normal     2. There is no active disease.     3. There is a displaced comminuted fracture of the surgical neck of the  right humerus.           Impression:          There is no evidence of active disease in the chest. Edema,  consolidation or free fluid is not identified.     Heart size is normal.     The dominant finding is a displaced comminuted fracture of the neck of  the humerus, likely the surgical neck. This appears to be acute or early  subacute. Please correlate.     DICTATED:     01/27/2018  EDITED    :     01/27/2018      This report was finalized on 1/27/2018 2:46 PM by Dr. Alec Levine MD.       CT Head Without Contrast [[id number]] Collected:  01/27/18 1843      Updated:  01/27/18 1847    Narrative:       EXAMINATION: CT HEAD WO CONTRAST - 01/27/2018     INDICATION: Right-sided weakness, trauma.     TECHNIQUE: CT data set of the brain was performed without intravenous  contrast.     The radiation dose reduction device was turned on for each scan per the  ALARA (As Low as Reasonably Achievable) protocol.     COMPARISON: None.     FINDINGS:   1. The foramen magnum and basal cisterns are clear. There is convexity  atrophy.     2. Ocular lens are intact without dislocation. Conal contents are  normal.     3. There is no central atrophy. There is peripheral cortical atrophy at  the convexity. There is no evidence of extracerebral collection,  cerebral contusion, edema, mass or hemorrhage. Midline shift or mass  effect is not currently identified.     4. The facial structures are intact. Zygomas, zygomatic arches, mastoids  and paranasal sinuses are unremarkable and there is no skull fracture.       Impression:          Convexity atrophy.     Negative CT data set of the brain for cerebral contusion or hemorrhage.  There is no edema, mass, midline shift or skull fracture.     DICTATED:     01/27/2018  EDITED    :     01/27/2018      This report was finalized on 1/27/2018 6:45 PM by Dr. Alec Levine MD.       XR Humerus Right [[id number]] Collected:  01/28/18 1511     Updated:  01/28/18 1704    Narrative:       EXAMINATION: XR HUMERUS RIGHT - 01/28/2018     INDICATION: M43.6-Torticollis; S42.351K-Displaced comminuted fracture of  shaft of humerus, right arm, subsequent encounter for fracture with  nonunion; R53.81-Other malaise.      COMPARISON: None.     FINDINGS:   1. Acute appearing transverse fracture through the proximal right  humerus, likely the surgical neck. This is displaced and slightly offset  in alignment.      2. Otherwise, the right distal humerus and right elbow appear to be  intact. Mid shaft of the humerus is also preserved.           Impression:       Transverse  slightly comminuted mildly displaced fracture  proximal right humerus in the region of the surgical neck of the right  humerus. The alignment is slightly offset.     Mid shaft and distal portion right humerus intact.     Right elbow views negative for fracture or dislocation.     DICTATED:     01/28/2018  EDITED    :     01/28/2018      This report was finalized on 1/28/2018 5:02 PM by Dr. Alec Levine MD.       XR Shoulder 1 View Right [[id number]] Collected:  01/29/18 0838     Updated:  01/29/18 0927    Narrative:       EXAMINATION: XR SHOULDER 1 VW RIGHT- 01/29/2018     INDICATION: fracture; M43.6-Torticollis; S42.351K-Displaced comminuted  fracture of shaft of humerus, right arm, subsequent encounter for  fracture with nonunion; R53.81-Other malaise      COMPARISON: NONE     FINDINGS:   1. The 3 views of the right shoulder are more scapular views than  shoulder views. It appears that the scapula is grossly intact. The  distal right clavicle and AC joint are well aligned and the right rib  cage is nonrevealing.  2. There is a displaced fracture of the right humeral neck. It appears  that the humeral head is appropriately positioned in the glenoid. No  other diagnostic information is available from these transscapular  views.           Impression:       No obvious dislocation of the humeral head. Humeral neck  fracture noted. Scapula grossly intact as described above.        D:  01/29/2018  E:  01/29/2018     This report was finalized on 1/29/2018 9:25 AM by Dr. Alec Levine MD.           Discharge Details      Liane Griffin   Home Medication Instructions JUNIOR:[id number]    Printed on:02/09/18 1215   Medication Information                      acetaminophen (TYLENOL) 325 MG tablet  Take 650 mg by mouth Every 6 (Six) Hours As Needed for Mild Pain .             aluminum-magnesium hydroxide-simethicone (MAALOX/MYLANTA) 200-200-20 MG/5ML suspension  Take 10 mL by mouth Every 6 (Six) Hours As Needed for  Indigestion or Heartburn.             amLODIPine (NORVASC) 10 MG tablet  Take 10 mg by mouth Daily.             aspirin 81 MG EC tablet  Take 81 mg by mouth Daily.             atorvastatin (LIPITOR) 40 MG tablet  Take 40 mg by mouth Daily.             benztropine (COGENTIN) 0.5 MG tablet  Take 0.5 mg by mouth Every Evening.             bisacodyl (DULCOLAX) 10 MG suppository  Insert 1 suppository into the rectum Daily As Needed for Constipation.             bisacodyl (DULCOLAX) 5 MG EC tablet  Take 1 tablet by mouth Daily As Needed for Constipation.             busPIRone (BUSPAR) 5 MG tablet  Take 5 mg by mouth 2 (Two) Times a Day.             cholecalciferol (VITAMIN D3) 08977 units capsule  Take 50,000 Units by mouth 1 (One) Time Per Week. Last taken 01/24/18             clonazePAM (KlonoPIN) 0.5 MG tablet  Take 1 tablet by mouth 2 (Two) Times a Day As Needed for Anxiety.             clopidogrel (PLAVIX) 75 MG tablet  Take 75 mg by mouth Daily.             docusate sodium 100 MG capsule  Take 100 mg by mouth Daily.             ferrous sulfate 325 (65 FE) MG tablet  Take 1 tablet by mouth Daily With Breakfast.             FLUoxetine (PROzac) 10 MG capsule  Take 10 mg by mouth Daily.             fluticasone (FLONASE) 50 MCG/ACT nasal spray  2 sprays into each nostril Daily.             gabapentin (NEURONTIN) 400 MG capsule  Take 1 capsule by mouth 2 (Two) Times a Day.             HYDROcodone-acetaminophen (NORCO) 5-325 MG per tablet  Take 1 tablet by mouth Every 8 (Eight) Hours As Needed for Moderate Pain  or Severe Pain  for up to 10 days.             ibuprofen (ADVIL,MOTRIN) 600 MG tablet  Take 600 mg by mouth Every 6 (Six) Hours As Needed for Mild Pain .             loratadine (CLARITIN) 10 MG tablet  Take 10 mg by mouth Daily.             metFORMIN (GLUCOPHAGE) 500 MG tablet  Take 1 tablet by mouth 2 (Two) Times a Day With Meals.             OLANZapine (zyPREXA) 10 MG tablet  Take 10 mg by mouth Every Night.              omeprazole (priLOSEC) 20 MG capsule  Take 20 mg by mouth Daily.             perphenazine (TRILAFON) 4 MG tablet  Take 4 mg by mouth Daily. Daily at 3 pm             polyethylene glycol (MIRALAX) packet  Take 17 g by mouth Daily As Needed.             traMADol (ULTRAM) 50 MG tablet  Take 1 tablet by mouth 2 (Two) Times a Day As Needed for Moderate Pain .             traZODone (DESYREL) 50 MG tablet  Take 50 mg by mouth Every Night.             vitamin C (VITAMIN C) 500 MG tablet  Take 1 tablet by mouth Daily.               Discharge Disposition:  Skilled Nursing Facility ([place] - External)    Discharge Diet:  Diet Instructions     Diet: Regular, Consistent Carbohydrate, Cardiac; Thin       Discharge Diet:   Regular  Consistent Carbohydrate  Cardiac      Fluid Consistency:  Thin               Discharge Activity:   Activity Instructions     Discharge Activity Restrictions       Non-weight bearing to the RUE.   Sling on for comfort.   Pendulum exercises for range of motion as tolerated.               No future appointments.    Additional Instructions for the Follow-ups that You Need to Schedule     Discharge Follow-up with Specified Provider: Follow up February 13 @ 1:15pm with Do Crandall PA-C @ [facility].    As directed    To:  Follow up February 13 @ 1:15pm with Do Crandall PA-C @ [facility].                   Time Spent on Discharge:  45 minutes    Estefania Diamond PA-C  02/09/18  12:15 PM

## 2018-02-09 NOTE — PROGRESS NOTES
Continued Stay Note  [facility]     Patient Name: Liane Griffin  MRN: [id number]  Today's Date: 2/9/2018    Admit Date: 1/27/2018          Discharge Plan       02/09/18 0901    Case Management/Social Work Plan    Plan SNF    Patient/Family In Agreement With Plan --   Cousin    Additional Comments Continuing to await insurance approval for transfer to [facility] in [place].  Leola Mims, Ext. [phone]              Discharge Codes     None        Expected Discharge Date and Time     Expected Discharge Date Expected Discharge Time    Feb 6, 2018             GUDELIA Wiley

## 2018-02-12 NOTE — THERAPY DISCHARGE NOTE
Acute Care - Occupational Therapy Discharge Summary  [facility]     Patient Name: Liane Griffin  : 1950  MRN: [id number]    Today's Date: 2018  Onset of Illness/Injury or Date of Surgery Date: 18    Date of Referral to OT: 18  Referring Physician: JOE Mckeon      Admit Date: 2018        OT Recommendation and Plan    Visit Dx:    ICD-10-CM ICD-9-CM   1. Torticollis, acute M43.6 723.5   2. 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 Physical deconditioning R53.81 799.3   4. Impaired mobility and ADLs Z74.09 799.89   5. Impaired functional mobility, balance, gait, and endurance Z74.09 V49.89                     OT Goals       18 1202 18 1153 18 1027    Bed Mobility OT LTG    Bed Mobility OT LTG, Date Established 18  -TB      Bed Mobility OT LTG, Time to Achieve by discharge  -TB      Bed Mobility OT LTG, Activity Type supine to sit/sit to supine  -TB      Bed Mobility OT LTG, [place] Level minimum assist (75% patient effort);verbal cues required;nonverbal cues required (demo/gesture)  -TB      Bed Mobility OT LTG, Assist Device bed rails  -TB      Bed Mobility OT LTG, Outcome  goal ongoing   Mod A  -TB goal ongoing   Pt. already up in chair with PT.  -SHARON    Transfer Training OT LTG    Transfer Training OT LTG, Date Established 18  -TB      Transfer Training OT LTG, Time to Achieve by discharge  -TB      Transfer Training OT LTG, Activity Type sit to stand/stand to sit;toilet  -TB      Transfer Training OT LTG, [place] Level contact guard assist;verbal cues required  -TB      Transfer Training OT LTG, Additional Goal to BR, ETS   -TB      Transfer Training OT LTG, Outcome  goal ongoing  -TB goal ongoing   pt. just up with PT declined  -SHARON    Strength OT LTG    Strength Goal OT LTG, Outcome goal partially met   progressing; maintain goal  -TB goal partially met   met x10  reps  -TB goal partially met   met this session, cont to follow.  -SHARON    UB Dressing OT LTG    UB Dressing Goal OT LTG, Date Established 02/09/18  -TB      UB Dressing Goal OT LTG, Time to Achieve by discharge  -TB      UB Dressing Goal OT LTG, [place] Level moderate assist (50% patient effort);verbal cues required;nonverbal cues required (demo/gesture)  -TB      UB Dressing Goal OT LTG, Additional Goal with silverio-dressing  -TB      UB Dressing Goal OT LTG, Outcome  goal ongoing   Max A   -TB goal ongoing   Pt. wanting to stay position was in.  -SHARON      02/02/18 1349 01/31/18 1417 01/31/18 1415    Bed Mobility OT LTG    Bed Mobility OT LTG, Outcome goal ongoing  -AC  --   Pt. declined  -SHARON    Transfer Training OT LTG    Transfer Training OT LTG, Outcome goal ongoing  -AC  goal ongoing   per pt. just in bed,  declined  -SHARON    Strength OT LTG    Strength Goal OT LTG, Date Established  01/31/18  -SHARON     Strength Goal OT LTG, Time to Achieve  1 wk  -SHARON     Strength Goal OT LTG, Measure to Achieve  Pt. will participated AAROM/AROM BUE (R elbow to hand only) 10-15 reps each session to support ADL and transfer indep.  -SHARON     Strength Goal OT LTG, Outcome goal partially met   met today for 10reps  -AC goal ongoing  -SHARON     UB Dressing OT LTG    UB Dressing Goal OT LTG, Outcome goal ongoing  -AC  goal ongoing   pt. declined to sit up to address  -SHARON      User Key  (r) = Recorded By, (t) = Taken By, (c) = Cosigned By    Initials Name Provider Type    TB Doris Marsh, OT Occupational Therapist    SHARON Williamson, OT Occupational Therapist    AC Julia Rodriguez, OT Occupational Therapist                  OT Discharge Summary  Reason for Discharge: Discharge from facility  Outcomes Achieved: Refer to plan of care for updates on goals achieved  Discharge Destination: [facility]      Myrtle Williamson OT  2/12/2018

## 2020-07-08 ENCOUNTER — HOSPITAL ENCOUNTER (OUTPATIENT)
Dept: OTHER | Facility: HOSPITAL | Age: 70
Discharge: HOME | End: 2020-07-08

## 2020-07-09 LAB — SARS-COV-2 RNA SPEC QL NAA+PROBE: NOT DETECTED

## 2020-07-30 ENCOUNTER — HOSPITAL ENCOUNTER (OUTPATIENT)
Dept: OTHER | Facility: HOSPITAL | Age: 70
Discharge: HOME | End: 2020-07-30

## 2020-07-31 LAB — SARS-COV-2 RNA SPEC QL NAA+PROBE: NOT DETECTED

## 2023-08-15 NOTE — PLAN OF CARE
Problem: Patient Care Overview (Adult)  Goal: Plan of Care Review  Outcome: Ongoing (interventions implemented as appropriate)   02/01/18 1142   Coping/Psychosocial Response Interventions   Plan Of Care Reviewed With patient   Patient Care Overview   Progress improving   Outcome Evaluation   Outcome Summary/Follow up Plan pt up in chair.needs support for rt arm.ambulat 250 ft today with 2 assist.cues for longer strides       Problem: Inpatient Physical Therapy  Goal: Bed Mobility Goal LTG- PT  Outcome: Ongoing (interventions implemented as appropriate)   01/28/18 1159 01/29/18 0931 02/01/18 1142   Bed Mobility PT LTG   Bed Mobility PT LTG, Time to Achieve 4 days --  --    Bed Mobility PT LTG, Activity Type all bed mobility --  --    Bed Mobility PT LTG, [place] Level independent --  --    Bed Mobility PT LTG, Date Goal Reviewed --  01/29/18 --    Bed Mobility PT LTG, Outcome --  --  goal ongoing     Goal: Transfer Training Goal 1 LTG- PT  Outcome: Ongoing (interventions implemented as appropriate)   01/28/18 1159 01/29/18 0931 02/01/18 1142   Transfer Training PT LTG   Transfer Training PT LTG, Time to Achieve 4 days --  --    Transfer Training PT LTG, [place] Level independent --  --    Transfer Training PT LTG, Date Goal Reviewed --  01/29/18 --    Transfer Training PT LTG, Outcome --  --  goal ongoing     Goal: Gait Training Goal LTG- PT  Outcome: Ongoing (interventions implemented as appropriate)   01/28/18 1159 01/29/18 0931 02/01/18 1142   Gait Training PT LTG   Gait Training Goal PT LTG, Date Established 01/28/18 --  --    Gait Training Goal PT LTG, [place] Level conditional independence;supervision required --  --    Gait Training Goal PT LTG, Assist Device other (see comments) --  --    Gait Training Goal PT LTG, Distance to Achieve hha to 500 --  --    Gait Training Goal PT LTG, Date Goal Reviewed --  01/29/18 --    Gait Training Goal PT LTG, Outcome --  --  goal ongoing          Quality 431: Preventive Care And Screening: Unhealthy Alcohol Use - Screening: Patient not identified as an unhealthy alcohol user when screened for unhealthy alcohol use using a systematic screening method Quality 402: Tobacco Use And Help With Quitting Among Adolescents: Patient screened for tobacco and never smoked Detail Level: Detailed Quality 226: Preventive Care And Screening: Tobacco Use: Screening And Cessation Intervention: Patient screened for tobacco use and is an ex/non-smoker